# Patient Record
Sex: MALE | Race: WHITE | NOT HISPANIC OR LATINO | Employment: OTHER | URBAN - METROPOLITAN AREA
[De-identification: names, ages, dates, MRNs, and addresses within clinical notes are randomized per-mention and may not be internally consistent; named-entity substitution may affect disease eponyms.]

---

## 2017-02-28 ENCOUNTER — ANESTHESIA (OUTPATIENT)
Dept: GASTROENTEROLOGY | Facility: HOSPITAL | Age: 77
End: 2017-02-28

## 2017-02-28 ENCOUNTER — ANESTHESIA EVENT (OUTPATIENT)
Dept: GASTROENTEROLOGY | Facility: HOSPITAL | Age: 77
End: 2017-02-28

## 2017-02-28 PROCEDURE — 25010000002 PROPOFOL 10 MG/ML EMULSION: Performed by: ANESTHESIOLOGY

## 2017-02-28 RX ORDER — PROPOFOL 10 MG/ML
VIAL (ML) INTRAVENOUS AS NEEDED
Status: DISCONTINUED | OUTPATIENT
Start: 2017-02-28 | End: 2017-02-28 | Stop reason: SURG

## 2017-02-28 RX ORDER — LIDOCAINE HYDROCHLORIDE 20 MG/ML
INJECTION, SOLUTION INFILTRATION; PERINEURAL AS NEEDED
Status: DISCONTINUED | OUTPATIENT
Start: 2017-02-28 | End: 2017-02-28 | Stop reason: SURG

## 2017-02-28 RX ORDER — PROPOFOL 10 MG/ML
VIAL (ML) INTRAVENOUS CONTINUOUS PRN
Status: DISCONTINUED | OUTPATIENT
Start: 2017-02-28 | End: 2017-02-28 | Stop reason: SURG

## 2017-02-28 RX ADMIN — PROPOFOL 100 MG: 10 INJECTION, EMULSION INTRAVENOUS at 08:09

## 2017-02-28 RX ADMIN — LIDOCAINE HYDROCHLORIDE 60 MG: 20 INJECTION, SOLUTION INFILTRATION; PERINEURAL at 08:09

## 2017-02-28 RX ADMIN — PROPOFOL 100 MCG/KG/MIN: 10 INJECTION, EMULSION INTRAVENOUS at 08:09

## 2017-02-28 NOTE — ANESTHESIA POSTPROCEDURE EVALUATION
Patient: Keith Hankins Jr.    Procedure Summary     Date Anesthesia Start Anesthesia Stop Room / Location    02/28/17 0805 0828  [facility]       Procedure Diagnosis Surgeon Provider    COLONOSCOPY (N/A ) No diagnosis on file. MD Christie Gomez MD          Anesthesia Type: MAC  Last vitals  BP      Temp      Pulse     Resp      SpO2        Post Anesthesia Care and Evaluation    Patient location during evaluation: bedside  Patient participation: complete - patient participated  Level of consciousness: awake and alert  Pain management: adequate  Airway patency: patent  Anesthetic complications: No anesthetic complications    Cardiovascular status: acceptable  Respiratory status: acceptable  Hydration status: acceptable

## 2017-02-28 NOTE — ANESTHESIA PREPROCEDURE EVALUATION
Anesthesia Evaluation     Patient summary reviewed      Airway   Mallampati: II  no difficulty expected  Dental - normal exam     Pulmonary - negative pulmonary ROS and normal exam   Cardiovascular - normal exam    (+) hypertension, past MI ,       Neuro/Psych- negative ROS  GI/Hepatic/Renal/Endo - negative ROS     Musculoskeletal (-) negative ROS    Abdominal    Substance History - negative use     OB/GYN          Other - negative ROS                                   Anesthesia Plan    ASA 3     MAC     intravenous induction   Anesthetic plan and risks discussed with patient.

## 2017-07-18 ENCOUNTER — TELEPHONE (OUTPATIENT)
Dept: CARDIOLOGY | Facility: CLINIC | Age: 77
End: 2017-07-18

## 2017-07-18 NOTE — TELEPHONE ENCOUNTER
07/18/17  4:22 PM  Keith Hankins Jr.  1940    Home Phone [phone]     Mr. Hankins calls to report SOA with walking that has been occurring for the past 3 months. He works out regularly and states he is able to complete his work-out, but he does get more SOA than usual during the work-out. He also reports feeling more fatigued during the day than usual.     He denies chest pain. He cannot tell me the numbers, but states he BP and HR have been normal. He feels that he may need a stress test.     I don't see any open appts on your schedule until August 22nd. There is a hold spot that is open at the [facility] office next week, 7/27. When should he be seen?    Pati GOMEZ RN

## 2017-07-19 NOTE — TELEPHONE ENCOUNTER
Pt left msg returning your call & said he can come next Thursday to [facility] @ 3:40 pm.  I tried to put him in but it will not let me add someone in my own hold spots!  Can you please see if you can add him?    Thanks,  Loren

## 2017-07-27 ENCOUNTER — OFFICE VISIT (OUTPATIENT)
Dept: CARDIOLOGY | Facility: CLINIC | Age: 77
End: 2017-07-27

## 2017-07-27 VITALS
WEIGHT: 178 LBS | HEIGHT: 70 IN | BODY MASS INDEX: 25.48 KG/M2 | SYSTOLIC BLOOD PRESSURE: 120 MMHG | DIASTOLIC BLOOD PRESSURE: 58 MMHG | HEART RATE: 61 BPM

## 2017-07-27 DIAGNOSIS — I25.10 CORONARY ARTERY DISEASE INVOLVING NATIVE CORONARY ARTERY OF NATIVE HEART WITHOUT ANGINA PECTORIS: Primary | ICD-10-CM

## 2017-07-27 PROCEDURE — 99213 OFFICE O/P EST LOW 20 MIN: CPT | Performed by: INTERNAL MEDICINE

## 2017-07-27 PROCEDURE — 93000 ELECTROCARDIOGRAM COMPLETE: CPT | Performed by: INTERNAL MEDICINE

## 2017-07-30 NOTE — PROGRESS NOTES
Subjective:     Encounter Date:07/27/2017      Patient ID: Keith Hankins Jr. is a 77 y.o. male.    Chief Complaint:  Hypertension   This is a chronic problem. The problem is controlled. Associated symptoms include malaise/fatigue and shortness of breath. Pertinent negatives include no anxiety, chest pain, palpitations or peripheral edema.   Coronary Artery Disease   Presents for follow-up visit. Symptoms include shortness of breath. Pertinent negatives include no chest pain, chest pressure, chest tightness, dizziness or palpitations. His past medical history is significant for past myocardial infarction. The symptoms have been stable. Compliance with diet is good. Compliance with exercise is good. Compliance with medications is good.       77-year-old gentleman who I have not seen for a long time.  Patient presents today for reevaluation.  Patient had an episode where he felt a little short of breath.  He said he probably prematurely called to resolve the symptoms have subsequently resolved.  Clinically he is doing well no complaints    Review of Systems   Constitution: Positive for malaise/fatigue.   Cardiovascular: Negative for chest pain and palpitations.   Respiratory: Positive for shortness of breath. Negative for chest tightness.    Neurological: Negative for dizziness.   Psychiatric/Behavioral: Positive for depression.   All other systems reviewed and are negative.        ECG 12 Lead  Date/Time: 7/27/2017 11:13 AM  Performed by: VICTOR HUGO MELTON  Authorized by: VICTOR HUGO MELTON   Comparison: compared with previous ECG from 10/23/2015  Similar to previous ECG  Rhythm: sinus rhythm  Clinical impression: normal ECG               Objective:     Physical Exam   Constitutional: He is oriented to person, place, and time. He appears well-developed.   HENT:   Head: Normocephalic.   Eyes: Conjunctivae are normal.   Neck: Normal range of motion.   Cardiovascular: Normal rate, regular rhythm and normal heart  sounds.    Pulmonary/Chest: Breath sounds normal.   Abdominal: Soft. Bowel sounds are normal.   Musculoskeletal: Normal range of motion. He exhibits no edema.   Neurological: He is alert and oriented to person, place, and time.   Skin: Skin is warm and dry.   Psychiatric: He has a normal mood and affect. His behavior is normal.   Vitals reviewed.      Lab Review:       Assessment:         No diagnosis found.       Plan:       1.  History of old myocardial infarction.  Currently stable no issues.  2.  Hypertension and pressures great  3.  History of chronic kidney disease.  4.  At this point I recommend no further changes follow him clinically have him follow-up in one year sooner if issues    Coronary Artery Disease  Assessment  • The patient has no angina    Plan  • Lifestyle modifications discussed include adhering to a heart healthy diet, avoidance of tobacco products, maintenance of a healthy weight, medication compliance, regular exercise and regular monitoring of cholesterol and blood pressure    Subjective - Objective  • There is a history of past MI  • Current antiplatelet therapy includes aspirin 325 mg

## 2017-09-12 ENCOUNTER — OFFICE VISIT (OUTPATIENT)
Dept: CARDIOLOGY | Facility: CLINIC | Age: 77
End: 2017-09-12

## 2017-09-12 VITALS
OXYGEN SATURATION: 98 % | HEIGHT: 70 IN | HEART RATE: 57 BPM | SYSTOLIC BLOOD PRESSURE: 122 MMHG | DIASTOLIC BLOOD PRESSURE: 70 MMHG | BODY MASS INDEX: 26.2 KG/M2 | WEIGHT: 183 LBS

## 2017-09-12 DIAGNOSIS — I25.2 OLD MI (MYOCARDIAL INFARCTION): Primary | ICD-10-CM

## 2017-09-12 PROCEDURE — 99213 OFFICE O/P EST LOW 20 MIN: CPT | Performed by: INTERNAL MEDICINE

## 2017-09-12 NOTE — PROGRESS NOTES
Subjective:     Encounter Date:09/12/2017      Patient ID: Keith Hankins Jr. is a 77 y.o. male.    Chief Complaint:  Coronary Artery Disease   Presents for follow-up visit. Pertinent negatives include no chest pressure, chest tightness or dizziness. The symptoms have been stable. Compliance with diet is good. Compliance with exercise is good. Compliance with medications is good.   Hypertension   This is a chronic problem. The problem is controlled. Associated symptoms include malaise/fatigue. Pertinent negatives include no anxiety or peripheral edema.       77 old gentleman who presents today for reevaluation.  Since I had seen him last he was diagnosed with a deviated septum as well as sleep apnea.  He's been unable to tolerate his mask and therefore he is trying.  He is to undergo a nasal surgery in about a week.  Myocardial vascular standpoint he remained stable with no complaints no chest pain lightheadedness shortness of breath or palpitations.  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  He unfortunately is facing some life issues that has been very hard for him to address mentally.  With that he realizes he is depressed    Review of Systems   Constitution: Positive for malaise/fatigue.   Respiratory: Positive for sleep disturbances due to breathing and snoring. Negative for chest tightness.    Neurological: Negative for dizziness.   Psychiatric/Behavioral: Positive for depression. The patient is nervous/anxious.    All other systems reviewed and are negative.      Procedures       Objective:     Physical Exam   Constitutional: He is oriented to person, place, and time. He appears well-developed.   HENT:   Head: Normocephalic.   Eyes: Conjunctivae are normal.   Neck: Normal range of motion.   Cardiovascular: Normal rate, regular rhythm and normal heart sounds.   "  Pulmonary/Chest: Breath sounds normal.   Abdominal: Soft. Bowel sounds are normal.   Musculoskeletal: Normal range of motion. He exhibits no edema.   Neurological: He is alert and oriented to person, place, and time.   Skin: Skin is warm and dry.   Psychiatric: He has a normal mood and affect. His behavior is normal.   Vitals reviewed.      Lab Review:       Assessment:          Diagnosis Plan   1. Old MI (myocardial infarction)            Plan:       1.  History of prior marker infarction clinically stable  2.  Hypertension blood pressures great  3.  We had an extensive discussion about treatment with sleep apnea would help him significantly if we can get him to tolerate a mask.  I encouraged him to get his sinus surgery.  I also talked to them for a long time about life changes.  He is depressed and he is trying to work on it.  Unfortunately his career as winding down and as he says \"just not tolerating this well mentally\" he was not suicidal.  Patient was encouraged to best I could patient told to follow-up in one year sooner         "

## 2017-10-01 ENCOUNTER — HOSPITAL ENCOUNTER (EMERGENCY)
Facility: HOSPITAL | Age: 77
Discharge: HOME | End: 2017-10-02
Attending: EMERGENCY MEDICINE | Admitting: EMERGENCY MEDICINE

## 2017-10-01 ENCOUNTER — APPOINTMENT (OUTPATIENT)
Dept: GENERAL RADIOLOGY | Facility: HOSPITAL | Age: 77
End: 2017-10-01

## 2017-10-01 DIAGNOSIS — M25.572 ACUTE LEFT ANKLE PAIN: Primary | ICD-10-CM

## 2017-10-01 DIAGNOSIS — S82.52XA CLOSED AVULSION FRACTURE OF MEDIAL MALLEOLUS OF LEFT TIBIA, INITIAL ENCOUNTER: ICD-10-CM

## 2017-10-01 LAB
BASOPHILS # BLD AUTO: 0.02 10*3/MM3 (ref 0–0.2)
BASOPHILS NFR BLD AUTO: 0.2 % (ref 0–1.5)
DEPRECATED RDW RBC AUTO: 46.1 FL (ref 37–54)
EOSINOPHIL # BLD AUTO: 0.21 10*3/MM3 (ref 0–0.7)
EOSINOPHIL NFR BLD AUTO: 2.4 % (ref 0.3–6.2)
ERYTHROCYTE [DISTWIDTH] IN BLOOD BY AUTOMATED COUNT: 12.6 % (ref 11.5–14.5)
HCT VFR BLD AUTO: 45.6 % (ref 40.4–52.2)
HGB BLD-MCNC: 14.8 G/DL (ref 13.7–17.6)
IMM GRANULOCYTES # BLD: 0.02 10*3/MM3 (ref 0–0.03)
IMM GRANULOCYTES NFR BLD: 0.2 % (ref 0–0.5)
LYMPHOCYTES # BLD AUTO: 1.18 10*3/MM3 (ref 0.9–4.8)
LYMPHOCYTES NFR BLD AUTO: 13.3 % (ref 19.6–45.3)
MCH RBC QN AUTO: 32.5 PG (ref 27–32.7)
MCHC RBC AUTO-ENTMCNC: 32.5 G/DL (ref 32.6–36.4)
MCV RBC AUTO: 100.2 FL (ref 79.8–96.2)
MONOCYTES # BLD AUTO: 0.76 10*3/MM3 (ref 0.2–1.2)
MONOCYTES NFR BLD AUTO: 8.6 % (ref 5–12)
NEUTROPHILS # BLD AUTO: 6.68 10*3/MM3 (ref 1.9–8.1)
NEUTROPHILS NFR BLD AUTO: 75.3 % (ref 42.7–76)
PLATELET # BLD AUTO: 185 10*3/MM3 (ref 140–500)
PMV BLD AUTO: 10.7 FL (ref 6–12)
RBC # BLD AUTO: 4.55 10*6/MM3 (ref 4.6–6)
WBC NRBC COR # BLD: 8.87 10*3/MM3 (ref 4.5–10.7)

## 2017-10-01 PROCEDURE — 99283 EMERGENCY DEPT VISIT LOW MDM: CPT

## 2017-10-01 PROCEDURE — 73610 X-RAY EXAM OF ANKLE: CPT

## 2017-10-01 PROCEDURE — 80053 COMPREHEN METABOLIC PANEL: CPT | Performed by: PHYSICIAN ASSISTANT

## 2017-10-01 PROCEDURE — 84550 ASSAY OF BLOOD/URIC ACID: CPT | Performed by: PHYSICIAN ASSISTANT

## 2017-10-01 PROCEDURE — 85025 COMPLETE CBC W/AUTO DIFF WBC: CPT | Performed by: PHYSICIAN ASSISTANT

## 2017-10-02 ENCOUNTER — TELEPHONE (OUTPATIENT)
Dept: ORTHOPEDIC SURGERY | Facility: CLINIC | Age: 77
End: 2017-10-02

## 2017-10-02 VITALS
SYSTOLIC BLOOD PRESSURE: 160 MMHG | RESPIRATION RATE: 18 BRPM | BODY MASS INDEX: 26.66 KG/M2 | OXYGEN SATURATION: 100 % | HEART RATE: 67 BPM | TEMPERATURE: 97.8 F | DIASTOLIC BLOOD PRESSURE: 83 MMHG | WEIGHT: 180 LBS | HEIGHT: 69 IN

## 2017-10-02 LAB
ALBUMIN SERPL-MCNC: 4.2 G/DL (ref 3.5–5.2)
ALBUMIN/GLOB SERPL: 1.8 G/DL
ALP SERPL-CCNC: 67 U/L (ref 39–117)
ALT SERPL W P-5'-P-CCNC: 25 U/L (ref 1–41)
ANION GAP SERPL CALCULATED.3IONS-SCNC: 11.7 MMOL/L
AST SERPL-CCNC: 21 U/L (ref 1–40)
BILIRUB SERPL-MCNC: 0.4 MG/DL (ref 0.1–1.2)
BUN BLD-MCNC: 25 MG/DL (ref 8–23)
BUN/CREAT SERPL: 19.2 (ref 7–25)
CALCIUM SPEC-SCNC: 9.3 MG/DL (ref 8.6–10.5)
CHLORIDE SERPL-SCNC: 106 MMOL/L (ref 98–107)
CO2 SERPL-SCNC: 23.3 MMOL/L (ref 22–29)
CREAT BLD-MCNC: 1.3 MG/DL (ref 0.76–1.27)
GFR SERPL CREATININE-BSD FRML MDRD: 54 ML/MIN/1.73
GLOBULIN UR ELPH-MCNC: 2.3 GM/DL
GLUCOSE BLD-MCNC: 93 MG/DL (ref 65–99)
POTASSIUM BLD-SCNC: 4.8 MMOL/L (ref 3.5–5.2)
PROT SERPL-MCNC: 6.5 G/DL (ref 6–8.5)
SODIUM BLD-SCNC: 141 MMOL/L (ref 136–145)
URATE SERPL-MCNC: 7.7 MG/DL (ref 3.4–7)

## 2017-10-02 RX ORDER — ONDANSETRON 4 MG/1
4 TABLET, ORALLY DISINTEGRATING ORAL ONCE
Status: COMPLETED | OUTPATIENT
Start: 2017-10-02 | End: 2017-10-02

## 2017-10-02 RX ORDER — HYDROCODONE BITARTRATE AND ACETAMINOPHEN 5; 325 MG/1; MG/1
1 TABLET ORAL EVERY 6 HOURS PRN
Qty: 15 TABLET | Refills: 0 | Status: SHIPPED | OUTPATIENT
Start: 2017-10-02 | End: 2020-10-15

## 2017-10-02 RX ORDER — HYDROCODONE BITARTRATE AND ACETAMINOPHEN 7.5; 325 MG/1; MG/1
1 TABLET ORAL ONCE
Status: COMPLETED | OUTPATIENT
Start: 2017-10-02 | End: 2017-10-02

## 2017-10-02 RX ORDER — ONDANSETRON 4 MG/1
4 TABLET, ORALLY DISINTEGRATING ORAL EVERY 8 HOURS PRN
Qty: 15 TABLET | Refills: 0 | Status: SHIPPED | OUTPATIENT
Start: 2017-10-02 | End: 2020-06-05 | Stop reason: ALTCHOICE

## 2017-10-02 RX ADMIN — HYDROCODONE BITARTRATE AND ACETAMINOPHEN 1 TABLET: 7.5; 325 TABLET ORAL at 01:41

## 2017-10-02 RX ADMIN — ONDANSETRON 4 MG: 4 TABLET, ORALLY DISINTEGRATING ORAL at 01:41

## 2017-10-02 NOTE — ED PROVIDER NOTES
Pt presents to ED complaining of left lower leg/ankle swelling and pain onset last night. Pt experiences pain when ambulating. He cannot recall a specific injury, but he has been very active lately as he has been working on his boat. Upon exam, pt is alert and in NAD. There is tenderness just below medial left malleolus. There is moderate swelling. Dorsalis pedis pulse is intact. No erythema nor warmth.       XR left ankle: small avulsion fracture along the undersurface of the medial malleolus which may be chronic given lack of trauma history.     It is unclear if avulsion fracture is acute, but I suspect either that or an overuse tendonitis is responsible for pt's pain. We will review labs to rule out metabolic source.     I supervised care provided by the midlevel provider.    We have discussed this patient's history, physical exam, and treatment plan.   I have reviewed the note and personally saw and examined the patient and agree with the plan of care.    Documentation assistance provided by mary anne Braun for Dallin Leyva.  Information recorded by the scribe was done at my direction and has been verified and validated by me.       Jacinta Braun  10/01/17 1496       Dallin Leyva MD  10/02/17 8723

## 2017-10-02 NOTE — ED NOTES
"Pt to Room 6 with c/o left ankle pain that started around 0600 this morning.  Pt reports that he went to the gym on Friday and it was a \"little uncomfortable,\" Saturday he was working on his sailboat and it was \"a little better than Friday,\" and on Sunday he was awoken from his slumber in pain and unable to ambulate on it.  Pt reports that he is has recently stopped his blood thinner d/t having nose surgery next week.  PT does have 2+ pedal pulses, swelling noted to the medial aspect of the left ankle.  Pt is able to move his toes without discomfort.  No bruising noted.  Pt denies fever, chills, n/v/d, blurred vision, chest pain, abdominal pain, loss in control of bowel/bladder.  Pt is alert and oriented X4, PERRLA, respirations are even and unlabored, chest rise and fall is equal in expansion.  Pt does not appear to be in distress at this time.  Bed in low position, call light within reach, side rails up X2.      Daphney Jackman RN  10/01/17 2243    "

## 2017-10-04 ENCOUNTER — TELEPHONE (OUTPATIENT)
Dept: ORTHOPEDIC SURGERY | Facility: CLINIC | Age: 77
End: 2017-10-04

## 2017-10-09 ENCOUNTER — TELEPHONE (OUTPATIENT)
Dept: ORTHOPEDIC SURGERY | Facility: CLINIC | Age: 77
End: 2017-10-09

## 2017-10-09 NOTE — TELEPHONE ENCOUNTER
Has IOV with [facility] on 10/10 for ankle fracture from [facility] ER. Patient says he is doing so much better, does not feel he needs to come. The boot was rubbing, so he d/c'd and got a scooter. Has been totally NWB x 5 days. Also, on fixed income and does not want to come unless necessary. Please advise.

## 2017-10-10 ENCOUNTER — OFFICE VISIT (OUTPATIENT)
Dept: ORTHOPEDIC SURGERY | Facility: CLINIC | Age: 77
End: 2017-10-10

## 2017-10-10 VITALS — TEMPERATURE: 98.2 F | HEIGHT: 69 IN | WEIGHT: 180 LBS | BODY MASS INDEX: 26.66 KG/M2

## 2017-10-10 DIAGNOSIS — M25.572 LEFT ANKLE PAIN, UNSPECIFIED CHRONICITY: Primary | ICD-10-CM

## 2017-10-10 PROCEDURE — 99203 OFFICE O/P NEW LOW 30 MIN: CPT | Performed by: NURSE PRACTITIONER

## 2017-10-10 PROCEDURE — 73610 X-RAY EXAM OF ANKLE: CPT | Performed by: NURSE PRACTITIONER

## 2017-10-10 NOTE — PROGRESS NOTES
Patient: Keith Hankins Jr.  YOB: 1940 77 y.o. male  Medical Record Number: [id number]    Chief Complaints:   Chief Complaint   Patient presents with   • Left Ankle - Pain       History of Present Illness:Keith Hankins Jr. is a 77 y.o. male who presents For follow-up of questionable left ankle fracture.  Apparently the patient was doing a lot of work on his boat about 10 days ago and neck morning he woke up and had significant left ankle pain both lateral and medially.  He did not remember a specific injury.  He had something like this many years ago after wearing a pair of worn out tennis shoes.  Patient was placed in a boot in the emergency room and was referred here.  He came out of the boot last week has been walking around with no pain or problems.    Allergies: No Known Allergies    Medications:   Current Outpatient Prescriptions   Medication Sig Dispense Refill   • acetaminophen (TYLENOL) 650 MG 8 hr tablet Take 650 mg by mouth 2 (Two) Times a Day.     • amLODIPine (NORVASC) 10 MG tablet Take 10 mg by mouth Every Night.     • aspirin (ECOTRIN) 325 MG EC tablet Take 325 mg by mouth Daily.     • atorvastatin (LIPITOR) 40 MG tablet Take 40 mg by mouth Daily.     • buPROPion SR (WELLBUTRIN SR) 150 MG 12 hr tablet Take 1 tablet by mouth 2 (Two) Times a Day.     • furosemide (LASIX) 20 MG tablet Take 1 tablet by mouth Daily. As directed     • HYDROcodone-acetaminophen (NORCO) 5-325 MG per tablet Take 1 tablet by mouth Every 6 (Six) Hours As Needed for Severe Pain . 15 tablet 0   • irbesartan (AVAPRO) 150 MG tablet Take 300 mg by mouth Daily.     • metoprolol succinate XL (TOPROL-XL) 50 MG 24 hr tablet Take 1 tablet by mouth Daily.     • Multiple Vitamins-Minerals (CENTRUM SILVER ULTRA MENS PO) Take 1 tablet by mouth Daily.     • Nutritional Supplements (ANTI-INFLAMMATORY ENZYME PO) Take 4 tablets by mouth 2 (Two) Times a Day.     • OMEGA-3 KRILL OIL PO Take 1 capsule by mouth Daily.     • ondansetron  "ODT (ZOFRAN ODT) 4 MG disintegrating tablet Take 1 tablet by mouth Every 8 (Eight) Hours As Needed for Nausea (Take with pain medication if it makes you nauseated). 15 tablet 0   • tamsulosin (FLOMAX) 0.4 MG capsule 24 hr capsule Take 1 capsule by mouth Daily.     • Testosterone Cypionate (DEPO-TESTOSTERONE) 200 MG/ML injection Inject 200 mg into the shoulder, thigh, or buttocks 1 (One) Time Per Week.     • valsartan (DIOVAN) 320 MG tablet Take  by mouth.       No current facility-administered medications for this visit.          The following portions of the patient's history were reviewed and updated as appropriate: allergies, current medications, past family history, past medical history, past social history, past surgical history and problem list.    Review of Systems:   A 14 point review of systems was performed. All systems negative except pertinent positives/negative listed in HPI above    Physical Exam:   Vitals:    10/10/17 1202   Temp: 98.2 °F (36.8 °C)   Weight: 180 lb (81.6 kg)   Height: 69\" (175.3 cm)       General: A and O x 3, ASA, NAD    SCLERA:    Normal    DENTITION:   Normal  Skin clear no unusual lesions noted  Left ankle patient is nontender palpation he has full range of motion but does have a tight Achilles.  Negative drawer pedal pulses normal bilaterally    Radiology:  Xrays 2 views of the left ankle were ordered and reviewed today secondary to pain and show an old avulsion fracture of the lateral malleolus but otherwise no acute changes seen.  Dr. Morelos also reviewed x-rays and agrees.      Assessment/Plan:  Left ankle pain resolved    Patient was referred to outpatient physical therapy just for some generalized stretching and strengthening.  He will take Tylenol as needed since he is not able to take anti-inflammatories because of his kidneys and will let us know if the pain returns and we can always proceed with an MRI  "

## 2017-10-24 ENCOUNTER — HOSPITAL ENCOUNTER (OUTPATIENT)
Dept: PHYSICAL THERAPY | Facility: HOSPITAL | Age: 77
Setting detail: THERAPIES SERIES
Discharge: HOME | End: 2017-10-24

## 2017-10-24 DIAGNOSIS — M25.572 ACUTE LEFT ANKLE PAIN: Primary | ICD-10-CM

## 2017-10-24 PROCEDURE — G8978 MOBILITY CURRENT STATUS: HCPCS | Performed by: PHYSICAL THERAPIST

## 2017-10-24 PROCEDURE — 97161 PT EVAL LOW COMPLEX 20 MIN: CPT | Performed by: PHYSICAL THERAPIST

## 2017-10-24 PROCEDURE — G8979 MOBILITY GOAL STATUS: HCPCS | Performed by: PHYSICAL THERAPIST

## 2017-10-24 NOTE — THERAPY EVALUATION
Outpatient Physical Therapy Ortho Initial Evaluation  [facility]     Patient Name: Keith Hankins Jr.  : 1940  MRN: [id number]  Today's Date: 10/24/2017      Visit Date: 10/24/2017    There is no problem list on file for this patient.       Past Medical History:   Diagnosis Date   • Atherosclerotic heart disease of native coronary artery without angina pectoris    • Benign essential hypertension    • CKD (chronic kidney disease), stage III    • Degeneration of cervical intervertebral disc    • Depression with anxiety    • ED (erectile dysfunction)    • High cholesterol    • Hypertension    • MI (myocardial infarction)    • Spinal stenosis    • Syncope         Past Surgical History:   Procedure Laterality Date   • BACK SURGERY     • CATARACT EXTRACTION Bilateral    • COLONOSCOPY N/A 2017    Procedure: COLONOSCOPY TO CECUM;  Surgeon: Emmanuel Peterson MD;  Location: [facility] ENDOSCOPY;  Service:    • NECK SURGERY     • TONSILLECTOMY         Visit Dx:     ICD-10-CM ICD-9-CM   1. Acute left ankle pain M25.572 719.47             Patient History       10/24/17 1100          History    Chief Complaint Pain  -      Type of Pain Ankle pain  -KH      Brief Description of Current Complaint Keith is a 78 y/o male with history of L ankle pain after working on his boat. Patient woke up with significant ankle pain and swelling and went to the ER where they took X-Rays and put him in a boot. Patient's X-Rays showed an old avulsion fracture of the lateral malleolus but no acute changes. Now that he is a few weeks out, he no longer has any pain or swelling. He presents with full left ankle ROM and strength and no gait deficits. Patient likely pulled a muscle or irritated a tendon in his ankle which has now had time to heal. No need for skilled PT at this time but will leave POC open for 30 days in case patient has any exaccerbation of pain.   -      Onset Date- PT 10/24/17  -      Occupation/sports/leisure  activities walking dogs  -KH      Patient seeing anyone else for problem(s)? Yes  -KH      How has patient tried to help current problem? placed in boot temporarily  -KH      What clinical tests have you had for this problem? X-ray  -KH      Results of Clinical Tests old avulsion fracture of lateral malleolus  -KH      Pain     Pain Location Ankle  -KH      Pain at Present 1  -KH      Pain at Best 1  -KH      Pain at Worst 1  -KH      Pain Frequency Intermittent  -KH      Pain Description Aching  -KH      What Performance Factors Make the Current Problem(s) WORSE? WB/walking  -KH      What Performance Factors Make the Current Problem(s) BETTER? rest  -KH      Difficulties at work? not applicable  -KH      Difficulties with ADL's? no concerns  -KH      Difficulties with recreational activities? no concerns  -KH      Fall Risk Assessment    Any falls in the past year: No  -KH      Daily Activities    Primary Language English  -KH      Pt Participated in POC and Goals Yes  -KH      Safety    Are you being hurt, hit, or frightened by anyone at home or in your life? No  -KH      Are you being neglected by a caregiver No  -KH        User Key  (r) = Recorded By, (t) = Taken By, (c) = Cosigned By    Initials Name Provider Type    JACI Gallegos PT Physical Therapist                PT Ortho       10/24/17 1100    Subjective Comments    Subjective Comments My pain has pretty much resolved but I didn't think it would hurt to have my ankle looked at one more time  -KH    Subjective Pain    Able to rate subjective pain? yes  -KH    Pre-Treatment Pain Level 1  -KH    Post-Treatment Pain Level 1  -KH    Posture/Observations    Posture- WNL Posture is WNL  -KH    ROM (Range of Motion)    General ROM Detail B Ankle ROM WNL with inversion, eversion, plantar flexion, and dorsiflexion bilaterally  -KH    MMT (Manual Muscle Testing)    General MMT Assessment Detail B ankle strength 5/5 with MMT  -KH    Gait Assessment/Treatment     Gait, Comment no gait deficits noted  -JACI      User Key  (r) = Recorded By, (t) = Taken By, (c) = Cosigned By    Initials Name Provider Type    JACI Gallegos PT Physical Therapist                Therapy Education       10/24/17 1144          Therapy Education    Given Symptoms/condition management;Pain management  -      Program New  -JACI      How Provided Verbal  -KH      Provided to Patient  -JACI      Level of Understanding Teach back education performed  -JACI        User Key  (r) = Recorded By, (t) = Taken By, (c) = Cosigned By    Initials Name Provider Type    JACI Gallegos, PT Physical Therapist                PT OP Goals       10/24/17 1100       PT Short Term Goals    STG 1 Patient will continue to report minimal to no pain in Left ankle  -KH     STG 1 Progress New  -JACI       User Key  (r) = Recorded By, (t) = Taken By, (c) = Cosigned By    Initials Name Provider Type    JACI Gallegos PT Physical Therapist                PT Assessment/Plan       10/24/17 1143       PT Assessment    Assessment Comments Keith is a 78 y/o male with history of L ankle pain after working on his boat. Patient woke up with significant ankle pain and swelling and went to the ER where they took X-Rays and put him in a boot. Patient's X-Rays showed an old avulsion fracture of the lateral malleolus but no acute changes. Now that he is a few weeks out, he no longer has any pain or swelling. He presents with full left ankle ROM and strength and no gait deficits. Patient likely pulled a muscle or irritated a tendon in his ankle which has now had time to heal. No need for skilled PT at this time but will leave POC open for 30 days in case patient has any exaccerbation of pain.   -JACI     Please refer to paper survey for additional self-reported information Yes  -JACI     Rehab Potential Good  -KH     Patient/caregiver participated in establishment of treatment plan and goals Yes  -KH     PT Plan    PT Frequency One time visit  -JACI      Planned CPT's? PT EVAL LOW COMPLEXITY: 37759;PT THER PROC EA 15 MIN: 37415;PT MANUAL THERAPY EA 15 MIN: 01352;PT ULTRASOUND EA 15 MIN: 00098;PT RE-EVAL: 39567;PT SELF CARE/HOME MGMT/TRAIN EA 15: 19079;PT THER ACT EA 15 MIN: 80748;PT GAIT TRAINING EA 15 MIN: 40169  -     Physical Therapy Interventions (Optional Details) joint mobilization;patient/family education;postural re-education;ROM (Range of Motion);stair training;stretching;gross motor skills;home exercise program;transfer training;gait training;modalities  -     PT Plan Comments potential treatments if needed: US for pain relief, gait training, ankle strengthening/stretching  -       User Key  (r) = Recorded By, (t) = Taken By, (c) = Cosigned By    Initials Name Provider Type    JACI Gallegos PT Physical Therapist        Time Calculation:   Start Time: 1115  Stop Time: 1130  Time Calculation (min): 15 min     Therapy Charges for Today     Code Description Service Date Service Provider Modifiers Qty    64605573500 HC PT EVAL LOW COMPLEXITY 1 10/24/2017 Damaris Gallegos, PT GP 1    43978994338 HC PT MOBILITY CURRENT 10/24/2017 Damaris Gallegos, PT GP, CH 1    42325243021 HC PT MOBILITY PROJECTED 10/24/2017 Damaris Gallegos, PT GP, CH 1          PT G-Codes  PT Professional Judgement Used?: Yes  Outcome Measure Options: Other Outcome Measure (Strength/MMT)  Score: no disability- full ROM/strength  Functional Limitation: Mobility: Walking and moving around  Mobility: Walking and Moving Around Current Status (): 0 percent impaired, limited or restricted  Mobility: Walking and Moving Around Goal Status (): 0 percent impaired, limited or restricted         Damaris Gallegos PT  10/24/2017

## 2017-11-15 ENCOUNTER — OFFICE VISIT (OUTPATIENT)
Dept: ORTHOPEDIC SURGERY | Facility: CLINIC | Age: 77
End: 2017-11-15

## 2017-11-15 DIAGNOSIS — M25.511 RIGHT SHOULDER PAIN, UNSPECIFIED CHRONICITY: Primary | ICD-10-CM

## 2017-11-15 PROCEDURE — 99213 OFFICE O/P EST LOW 20 MIN: CPT | Performed by: ORTHOPAEDIC SURGERY

## 2017-11-15 PROCEDURE — 73030 X-RAY EXAM OF SHOULDER: CPT | Performed by: ORTHOPAEDIC SURGERY

## 2017-11-15 RX ORDER — TRAMADOL HYDROCHLORIDE 50 MG/1
TABLET ORAL
Qty: 30 TABLET | Refills: 0 | Status: SHIPPED | OUTPATIENT
Start: 2017-11-15 | End: 2020-06-05 | Stop reason: ALTCHOICE

## 2017-11-15 NOTE — PROGRESS NOTES
Chief Complaint   Patient presents with   • Right Shoulder - Pain             HPI the patient is here today for right shoulder painAnd limited abduction.  Duration of complaints is about 7 months.  Patient was working out aggressively at the [facility].  He felt a pop in the lateral aspect of his right shoulder.  Since that time he's had weakness in abduction.  He finds it difficult to sleep with his right shoulder in 8 recumbent position.  Fortunately, he is left-hand dominant and therefore his activities are not significantly affected by the shoulder injury.  He finds it difficult to to perform any cross body activities.  He's had a cervical spine fusion performed by Dr. Hayward at the C3 4 and the C4 5 level.  There is some overlap of pain and symptoms from the cervical spine radiating into the bilateral scapulae.  The shoulder pain however, is different and is constantly present.  Both the patient and I feel that this could be most likely a rotator cuff tear and we are going to order an MRI to evaluate the condition of the cuff and to make further recommendations.  The patient would like to pursue a surgical option if there is a tear of the cuff and a surgical repair is possible.          No Known Allergies      Social History     Social History   • Marital status:      Spouse name: N/A   • Number of children: N/A   • Years of education: N/A     Occupational History   • Not on file.     Social History Main Topics   • Smoking status: Former Smoker     Types: Cigarettes     Quit date: 1971   • Smokeless tobacco: Not on file      Comment: daily caffeine use   • Alcohol use Yes      Comment: occasional use   • Drug use: No   • Sexual activity: Defer     Other Topics Concern   • Not on file     Social History Narrative       Family History   Problem Relation Age of Onset   • Cancer Mother      breast   • Stroke Mother    • Heart disease Father        Past Surgical History:   Procedure  Laterality Date   • BACK SURGERY     • CATARACT EXTRACTION Bilateral    • COLONOSCOPY N/A 2/28/2017    Procedure: COLONOSCOPY TO CECUM;  Surgeon: Emmanuel Peterson MD;  [facility]: [facility] ENDOSCOPY;  Service:    • NECK SURGERY     • TONSILLECTOMY         Past Medical History:   Diagnosis Date   • Atherosclerotic heart disease of native coronary artery without angina pectoris    • Benign essential hypertension    • CKD (chronic kidney disease), stage III    • Degeneration of cervical intervertebral disc    • Depression with anxiety    • ED (erectile dysfunction)    • High cholesterol    • Hypertension    • MI (myocardial infarction)    • Spinal stenosis    • Syncope            There were no vitals filed for this visit.          Review of Systems   Constitutional: Negative.    HENT: Negative.    Eyes: Negative.    Respiratory: Negative.    Cardiovascular: Negative.    Gastrointestinal: Negative.    Endocrine: Negative.    Genitourinary: Negative.    Musculoskeletal: Positive for joint swelling.   Skin: Negative.    Allergic/Immunologic: Negative.    Neurological: Negative.    Hematological: Negative.    Psychiatric/Behavioral: Negative.            Physical Exam   Constitutional: He is oriented to person, place, and time. He appears well-nourished.   HENT:   Head: Atraumatic.   Eyes: EOM are normal.   Neck: Neck supple.   Cardiovascular: Normal rate and intact distal pulses.    Pulmonary/Chest: Effort normal.   Abdominal: Bowel sounds are normal.   Musculoskeletal: He exhibits edema and tenderness.   Neurological: He is alert and oriented to person, place, and time. He has normal reflexes.   Skin: Skin is dry.   Psychiatric: He has a normal mood and affect. His behavior is normal. Judgment and thought content normal.   Nursing note and vitals reviewed.              Joint/Body Part Specific Exam:  right shoulder. The shoulder is extremely tender anteriorly. There is tenderness over the insertion of the rotator cuff over  the greater tuberosity of the humerus. Slight proximal migration of the humeral head is noted. AC joint is tender. Crossover adduction test is positive. Drop arm sign is positive. Forward flexion is 0-90° degrees, abduction is 0-110° degrees, external rotation is 0-30 degrees. Patient has mild atrophy both of the supra and infraspinatus fossa. Sulcus sign is negative. There is no evidence of multidirectional instability. Neer test is positive on compression. Skin and soft tissue tenderness is noted. Patient’s strength in abduction and external rotation are extremely lacking. The pain level is 5.    Cervical Spine: Skin and soft tissues are mildly swollen. Deep tendon reflexes bilateral, symmetric and equal on both upper and lower extremities. Cough impulse is positive and invokes pain for the patient. No objective motor or sensory function deficit is present. No long tract signs. No myelopathy. No bowel or bladder deficit. Mild spasm of erector spinae muscle is present.Lateral rotations of the spine are limited in range of motion and painful for the patient. No evidence of myelopathy is noted.  bilaterally Sided rotation associated with pain and discomfort.      X-RAY Report:  right Shoulder X-Ray  Indication: Pain with limited range of motion following an injury  AP and Lateral  Findings: Sclerosis of the greater tuberosity of the shoulder  no bony lesion  Soft tissues within normal limits  decreased joint spaces  Hardware appropriately positioned not applicable      no prior studies available for comparison.    X-RAY was ordered and reviewed by Chet Raymundo MD              Diagnostics:            Keith was seen today for pain.    Diagnoses and all orders for this visit:    Right shoulder pain, unspecified chronicity  -     XR Shoulder 2+ View Right  -     MRI Shoulder Right Without Contrast; Future    Other orders  -     traMADol (ULTRAM) 50 MG tablet; 1 Oral QHS PRN severe pain            Procedures          I  provided this patient with educational materials regarding exercise and bone health.        Plan:   Stretching and strengthening exercises of the right shoulder to prevent a frozen shoulder.    Continue with [employer] back school exercise program for the cervical spine.    Tablet ibuprofen 600 mg orally twice a day for pain and discomfort.    Tablet Ultram 50 mg tab 1 by mouth daily at bedtime for breakthrough and nighttime pain and discomfort.    Schedule an MRI of the right shoulder for evaluation of rotator cuff pathology.  If the MRI shows a full-thickness rotator cuff tear he might be a candidate for surgical intervention.  He is the rotator cuff tear is partial thickness or there is involvement of the glenoid labrum in a degenerative sense than we could treat him with conservative, nonoperative management.    Follow-up in my office in 4 weeks to discuss the reports of the MRI and proceed with further recommendations for management.    Controlled substance treatment options discussed in detail. Patient's signed consent to medical options on file. KISHOR form in chart. Risks of narcotic medication usage outlined.  Possibility of physical and psychological dependence and abuse, especially long term, emphasized to the patient.  I have explained to the patient that we will try to wean them off the narcotic medication as soon as possible and introduce non-narcotic modalities of pain control.  I have also discussed the possibility of random drug testing as well as pill counts to prevent misuse and misappropriation of the narcotic medications prescribed to the patient.    CC To Clara Ann Pallares, MD

## 2017-11-29 ENCOUNTER — HOSPITAL ENCOUNTER (OUTPATIENT)
Dept: MRI IMAGING | Facility: HOSPITAL | Age: 77
Discharge: HOME | End: 2017-11-29
Attending: ORTHOPAEDIC SURGERY | Admitting: ORTHOPAEDIC SURGERY

## 2017-11-29 DIAGNOSIS — M25.511 RIGHT SHOULDER PAIN, UNSPECIFIED CHRONICITY: ICD-10-CM

## 2017-11-29 PROCEDURE — 73221 MRI JOINT UPR EXTREM W/O DYE: CPT

## 2017-12-06 ENCOUNTER — OFFICE VISIT (OUTPATIENT)
Dept: ORTHOPEDIC SURGERY | Facility: CLINIC | Age: 77
End: 2017-12-06

## 2017-12-06 DIAGNOSIS — M75.101 ROTATOR CUFF TEAR, NON-TRAUMATIC, RIGHT: Primary | ICD-10-CM

## 2017-12-06 PROCEDURE — 99214 OFFICE O/P EST MOD 30 MIN: CPT | Performed by: ORTHOPAEDIC SURGERY

## 2017-12-06 RX ORDER — PYRAZINAMIDE 500 MG/1
1 TABLET ORAL EVERY 12 HOURS PRN
Qty: 40 TABLET | Refills: 0 | Status: SHIPPED | OUTPATIENT
Start: 2017-12-06 | End: 2018-01-12 | Stop reason: SDUPTHER

## 2017-12-06 NOTE — PROGRESS NOTES
Chief Complaint   Patient presents with   • Right Shoulder - Follow-up           HPI the patient is here today for MRI results of his right shoulder. Patient continues to have pain and discomfort in the right shoulder.  He has some weakness in abduction.  He has some nighttime pain as well.  He likes to stay quite active and when he is working out in the gym his pain is definitely recurrent.  His clinical findings match his MRI findings.  He has a full-thickness tear of the supraspinatus which is appropriately symptomatic for him.  The patient states that he does not want a steroid injection at this point because the effects of that are mostly temporary and he is more interested in a long-term durable fix for his problem.  Had an extensive discussion about scheduling him for a rotator cuff repair along with a shoulder arthroscopy and he understands and accepts that.  All risks and benefits have been discussed in great detail.  Nighttime pain and discomfort on not controlled by ibuprofen and over-the-counter medication and therefore he is requesting something a little stronger to help manage his pain so that he can get some rest at night.        There were no vitals filed for this visit.        Review of Systems   Constitutional: Negative.    HENT: Negative.    Eyes: Negative.    Respiratory: Negative.    Cardiovascular: Negative.    Gastrointestinal: Negative.    Endocrine: Negative.    Genitourinary: Negative.    Musculoskeletal: Positive for myalgias.   Skin: Negative.    Allergic/Immunologic: Negative.    Neurological: Negative.    Hematological: Negative.    Psychiatric/Behavioral: Negative.            Physical Exam   Constitutional: He is oriented to person, place, and time. He appears well-developed and well-nourished.   HENT:   Head: Atraumatic.   Eyes: EOM are normal.   Neck: Neck supple.   Cardiovascular: Normal rate, regular rhythm, normal heart sounds and intact distal pulses.    Pulmonary/Chest: Effort  normal and breath sounds normal.   Abdominal: Soft. Bowel sounds are normal.   Musculoskeletal: He exhibits edema and tenderness.   Neurological: He is alert and oriented to person, place, and time. He has normal reflexes.   Skin: Skin is dry.   Psychiatric: He has a normal mood and affect. His behavior is normal. Judgment and thought content normal.   Nursing note and vitals reviewed.          Joint/Body Part Specific Exam:  right shoulder. The shoulder is extremely tender anteriorly. There is tenderness over the insertion of the rotator cuff over the greater tuberosity of the humerus. Slight proximal migration of the humeral head is noted. AC joint is tender. Crossover adduction test is positive. Drop arm sign is positive. Forward flexion is 0-120 degrees, abduction is 0-130 degrees, external rotation is 0-30 degrees. Patient has mild atrophy both of the supra and infraspinatus fossa. Sulcus sign is negative. There is no evidence of multidirectional instability. Neer test is positive on compression. Skin and soft tissue tenderness is noted. Patient’s strength in abduction and external rotation are extremely lacking. The pain level is 5.      X-RAY Report:        Diagnostics:  MRI report from the [facility] showed an extensive right supraspinatus tendinitis with a 10 mm full-thickness tear.  There is no retraction of the muscle belly.  No atrophy of the muscles noted.  There is fluid in the subacromial bursa.  There is mild arthritic change in the acromioclavicular joint.  The glenohumeral articulation is fairly well preserved.      Keith was seen today for follow-up.    Diagnoses and all orders for this visit:    Rotator cuff tear, non-traumatic, right    Other orders  -     acetaminophen-codeine (TYLENOL/CODEINE #3) 300-30 MG per tablet; Take 1 tablet by mouth Every 12 (Twelve) Hours As Needed for Moderate Pain .          Procedures        Plan:  Extensive discussion the patient about the findings of his MRI  on the right shoulder.  He does have a rotator cuff tear and his clinical findings match his MRI findings.    Retching and strengthening exercises of the shoulder to prevent a frozen shoulder.    Tablet ibuprofen 600 mg orally twice a day for pain and discomfort.    Use an overhead pulley system to minimize the possibility of developing a frozen shoulder.    The patient really does not want a steroid injection because he thinks that it will be a temporary fix for his issues and I agree that he is quite active at this point and would benefit from a rotator cuff repair surgically performed.    Dissected into the office today with the patient discussing the MRI findings and making surgical decision is 30 minutes.  Greater than 50% of this time was spent face-to-face with discussing the risks and benefits and potential complications of surgical intervention.    Given the patient a prescription of Tylenol with Codeine No. 3 tab 1 by mouth daily at bedtime when necessary pain and discomfort for nighttime breakthrough pain.    Patient has been diagnosed with a rotator cuff tear.  These tears can range from partial tears to complete tears of the muscle and/or tendon. They can also be categorized in size by width as being small, medium or large.  Diagnosis is confirmed with MRI or CT/arthrogram.  Management of these tears can be conservative with injections, physical therapy, activity modification and use of NSAIDS as needed.  Surgery is the only way to actually fix these tears, as they will not heal or repair on their own.  These tears can usually be repaired with arthroscopic surgery, but occasionally open procedures are necessary.  Many factors can influence the outcomes. First, larger tears have a higher chance of failure from a healing perspective, although pain may improve nonetheless, potentially longer recovery and sometimes, depending on the quality of the tissue and the length of time the tear has been present, may  not be repairable at all.  If the tear has been going on for a long time, the muscle can actually change to fatty tissue, and no longer act as muscle.  This can have a direct effect on not only the ability to repair the tear but also the outcome from the surgery itself both from a healing and functional perspective.  Therefore, the decision to proceed with surgery does have a time factor which can affect the quality of the tissue and reparability but also the potential for the tear to increase in size.  The longer you wait to repair the torn tendon there can be decreased potential for a successful outcome.    Risks of surgery have been discussed with the patient in detail.  These risks include but are not limited to possibility of infection, DVT, continued pain, continued progression of arthritis, use of allograft tissue, limited range of motion and strength and further surgical intervention.  Patient understands that the surgery will benefit mechanical symptoms of pain and instability but may not help with symptoms of arthritis.      Controlled substance treatment options discussed in detail. Patient's signed consent to medical options on file. KISHOR form in chart. Risks of narcotic medication usage outlined.  Possibility of physical and psychological dependence and abuse, especially long term, emphasized to the patient.  I have explained to the patient that we will try to wean them off the narcotic medication as soon as possible and introduce non-narcotic modalities of pain control.  I have also discussed the possibility of random drug testing as well as pill counts to prevent misuse and misappropriation of the narcotic medications prescribed to the patient.

## 2017-12-07 ENCOUNTER — DOCUMENTATION (OUTPATIENT)
Dept: PHYSICAL THERAPY | Facility: HOSPITAL | Age: 77
End: 2017-12-07

## 2017-12-07 DIAGNOSIS — M25.572 ACUTE LEFT ANKLE PAIN: Primary | ICD-10-CM

## 2017-12-07 NOTE — THERAPY DISCHARGE NOTE
Outpatient Physical Therapy Discharge Summary         Patient Name: Keith Hankins Jr.  : 1940  MRN: [id number]    Today's Date: 2017    Visit Dx:    ICD-10-CM ICD-9-CM   1. Acute left ankle pain M25.572 719.47             PT OP Goals       17 1100       PT Short Term Goals    STG 1 Patient will continue to report minimal to no pain in Left ankle  -KH     STG 1 Progress Met  -       User Key  (r) = Recorded By, (t) = Taken By, (c) = Cosigned By    Initials Name Provider Type    JACI Gallegos, PT Physical Therapist          OP PT Discharge Summary  Date of Discharge: 17  Reason for Discharge: All goals achieved  Outcomes Achieved: Able to achieve all goals within established timeline  Discharge Destination: Home without follow-up      Time Calculation:                    Damaris Gallegos PT  2017

## 2017-12-13 PROBLEM — M75.101 ROTATOR CUFF TEAR, NON-TRAUMATIC, RIGHT: Status: ACTIVE | Noted: 2017-12-13

## 2017-12-13 RX ORDER — CEFAZOLIN SODIUM 2 G/100ML
2 INJECTION, SOLUTION INTRAVENOUS ONCE
Status: CANCELLED | OUTPATIENT
Start: 2018-02-02 | End: 2018-02-02

## 2017-12-14 ENCOUNTER — TELEPHONE (OUTPATIENT)
Dept: ORTHOPEDIC SURGERY | Facility: CLINIC | Age: 77
End: 2017-12-14

## 2017-12-14 ENCOUNTER — PREP FOR SURGERY (OUTPATIENT)
Dept: OTHER | Facility: HOSPITAL | Age: 77
End: 2017-12-14

## 2017-12-14 DIAGNOSIS — M75.101 TEAR OF RIGHT ROTATOR CUFF, UNSPECIFIED TEAR EXTENT: Primary | ICD-10-CM

## 2017-12-14 RX ORDER — CEFAZOLIN SODIUM 2 G/100ML
2 INJECTION, SOLUTION INTRAVENOUS ONCE
Status: CANCELLED | OUTPATIENT
Start: 2018-02-13 | End: 2018-02-13

## 2017-12-14 RX ORDER — SODIUM CHLORIDE, SODIUM LACTATE, POTASSIUM CHLORIDE, CALCIUM CHLORIDE 600; 310; 30; 20 MG/100ML; MG/100ML; MG/100ML; MG/100ML
100 INJECTION, SOLUTION INTRAVENOUS CONTINUOUS
Status: CANCELLED | OUTPATIENT
Start: 2018-02-13

## 2017-12-14 RX ORDER — SODIUM CHLORIDE 0.9 % (FLUSH) 0.9 %
1-10 SYRINGE (ML) INJECTION AS NEEDED
Status: CANCELLED | OUTPATIENT
Start: 2018-02-13

## 2017-12-14 NOTE — TELEPHONE ENCOUNTER
Dr. Raymundo entered this as a [facility] case. I called the patient to discuss scheduling his surgery and he stated that he would much rather have this surgery at [facility] sometime in January. Thanks

## 2018-01-12 PROBLEM — M75.101 TEAR OF RIGHT ROTATOR CUFF: Status: ACTIVE | Noted: 2018-01-12

## 2018-01-15 RX ORDER — ACETAMINOPHEN AND CODEINE PHOSPHATE 300; 30 MG/1; MG/1
TABLET ORAL
Qty: 40 TABLET | Refills: 0 | Status: SHIPPED | OUTPATIENT
Start: 2018-01-15 | End: 2020-06-05 | Stop reason: ALTCHOICE

## 2018-01-31 ENCOUNTER — OFFICE VISIT (OUTPATIENT)
Dept: ORTHOPEDIC SURGERY | Facility: CLINIC | Age: 78
End: 2018-01-31

## 2018-01-31 DIAGNOSIS — M25.511 CHRONIC RIGHT SHOULDER PAIN: Primary | ICD-10-CM

## 2018-01-31 DIAGNOSIS — M75.101 ROTATOR CUFF TEAR, NON-TRAUMATIC, RIGHT: ICD-10-CM

## 2018-01-31 DIAGNOSIS — G89.29 CHRONIC RIGHT SHOULDER PAIN: Primary | ICD-10-CM

## 2018-01-31 PROCEDURE — 20610 DRAIN/INJ JOINT/BURSA W/O US: CPT | Performed by: ORTHOPAEDIC SURGERY

## 2018-01-31 PROCEDURE — 99213 OFFICE O/P EST LOW 20 MIN: CPT | Performed by: ORTHOPAEDIC SURGERY

## 2018-01-31 RX ADMIN — METHYLPREDNISOLONE ACETATE 160 MG: 80 INJECTION, SUSPENSION INTRA-ARTICULAR; INTRALESIONAL; INTRAMUSCULAR; SOFT TISSUE at 09:51

## 2018-01-31 RX ADMIN — LIDOCAINE HYDROCHLORIDE 2 ML: 10 INJECTION, SOLUTION EPIDURAL; INFILTRATION; INTRACAUDAL; PERINEURAL at 09:51

## 2018-02-05 ENCOUNTER — APPOINTMENT (OUTPATIENT)
Dept: PREADMISSION TESTING | Facility: HOSPITAL | Age: 78
End: 2018-02-05

## 2018-02-08 RX ORDER — METHYLPREDNISOLONE ACETATE 80 MG/ML
160 INJECTION, SUSPENSION INTRA-ARTICULAR; INTRALESIONAL; INTRAMUSCULAR; SOFT TISSUE
Status: COMPLETED | OUTPATIENT
Start: 2018-01-31 | End: 2018-01-31

## 2018-02-08 RX ORDER — LIDOCAINE HYDROCHLORIDE 10 MG/ML
2 INJECTION, SOLUTION EPIDURAL; INFILTRATION; INTRACAUDAL; PERINEURAL
Status: COMPLETED | OUTPATIENT
Start: 2018-01-31 | End: 2018-01-31

## 2018-09-12 ENCOUNTER — OFFICE VISIT (OUTPATIENT)
Dept: ORTHOPEDIC SURGERY | Facility: CLINIC | Age: 78
End: 2018-09-12

## 2018-09-12 VITALS — TEMPERATURE: 99 F | HEIGHT: 70 IN | WEIGHT: 180 LBS | BODY MASS INDEX: 25.77 KG/M2

## 2018-09-12 DIAGNOSIS — M25.511 RIGHT SHOULDER PAIN, UNSPECIFIED CHRONICITY: Primary | ICD-10-CM

## 2018-09-12 DIAGNOSIS — M75.101 ROTATOR CUFF TEAR, NON-TRAUMATIC, RIGHT: ICD-10-CM

## 2018-09-12 PROCEDURE — 20610 DRAIN/INJ JOINT/BURSA W/O US: CPT | Performed by: ORTHOPAEDIC SURGERY

## 2018-09-12 PROCEDURE — 99213 OFFICE O/P EST LOW 20 MIN: CPT | Performed by: ORTHOPAEDIC SURGERY

## 2018-09-12 RX ORDER — METHYLPREDNISOLONE ACETATE 80 MG/ML
160 INJECTION, SUSPENSION INTRA-ARTICULAR; INTRALESIONAL; INTRAMUSCULAR; SOFT TISSUE
Status: COMPLETED | OUTPATIENT
Start: 2018-09-12 | End: 2018-09-12

## 2018-09-12 RX ORDER — LIDOCAINE HYDROCHLORIDE 10 MG/ML
2 INJECTION, SOLUTION EPIDURAL; INFILTRATION; INTRACAUDAL; PERINEURAL
Status: COMPLETED | OUTPATIENT
Start: 2018-09-12 | End: 2018-09-12

## 2018-09-12 RX ADMIN — METHYLPREDNISOLONE ACETATE 160 MG: 80 INJECTION, SUSPENSION INTRA-ARTICULAR; INTRALESIONAL; INTRAMUSCULAR; SOFT TISSUE at 11:07

## 2018-09-12 RX ADMIN — LIDOCAINE HYDROCHLORIDE 2 ML: 10 INJECTION, SOLUTION EPIDURAL; INFILTRATION; INTRACAUDAL; PERINEURAL at 11:07

## 2018-09-12 NOTE — PROGRESS NOTES
Keith Hankins Jr.  ?  1940  ?  Chief Complaint   Patient presents with   • Right Shoulder - Follow-up, Pain     ?  HPI  Patient is here today for a follow-up on pain and discomfort in his right shoulder.  The patient states that his range of motion has improved to some degree.  He has tried a home based physical therapy program and that seems to have helped him tremendously in terms of decreasing his pain and discomfort.  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  He does have some nighttime pain and discomfort.  He states that he has to put the pressure off his body weight on his right shoulder to get some relief of symptoms.  He does not have a history of dislocation of the shoulder.  He is somewhat apprehensive to use his upper extremity and we have discussed with him about precautions to prevent a reinjury.  The patient does use CBD oil topically to act as an analgesic agent.  It helps him to decrease his pain as well as his nausea.        Review of Systems   Constitutional: Negative.    HENT: Negative.    Eyes: Negative.    Respiratory: Negative.    Cardiovascular: Negative.    Gastrointestinal: Negative.    Endocrine: Negative.    Genitourinary: Negative.    Musculoskeletal: Positive for arthralgias and myalgias.   Allergic/Immunologic: Negative.    Neurological: Negative.    Hematological: Negative.    Psychiatric/Behavioral: Negative.    All other systems reviewed and are negative.      Joint/Body Part Specific Exam:  right shoulder. The shoulder is extremely tender anteriorly. There is tenderness over the insertion of the rotator cuff over the greater tuberosity of the humerus. Slight proximal migration of the humeral head is noted. AC joint is tender. Crossover adduction test is positive. Drop arm sign is positive. Forward flexion is 0-130° degrees, abduction is 0-140° degrees, external rotation is 0-30° degrees. Patient  has mild atrophy both of the supra and infraspinatus fossa. Sulcus sign is negative. There is no evidence of multidirectional instability. Neer test is positive on compression. Skin and soft tissue tenderness is noted. Patient’s strength in abduction and external rotation are extremely lacking. The pain level is 3.  X Ray Report:    Diagnostics:    Large Joint Arthrocentesis  Date/Time: 9/12/2018 11:07 AM  Consent given by: patient  Site marked: site marked  Timeout: Immediately prior to procedure a time out was called to verify the correct patient, procedure, equipment, support staff and site/side marked as required   Supporting Documentation  Indications: pain   Procedure Details  Location: shoulder - R subacromial bursa  Preparation: Patient was prepped and draped in the usual sterile fashion  Needle size: 25 G  Approach: posterior  Medications administered: 160 mg methylPREDNISolone acetate 80 MG/ML; 2 mL lidocaine PF 1% 1 %  Patient tolerance: patient tolerated the procedure well with no immediate complications        ?  ?  Plan    · Ice pack for 48 hrs    · Injected patients right shoulder joint with steroid    · ace wrap for swelling PRN    · Elevations for swelling PRN    · Tablet Ibuprofen 600 mg P.O. BID x 5 Days with meals    · Call office for any problems  With procedure    · Home Physical Therapy Program discussed with patient    · F/U in 6 months for Re-evaluation.  ·   · Home-based exercise program with stretching and strengthening exercises of the rotator cuff muscles to minimize the possibility of arthrofibrosis and a frozen shoulder.  ·   · Discussed with the patient about the risks and benefits about surgical intervention both in the form of a rotator cuff repair as well as eventual reverse total shoulder arthroplasty.  The patient understands and will let me know if he would like to proceed with surgical intervention based on advancement of his symptoms.

## 2019-02-05 ENCOUNTER — OFFICE VISIT (OUTPATIENT)
Dept: ORTHOPEDIC SURGERY | Facility: CLINIC | Age: 79
End: 2019-02-05

## 2019-02-05 VITALS — BODY MASS INDEX: 26.66 KG/M2 | WEIGHT: 180 LBS | HEIGHT: 69 IN

## 2019-02-05 DIAGNOSIS — G89.29 CHRONIC RIGHT SHOULDER PAIN: Primary | ICD-10-CM

## 2019-02-05 DIAGNOSIS — M25.511 CHRONIC RIGHT SHOULDER PAIN: Primary | ICD-10-CM

## 2019-02-05 PROCEDURE — 20610 DRAIN/INJ JOINT/BURSA W/O US: CPT | Performed by: PHYSICIAN ASSISTANT

## 2019-02-05 PROCEDURE — 99213 OFFICE O/P EST LOW 20 MIN: CPT | Performed by: ORTHOPAEDIC SURGERY

## 2019-02-05 RX ADMIN — LIDOCAINE HYDROCHLORIDE 2 ML: 10 INJECTION, SOLUTION INFILTRATION; PERINEURAL at 14:20

## 2019-02-05 RX ADMIN — METHYLPREDNISOLONE ACETATE 160 MG: 80 INJECTION, SUSPENSION INTRA-ARTICULAR; INTRALESIONAL; INTRAMUSCULAR; SOFT TISSUE at 14:20

## 2019-02-05 NOTE — PROGRESS NOTES
Chief Complaint   Patient presents with   • Right Shoulder - Pain           HPI the patient is here today for a follow up of his right shoulder.         There were no vitals filed for this visit.        Review of Systems        Physical Exam        Joint/Body Part Specific Exam:        X-RAY Report:        Diagnostics:        There are no diagnoses linked to this encounter.        Large Joint Arthrocentesis: R subacromial bursa  Date/Time: 2/5/2019 2:51 PM  Consent given by: patient  Site marked: site marked  Timeout: Immediately prior to procedure a time out was called to verify the correct patient, procedure, equipment, support staff and site/side marked as required   Supporting Documentation  Indications: pain   Procedure Details  Location: shoulder - R subacromial bursa  Preparation: Patient was prepped and draped in the usual sterile fashion  Needle size: 25 G  Approach: anteromedial  Medications administered: 160 mg methylPREDNISolone acetate 80 MG/ML; 2 mL lidocaine PF 1% 1 %  Patient tolerance: patient tolerated the procedure well with no immediate complications              Plan:

## 2019-02-06 RX ORDER — LIDOCAINE HYDROCHLORIDE 10 MG/ML
2 INJECTION, SOLUTION INFILTRATION; PERINEURAL
Status: COMPLETED | OUTPATIENT
Start: 2019-02-05 | End: 2019-02-05

## 2019-02-06 RX ORDER — METHYLPREDNISOLONE ACETATE 80 MG/ML
160 INJECTION, SUSPENSION INTRA-ARTICULAR; INTRALESIONAL; INTRAMUSCULAR; SOFT TISSUE
Status: COMPLETED | OUTPATIENT
Start: 2019-02-05 | End: 2019-02-05

## 2019-02-06 NOTE — PROGRESS NOTES
FOLLOW UP VISIT    Keith Hankins Jr.:  ?  1940:  ?  Chief Complaint   Patient presents with   • Right Shoulder - Pain     ?  HPI:  He returns today with reports of pain in his right shoulder. He last received an injection of steroid about 5 months ago and it worked well. He states he is having difficulty and pain with putting a coat on, overhead motion/reaching and sleeping on the affected arm. He is very physically active and states he likes to do push ups but it is getting difficult because of his shoulder. He would like to discuss possible surgery for the right shoulder.     This patient is an established patient with an established problem.  This problem is not new to this examiner.    Review of Systems   Constitutional: Negative.    HENT: Negative.    Eyes: Negative.    Respiratory: Negative.    Cardiovascular: Negative.    Musculoskeletal: Positive for arthralgias.   Skin: Negative.    Allergic/Immunologic: Negative.    Neurological: Negative.    Hematological: Negative.    Psychiatric/Behavioral: Negative.          Physical Exam   Constitutional: Patient is oriented to person, place, and time. Appears well-developed and well-nourished.   HENT:   Head: Normocephalic and atraumatic.   Eyes: Conjunctivae and EOM are normal. Pupils are equal, round, and reactive to light.   Cardiovascular: Normal rate, regular rhythm, normal heart sounds and intact distal pulses.   Pulmonary/Chest: Effort normal and breath sounds normal.   Musculoskeletal:   See detailed exam below   Neurological: Alert and oriented to person, place, and time. No sensory deficit. Coordination normal.   Skin: Skin is warm and dry. Capillary refill takes less than 2 seconds. No rash noted. No erythema.   Psychiatric: Patient has a normal mood and affect. Her behavior is normal. Judgment and thought content normal.   Nursing note and vitals reviewed.    Ortho Exam:   right shoulder. The shoulder is extremely tender anteriorly. There is  tenderness over the insertion of the rotator cuff over the greater tuberosity of the humerus. Slight proximal migration of the humeral head is noted. AC joint is tender. Crossover adduction test is positive. Drop arm sign is positive. Forward flexion is 0-130 degrees, abduction is 0-140 degrees, external rotation is 0-30 degrees. Patient has mild atrophy both of the supra and infraspinatus fossa. Sulcus sign is negative. There is no evidence of multidirectional instability. Neer test is positive on compression. Skin and soft tissue tenderness is noted. Patient’s strength in abduction and external rotation are extremely lacking. The pain level is 5..      Diagnostics:       Assessment:  Keith was seen today for pain.    Diagnoses and all orders for this visit:    Chronic right shoulder pain  -     Cancel: Large Joint Arthrocentesis: R subacromial bursa  -     Large Joint Arthrocentesis: R glenohumeral          Large Joint Arthrocentesis: R glenohumeral  Date/Time: 2/5/2019 2:20 PM  Consent given by: patient  Site marked: site marked  Timeout: Immediately prior to procedure a time out was called to verify the correct patient, procedure, equipment, support staff and site/side marked as required   Supporting Documentation  Indications: pain   Procedure Details  Location: shoulder - R glenohumeral  Preparation: Patient was prepped and draped in the usual sterile fashion  Needle size: 25 G  Approach: posterior  Medications administered: 2 mL lidocaine 1 %; 160 mg methylPREDNISolone acetate 80 MG/ML  Patient tolerance: patient tolerated the procedure well with no immediate complications      Injection(s) above administered by Aden Mccollum PA-C.    ?  ?  Plan    · Compression/elastic brace if applicable  · Rest, ice, compression, and elevation (RICE) therapy  · OTC Alternate Ibuprofen and Tylenol as needed  · Restrictions: Advised not to do push ups   · Follow up in 3 month(s)  · Discussed with the patient about  the risks and benefits about surgical intervention both in the form of a rotator cuff repair as well as eventual reverse total shoulder arthroplasty. The patient understands and will let me know if he would like to proceed with surgical intervention based on advancement of his symptoms.  · Time spent: 25 minutes face to face. Greater than 50% spent in counseling and coordination in care

## 2019-05-22 ENCOUNTER — OFFICE VISIT (OUTPATIENT)
Dept: ORTHOPEDIC SURGERY | Facility: CLINIC | Age: 79
End: 2019-05-22

## 2019-05-22 VITALS — WEIGHT: 178 LBS | HEIGHT: 69 IN | BODY MASS INDEX: 26.36 KG/M2 | TEMPERATURE: 97.8 F

## 2019-05-22 DIAGNOSIS — M75.101 ROTATOR CUFF TEAR, NON-TRAUMATIC, RIGHT: ICD-10-CM

## 2019-05-22 DIAGNOSIS — M25.511 RIGHT SHOULDER PAIN, UNSPECIFIED CHRONICITY: Primary | ICD-10-CM

## 2019-05-22 PROCEDURE — 99213 OFFICE O/P EST LOW 20 MIN: CPT | Performed by: ORTHOPAEDIC SURGERY

## 2019-05-22 PROCEDURE — 20610 DRAIN/INJ JOINT/BURSA W/O US: CPT | Performed by: ORTHOPAEDIC SURGERY

## 2019-05-22 RX ORDER — ALLOPURINOL 100 MG/1
100 TABLET ORAL DAILY
Refills: 0 | COMMUNITY
Start: 2019-04-30

## 2019-05-22 RX ORDER — METHYLPREDNISOLONE ACETATE 80 MG/ML
160 INJECTION, SUSPENSION INTRA-ARTICULAR; INTRALESIONAL; INTRAMUSCULAR; SOFT TISSUE
Status: COMPLETED | OUTPATIENT
Start: 2019-05-22 | End: 2019-05-22

## 2019-05-22 RX ADMIN — METHYLPREDNISOLONE ACETATE 160 MG: 80 INJECTION, SUSPENSION INTRA-ARTICULAR; INTRALESIONAL; INTRAMUSCULAR; SOFT TISSUE at 10:15

## 2019-05-22 NOTE — PROGRESS NOTES
Keith Hankins Jr.  ?  1940  ?  Chief Complaint   Patient presents with   • Right Shoulder - Follow-up, Pain     ?  HPI  Patient is here for evaluation of pain and discomfort in his right shoulder.  He does have some weakness in abduction.  The patient states that the previous injection of steroid helped him for about 3 months.  He is very careful about limiting his abduction.  He is also careful about not lifting any heavy weights.  He states that he does have some nighttime pain and discomfort.  Overall he is pleased with his nonoperative management.  He has thought about surgery but would like to defer surgical intervention for as long as possible.  We have discussed about progressive cuff tear arthropathy and the fact that he may end up needing a reverse total shoulder arthroplasty based on the progression of his symptoms.  The patient states that he will let me know when he is ready for that form of intervention.        Review of Systems   Constitutional: Negative.    HENT: Negative.    Eyes: Negative.    Respiratory: Negative.    Cardiovascular: Negative.    Gastrointestinal: Negative.    Endocrine: Negative.    Genitourinary: Negative.    Musculoskeletal: Positive for arthralgias and myalgias.   Skin: Negative.    Allergic/Immunologic: Negative.    Neurological: Negative.    Hematological: Negative.    Psychiatric/Behavioral: Negative.        Joint/Body Part Specific Exam:right shoulder. The shoulder is extremely tender anteriorly. There is tenderness over the insertion of the rotator cuff over the greater tuberosity of the humerus. Slight proximal migration of the humeral head is noted. AC joint is tender. Crossover adduction test is positive. Drop arm sign is positive. Forward flexion is 0-120 degrees, abduction is 0-130 degrees, external rotation is 0-40 degrees. Patient has mild atrophy both of the supra and infraspinatus fossa. Sulcus sign is negative. There is no evidence of multidirectional  instability. Neer test is positive on compression. Skin and soft tissue tenderness is noted. Patient’s strength in abduction and external rotation are extremely lacking. The pain level is 5.    X Ray Report:    Diagnostics:    Large Joint Arthrocentesis: R glenohumeral  Date/Time: 5/22/2019 10:15 AM  Consent given by: patient  Site marked: site marked  Timeout: Immediately prior to procedure a time out was called to verify the correct patient, procedure, equipment, support staff and site/side marked as required   Supporting Documentation  Indications: pain   Procedure Details  Location: shoulder - R glenohumeral  Preparation: Patient was prepped and draped in the usual sterile fashion  Needle size: 25 G  Approach: posterior  Medications administered: 160 mg methylPREDNISolone acetate 80 MG/ML; 2 mL lidocaine (cardiac)  Patient tolerance: patient tolerated the procedure well with no immediate complications        ?  ?  Plan    · Ice pack for 48 hrs    · Injected patients right shoulder joint with steroid    · ace wrap for swelling PRN    · Elevations for swelling PRN    · Tablet Ibuprofen 600 mg P.O. BID x 5 Days with meals    · Call office for any problems  With procedure.  ·   · Continue with a home-based exercise program to prevent arthrofibrosis.  ·   · Discussed with the patient about possible surgical intervention in the form of reverse total shoulder arthroplasty.  The patient will let me know when he is ready for that form of intervention.    · Home Physical Therapy Program discussed with patient    · F/U in 3 months for Re-evaluation

## 2019-08-28 ENCOUNTER — CLINICAL SUPPORT (OUTPATIENT)
Dept: ORTHOPEDIC SURGERY | Facility: CLINIC | Age: 79
End: 2019-08-28

## 2019-08-28 VITALS — TEMPERATURE: 98.5 F | BODY MASS INDEX: 26.48 KG/M2 | WEIGHT: 185 LBS | HEIGHT: 70 IN

## 2019-08-28 DIAGNOSIS — M75.101 ROTATOR CUFF TEAR, NON-TRAUMATIC, RIGHT: Primary | ICD-10-CM

## 2019-08-28 PROCEDURE — 20610 DRAIN/INJ JOINT/BURSA W/O US: CPT | Performed by: ORTHOPAEDIC SURGERY

## 2019-08-28 RX ORDER — METHYLPREDNISOLONE ACETATE 80 MG/ML
160 INJECTION, SUSPENSION INTRA-ARTICULAR; INTRALESIONAL; INTRAMUSCULAR; SOFT TISSUE
Status: COMPLETED | OUTPATIENT
Start: 2019-08-28 | End: 2019-08-28

## 2019-08-28 RX ADMIN — METHYLPREDNISOLONE ACETATE 160 MG: 80 INJECTION, SUSPENSION INTRA-ARTICULAR; INTRALESIONAL; INTRAMUSCULAR; SOFT TISSUE at 14:16

## 2019-08-28 NOTE — PROGRESS NOTES
INJECTION    Patient: Keith Hankins Jr.    YOB: 1940    MRN: [id number]    Chief Complaint   Patient presents with   • Right Shoulder - Follow-up       History of Present Illness: Patient is seen in the office today with complaint of right shoulder pain. The pain is daily, generalized in the shoulder(s), and is acute on chronic in nature, and is worsened by overhead motion and cross body activities. It has been progressive in nature but remains intermittent . Has had improvement in the past with heat, Tylenol and injections.    This problem is not new to this examiner.     Allergies: No Known Allergies    Medications:   Home Medications:  Current Outpatient Medications on File Prior to Visit   Medication Sig   • acetaminophen (TYLENOL) 650 MG 8 hr tablet Take 650 mg by mouth 2 (Two) Times a Day.   • acetaminophen-codeine (TYLENOL #3) 300-30 MG per tablet TAKE 1 TAB BY MOUTH EVERY 12 HOURS AS NEEDED FOR MODERATE PAIN   • acetaminophen-codeine (TYLENOL #3) 300-30 MG per tablet 1-2 pills PO Q6 hours as needed for pain   • allopurinol (ZYLOPRIM) 100 MG tablet Take 100 mg by mouth Daily.   • amLODIPine (NORVASC) 10 MG tablet Take 10 mg by mouth Every Night.   • aspirin (ECOTRIN) 325 MG EC tablet Take 325 mg by mouth Daily.   • atorvastatin (LIPITOR) 40 MG tablet Take 40 mg by mouth Daily.   • buPROPion SR (WELLBUTRIN SR) 150 MG 12 hr tablet Take 1 tablet by mouth 2 (Two) Times a Day.   • furosemide (LASIX) 20 MG tablet Take 1 tablet by mouth Daily. As directed   • HYDROcodone-acetaminophen (NORCO) 5-325 MG per tablet Take 1 tablet by mouth Every 6 (Six) Hours As Needed for Severe Pain .   • irbesartan (AVAPRO) 150 MG tablet Take 300 mg by mouth Daily.   • metoprolol succinate XL (TOPROL-XL) 50 MG 24 hr tablet Take 1 tablet by mouth Daily.   • Multiple Vitamins-Minerals (CENTRUM SILVER ULTRA MENS PO) Take 1 tablet by mouth Daily.   • Nutritional Supplements (ANTI-INFLAMMATORY ENZYME PO) Take 4 tablets by  mouth 2 (Two) Times a Day.   • OMEGA-3 KRILL OIL PO Take 1 capsule by mouth Daily.   • ondansetron ODT (ZOFRAN ODT) 4 MG disintegrating tablet Take 1 tablet by mouth Every 8 (Eight) Hours As Needed for Nausea (Take with pain medication if it makes you nauseated).   • predniSONE (DELTASONE) 20 MG tablet 3 PO qd x 3 days, then 2 PO qd x 3 days, then 1 PO qd x 3 days.   • tamsulosin (FLOMAX) 0.4 MG capsule 24 hr capsule Take 1 capsule by mouth Daily.   • Testosterone Cypionate (DEPO-TESTOSTERONE) 200 MG/ML injection Inject 200 mg into the shoulder, thigh, or buttocks 1 (One) Time Per Week.   • traMADol (ULTRAM) 50 MG tablet 1 Oral QHS PRN severe pain   • valsartan (DIOVAN) 320 MG tablet Take  by mouth.     No current facility-administered medications on file prior to visit.      Current Medications:  Scheduled Meds:  PRN Meds:.    I have reviewed the patient's medical history in detail and updated the computerized patient record.  Review and summarization of old records include:    Past Medical History:   Diagnosis Date   • Atherosclerotic heart disease of native coronary artery without angina pectoris    • Benign essential hypertension    • CKD (chronic kidney disease), stage III (CMS/HCC)    • Degeneration of cervical intervertebral disc    • Depression with anxiety    • ED (erectile dysfunction)    • High cholesterol    • Hypertension    • MI (myocardial infarction) (CMS/[facility])    • Spinal stenosis    • Syncope      Past Surgical History:   Procedure Laterality Date   • BACK SURGERY     • CATARACT EXTRACTION Bilateral    • COLONOSCOPY N/A 2017    Procedure: COLONOSCOPY TO CECUM;  Surgeon: Emmanuel Peterson MD;  Location: [facility] ENDOSCOPY;  Service:    • NECK SURGERY     • TONSILLECTOMY       Social History     Occupational History   • Not on file   Tobacco Use   • Smoking status: Former Smoker     Types: Cigarettes     Last attempt to quit: 1971     Years since quittin.6   • Smokeless tobacco: Never Used   •  "Tobacco comment: daily caffeine use   Substance and Sexual Activity   • Alcohol use: Yes     Comment: occasional use   • Drug use: No   • Sexual activity: Defer      Social History     Social History Narrative   • Not on file     Family History   Problem Relation Age of Onset   • Cancer Mother         breast   • Stroke Mother    • Heart disease Father        Review of Systems        Wt Readings from Last 3 Encounters:   08/28/19 83.9 kg (185 lb)   05/22/19 80.7 kg (178 lb)   04/16/19 81.6 kg (180 lb)     Ht Readings from Last 3 Encounters:   08/28/19 177.8 cm (70\")   05/22/19 175.3 cm (69\")   04/16/19 177.8 cm (70\")     Body mass index is 26.54 kg/m².  Facility age limit for growth percentiles is 20 years.  Vitals:    08/28/19 1414   Temp: 98.5 °F (36.9 °C)       Physical Exam    Musculoskeletal:    Right shoulder (rct). The shoulder is extremely tender anteriorly. There is tenderness over the insertion of the rotator cuff over the greater tuberosity of the humerus. Slight proximal migration of the humeral head is noted. AC joint is tender. Crossover adduction test is positive. Drop arm sign is positive. Forward flexion is 0 to 140 degrees, abduction is 0-140 degrees, external rotation is 0-30 degrees. Patient has mild atrophy both of the supra and infraspinatus fossa. Sulcus sign is negative. There is no evidence of multidirectional instability. Neer test is positive on compression. Skin and soft tissue tenderness is noted. Patient's strength in abduction and external rotation are extremely lacking. The pain level is 5.      Diagnostics:      Procedure:  Large Joint Arthrocentesis: R glenohumeral  Date/Time: 8/28/2019 2:16 PM  Consent given by: patient  Site marked: site marked  Timeout: Immediately prior to procedure a time out was called to verify the correct patient, procedure, equipment, support staff and site/side marked as required   Supporting Documentation  Indications: pain   Procedure Details  Location: " shoulder - R glenohumeral  Preparation: Patient was prepped and draped in the usual sterile fashion  Needle size: 25 G  Approach: anteromedial  Medications administered: 2 mL lidocaine (cardiac); 160 mg methylPREDNISolone acetate 80 MG/ML  Patient tolerance: patient tolerated the procedure well with no immediate complications          Assessment:   Keith was seen today for follow-up.    Diagnoses and all orders for this visit:    Rotator cuff tear, non-traumatic, right    Other orders  -     Large Joint Arthrocentesis: R glenohumeral          Plan:   · Injected patient's right shoulder joint(s)with Steroid from an anterolateral approach   · Stretching and strengthening exercises of the right shoulder to prevent arthrofibrosis and a frozen shoulder.  · As his cuff tear disease progresses, he might eventually need reverse total shoulder arthroplasty.  I have discussed that with the patient.  At this point however, he is doing well with nonoperative management and plans are to continue with current care of his shoulder joint nonoperatively.  · Rest, ice, compression, and elevation (RICE) therapy  · OTC Tylenol 500-1000mg by mouth every 6 hours as needed for pain   · Follow up in 3 month(s)    Date of encounter: 08/28/2019   Chet Raymundo MD

## 2019-11-27 ENCOUNTER — CLINICAL SUPPORT (OUTPATIENT)
Dept: ORTHOPEDIC SURGERY | Facility: CLINIC | Age: 79
End: 2019-11-27

## 2019-11-27 VITALS — BODY MASS INDEX: 25.77 KG/M2 | WEIGHT: 180 LBS | TEMPERATURE: 99.1 F | HEIGHT: 70 IN

## 2019-11-27 DIAGNOSIS — M25.511 RIGHT SHOULDER PAIN, UNSPECIFIED CHRONICITY: Primary | ICD-10-CM

## 2019-11-27 DIAGNOSIS — M75.121 NONTRAUMATIC COMPLETE TEAR OF RIGHT ROTATOR CUFF: ICD-10-CM

## 2019-11-27 PROCEDURE — 73030 X-RAY EXAM OF SHOULDER: CPT | Performed by: ORTHOPAEDIC SURGERY

## 2019-11-27 PROCEDURE — 20610 DRAIN/INJ JOINT/BURSA W/O US: CPT | Performed by: ORTHOPAEDIC SURGERY

## 2019-11-27 RX ORDER — ESCITALOPRAM OXALATE 10 MG/1
10 TABLET ORAL DAILY
Refills: 1 | COMMUNITY
Start: 2019-10-27

## 2019-11-27 RX ORDER — BUPROPION HYDROCHLORIDE 150 MG/1
150 TABLET ORAL EVERY MORNING
Refills: 3 | COMMUNITY
Start: 2019-10-15

## 2019-11-27 RX ADMIN — METHYLPREDNISOLONE ACETATE 80 MG: 80 INJECTION, SUSPENSION INTRA-ARTICULAR; INTRALESIONAL; INTRAMUSCULAR; SOFT TISSUE at 10:02

## 2019-11-27 NOTE — PROGRESS NOTES
INJECTION    Patient: Keith Hankins Jr.    YOB: 1940    MRN: [id number]    Chief Complaint   Patient presents with   • Right Shoulder - Injections, Pain       History of Present Illness: Patient is seen in the office today with complaint of right shoulder pain. The pain is daily, generalized in the shoulder(s), and is acute on chronic in nature, and is worsened by overhead motion and cross body activities. It has been progressive in nature but remains constant. Has had improvement in the past with heat, NSAIDS and injections.  He states that he has had a prior cervical spine fusion involving the C4 to the C6 vertebral bodies and he does get significant radiculopathy from that lesion in the cervical spine.  It is quite possible that he has extended the degenerative changes in the cervical spine to the next level above and below the fusion levels which needs to be looked at by his original neurosurgeon Dr. Hayward.  The patient states that he will make an appointment to see his neurosurgeon for evaluation of the cervical spine symptoms.    This problem is not new to this examiner.     Allergies: No Known Allergies    Medications:   Home Medications:  Current Outpatient Medications on File Prior to Visit   Medication Sig   • acetaminophen (TYLENOL) 650 MG 8 hr tablet Take 650 mg by mouth 2 (Two) Times a Day.   • acetaminophen-codeine (TYLENOL #3) 300-30 MG per tablet TAKE 1 TAB BY MOUTH EVERY 12 HOURS AS NEEDED FOR MODERATE PAIN   • acetaminophen-codeine (TYLENOL #3) 300-30 MG per tablet 1-2 pills PO Q6 hours as needed for pain   • allopurinol (ZYLOPRIM) 100 MG tablet Take 100 mg by mouth Daily.   • amLODIPine (NORVASC) 10 MG tablet Take 10 mg by mouth Every Night.   • aspirin (ECOTRIN) 325 MG EC tablet Take 325 mg by mouth Daily.   • atorvastatin (LIPITOR) 40 MG tablet Take 40 mg by mouth Daily.   • buPROPion SR (WELLBUTRIN SR) 150 MG 12 hr tablet Take 1 tablet by mouth 2 (Two) Times a Day.   • buPROPion  XL (WELLBUTRIN XL) 150 MG 24 hr tablet Take 150 mg by mouth Every Morning.   • escitalopram (LEXAPRO) 10 MG tablet Take 10 mg by mouth Daily.   • furosemide (LASIX) 20 MG tablet Take 1 tablet by mouth Daily. As directed   • HYDROcodone-acetaminophen (NORCO) 5-325 MG per tablet Take 1 tablet by mouth Every 6 (Six) Hours As Needed for Severe Pain .   • irbesartan (AVAPRO) 150 MG tablet Take 300 mg by mouth Daily.   • metoprolol succinate XL (TOPROL-XL) 50 MG 24 hr tablet Take 1 tablet by mouth Daily.   • Multiple Vitamins-Minerals (CENTRUM SILVER ULTRA MENS PO) Take 1 tablet by mouth Daily.   • Nutritional Supplements (ANTI-INFLAMMATORY ENZYME PO) Take 4 tablets by mouth 2 (Two) Times a Day.   • OMEGA-3 KRILL OIL PO Take 1 capsule by mouth Daily.   • ondansetron ODT (ZOFRAN ODT) 4 MG disintegrating tablet Take 1 tablet by mouth Every 8 (Eight) Hours As Needed for Nausea (Take with pain medication if it makes you nauseated).   • predniSONE (DELTASONE) 20 MG tablet 3 PO qd x 3 days, then 2 PO qd x 3 days, then 1 PO qd x 3 days.   • tamsulosin (FLOMAX) 0.4 MG capsule 24 hr capsule Take 1 capsule by mouth Daily.   • Testosterone Cypionate (DEPO-TESTOSTERONE) 200 MG/ML injection Inject 200 mg into the shoulder, thigh, or buttocks 1 (One) Time Per Week.   • traMADol (ULTRAM) 50 MG tablet 1 Oral QHS PRN severe pain   • valsartan (DIOVAN) 320 MG tablet Take  by mouth.     No current facility-administered medications on file prior to visit.      Current Medications:  Scheduled Meds:  PRN Meds:.    I have reviewed the patient's medical history in detail and updated the computerized patient record.  Review and summarization of old records include:    Past Medical History:   Diagnosis Date   • Atherosclerotic heart disease of native coronary artery without angina pectoris    • Benign essential hypertension    • CKD (chronic kidney disease), stage III (CMS/HCC)    • Degeneration of cervical intervertebral disc    • Depression with  "anxiety    • ED (erectile dysfunction)    • High cholesterol    • Hypertension    • MI (myocardial infarction) (CMS/HCC)    • Spinal stenosis    • Syncope      Past Surgical History:   Procedure Laterality Date   • BACK SURGERY     • CATARACT EXTRACTION Bilateral    • COLONOSCOPY N/A 2017    Procedure: COLONOSCOPY TO CECUM;  Surgeon: Emmanuel Peterson MD;  Location: [facility] ENDOSCOPY;  Service:    • NECK SURGERY     • TONSILLECTOMY       Social History     Occupational History   • Not on file   Tobacco Use   • Smoking status: Former Smoker     Types: Cigarettes     Last attempt to quit: 1971     Years since quittin.9   • Smokeless tobacco: Never Used   • Tobacco comment: daily caffeine use   Substance and Sexual Activity   • Alcohol use: Yes     Comment: occasional use   • Drug use: No   • Sexual activity: Defer      Social History     Social History Narrative   • Not on file     Family History   Problem Relation Age of Onset   • Cancer Mother         breast   • Stroke Mother    • Heart disease Father        Review of Systems        Wt Readings from Last 3 Encounters:   19 81.6 kg (180 lb)   19 83.9 kg (185 lb)   19 80.7 kg (178 lb)     Ht Readings from Last 3 Encounters:   19 177.8 cm (70\")   19 177.8 cm (70\")   19 175.3 cm (69\")     Body mass index is 25.83 kg/m².  Facility age limit for growth percentiles is 20 years.  Vitals:    19 1443   Temp: 99.1 °F (37.3 °C)       Physical Exam    Musculoskeletal:    Right shoulder (cta). Rotator cuff function is extremely impaired. There is a high riding humeral head with articulation of the humerus to the undersurface of the acromiohumeral articulation. AC joint is exquisitely tender and painful for patient. Axillary nerve function is well preserved. There is significant winging of the scapula with hollowing of the fossae over the proximal and distal aspects of the spine of the scapula. Forward flexion is 0-30 degrees, " active abduction is 0-60 degrees. Drop arm sign is positive. External rotation against resistance is extremely painful and limited for the patient. Skin and soft tissues are essentially normal other than some amounts of swelling. The pain level is 5      Diagnostics:right Shoulder X-Ray  Indication: Limited & range of motion with weakness in abduction of the shoulder.  AP and Lateral  Findings: High riding humeral head with sclerosis over the greater tuberosity of the humerus.  no bony lesion  Soft tissues within normal limits  decreased joint spaces  Hardware appropriately positioned not applicable      yes prior studies available for comparison.    X-RAY was ordered and reviewed by Chet Raymundo MD          Procedure:  Large Joint Arthrocentesis: R glenohumeral  Date/Time: 11/27/2019 10:02 AM  Consent given by: patient  Site marked: site marked  Timeout: Immediately prior to procedure a time out was called to verify the correct patient, procedure, equipment, support staff and site/side marked as required   Supporting Documentation  Indications: pain and joint swelling   Procedure Details  Location: shoulder - R glenohumeral  Preparation: Patient was prepped and draped in the usual sterile fashion  Needle size: 22 G  Approach: anterolateral  Medications administered: 2 mL lidocaine (cardiac); 80 mg methylPREDNISolone acetate 80 MG/ML  Patient tolerance: patient tolerated the procedure well with no immediate complications            Assessment:   Keith was seen today for injections and pain.    Diagnoses and all orders for this visit:    Right shoulder pain, unspecified chronicity  -     XR Shoulder 2+ View Right    Nontraumatic complete tear of right rotator cuff    Other orders  -     Large Joint Arthrocentesis: R glenohumeral          Plan:   · Injected patient's right shoulder joint(s)with steroid from an anterolateral approach   · Continue with an aggressive exercise program with stretching exercises to  minimize the possibility of arthrofibrosis following the cuff tear arthropathy.  · There is no doubt in my mind that he will eventually need a reverse total shoulder arthroplasty based on the progression of his symptoms.  He states that he will let me know when he is ready for that form of surgical intervention.  · Rest, ice, compression, and elevation (RICE) therapy  · OTC Tylenol 500-1000mg by mouth every 6 hours as needed for pain   · Follow up in 3 month(s)    Date of encounter: 11/27/2019   Chet Raymundo MD

## 2019-11-28 RX ORDER — METHYLPREDNISOLONE ACETATE 80 MG/ML
80 INJECTION, SUSPENSION INTRA-ARTICULAR; INTRALESIONAL; INTRAMUSCULAR; SOFT TISSUE
Status: COMPLETED | OUTPATIENT
Start: 2019-11-27 | End: 2019-11-27

## 2020-02-26 ENCOUNTER — CLINICAL SUPPORT (OUTPATIENT)
Dept: ORTHOPEDIC SURGERY | Facility: CLINIC | Age: 80
End: 2020-02-26

## 2020-02-26 VITALS — WEIGHT: 184.6 LBS | TEMPERATURE: 97.6 F | BODY MASS INDEX: 26.43 KG/M2 | HEIGHT: 70 IN

## 2020-02-26 DIAGNOSIS — M75.101 ROTATOR CUFF TEAR, NON-TRAUMATIC, RIGHT: Primary | ICD-10-CM

## 2020-02-26 PROCEDURE — 20610 DRAIN/INJ JOINT/BURSA W/O US: CPT | Performed by: ORTHOPAEDIC SURGERY

## 2020-02-26 RX ORDER — METHYLPREDNISOLONE ACETATE 80 MG/ML
160 INJECTION, SUSPENSION INTRA-ARTICULAR; INTRALESIONAL; INTRAMUSCULAR; SOFT TISSUE
Status: COMPLETED | OUTPATIENT
Start: 2020-02-26 | End: 2020-02-26

## 2020-02-26 RX ADMIN — METHYLPREDNISOLONE ACETATE 160 MG: 80 INJECTION, SUSPENSION INTRA-ARTICULAR; INTRALESIONAL; INTRAMUSCULAR; SOFT TISSUE at 10:44

## 2020-02-26 NOTE — PROGRESS NOTES
INJECTION    Patient: Keith Hankins Jr.    YOB: 1940    MRN: [id number]    CC: Right shoulder rotator cuff tear with chronic pain.    History of Present Illness:  Patient returns today for follow-up on right shoulder pain. His pain is generalized in the shoulder, has been progressive and remains intermittent . His pain is worsened by overhead reaching, reaching backward and improved with ice and injections.    This problem is not new to this examiner.     Allergies: No Known Allergies    Medications:   Home Medications:  Current Outpatient Medications on File Prior to Visit   Medication Sig   • acetaminophen (TYLENOL) 650 MG 8 hr tablet Take 650 mg by mouth 2 (Two) Times a Day.   • acetaminophen-codeine (TYLENOL #3) 300-30 MG per tablet TAKE 1 TAB BY MOUTH EVERY 12 HOURS AS NEEDED FOR MODERATE PAIN   • acetaminophen-codeine (TYLENOL #3) 300-30 MG per tablet 1-2 pills PO Q6 hours as needed for pain   • allopurinol (ZYLOPRIM) 100 MG tablet Take 100 mg by mouth Daily.   • amLODIPine (NORVASC) 10 MG tablet Take 10 mg by mouth Every Night.   • aspirin (ECOTRIN) 325 MG EC tablet Take 325 mg by mouth Daily.   • atorvastatin (LIPITOR) 40 MG tablet Take 40 mg by mouth Daily.   • buPROPion SR (WELLBUTRIN SR) 150 MG 12 hr tablet Take 1 tablet by mouth 2 (Two) Times a Day.   • buPROPion XL (WELLBUTRIN XL) 150 MG 24 hr tablet Take 150 mg by mouth Every Morning.   • escitalopram (LEXAPRO) 10 MG tablet Take 10 mg by mouth Daily.   • furosemide (LASIX) 20 MG tablet Take 1 tablet by mouth Daily. As directed   • HYDROcodone-acetaminophen (NORCO) 5-325 MG per tablet Take 1 tablet by mouth Every 6 (Six) Hours As Needed for Severe Pain .   • irbesartan (AVAPRO) 150 MG tablet Take 300 mg by mouth Daily.   • metoprolol succinate XL (TOPROL-XL) 50 MG 24 hr tablet Take 1 tablet by mouth Daily.   • Multiple Vitamins-Minerals (CENTRUM SILVER ULTRA MENS PO) Take 1 tablet by mouth Daily.   • Nutritional Supplements  (ANTI-INFLAMMATORY ENZYME PO) Take 4 tablets by mouth 2 (Two) Times a Day.   • OMEGA-3 KRILL OIL PO Take 1 capsule by mouth Daily.   • ondansetron ODT (ZOFRAN ODT) 4 MG disintegrating tablet Take 1 tablet by mouth Every 8 (Eight) Hours As Needed for Nausea (Take with pain medication if it makes you nauseated).   • predniSONE (DELTASONE) 20 MG tablet 3 PO qd x 3 days, then 2 PO qd x 3 days, then 1 PO qd x 3 days.   • tamsulosin (FLOMAX) 0.4 MG capsule 24 hr capsule Take 1 capsule by mouth Daily.   • Testosterone Cypionate (DEPO-TESTOSTERONE) 200 MG/ML injection Inject 200 mg into the shoulder, thigh, or buttocks 1 (One) Time Per Week.   • traMADol (ULTRAM) 50 MG tablet 1 Oral QHS PRN severe pain   • valsartan (DIOVAN) 320 MG tablet Take  by mouth.     No current facility-administered medications on file prior to visit.      Current Medications:  Scheduled Meds:  PRN Meds:.    I have reviewed the patient's medical history in detail and updated the computerized patient record.  Review and summarization of old records include:    Past Medical History:   Diagnosis Date   • Atherosclerotic heart disease of native coronary artery without angina pectoris    • Benign essential hypertension    • CKD (chronic kidney disease), stage III (CMS/HCC)    • Degeneration of cervical intervertebral disc    • Depression with anxiety    • ED (erectile dysfunction)    • High cholesterol    • Hypertension    • MI (myocardial infarction) (CMS/HCC)    • Spinal stenosis    • Syncope      Past Surgical History:   Procedure Laterality Date   • BACK SURGERY     • CATARACT EXTRACTION Bilateral    • COLONOSCOPY N/A 2/28/2017    Procedure: COLONOSCOPY TO CECUM;  Surgeon: Emmanuel Peterson MD;  Location: [facility] ENDOSCOPY;  Service:    • NECK SURGERY     • TONSILLECTOMY       Social History     Occupational History   • Not on file   Tobacco Use   • Smoking status: Former Smoker     Types: Cigarettes     Last attempt to quit: 1971     Years since  "quittin.1   • Smokeless tobacco: Never Used   • Tobacco comment: daily caffeine use   Substance and Sexual Activity   • Alcohol use: Yes     Comment: occasional use   • Drug use: No   • Sexual activity: Defer      Social History     Social History Narrative   • Not on file     Family History   Problem Relation Age of Onset   • Cancer Mother         breast   • Stroke Mother    • Heart disease Father        Review of Systems        Wt Readings from Last 3 Encounters:   20 83.7 kg (184 lb 9.6 oz)   19 81.6 kg (180 lb)   19 83.9 kg (185 lb)     Ht Readings from Last 3 Encounters:   20 177.8 cm (70\")   19 177.8 cm (70\")   19 177.8 cm (70\")     Body mass index is 26.49 kg/m².  Facility age limit for growth percentiles is 20 years.  Vitals:    20 1429   Temp: 97.6 °F (36.4 °C)       Physical Exam    Musculoskeletal:    Right shoulder (cta). Rotator cuff function is extremely impaired. There is a high riding humeral head with articulation of the humerus to the undersurface of the acromiohumeral articulation. AC joint is exquisitely tender and painful for patient. Axillary nerve function is well preserved. There is significant winging of the scapula with hollowing of the fossae over the proximal and distal aspects of the spine of the scapula. Forward flexion is 0-30 degrees, active abduction is 0-60 degrees. Drop arm sign is positive. External rotation against resistance is extremely painful and limited for the patient. Skin and soft tissues are essentially normal other than some amounts of swelling. The pain level is 6      Diagnostics:      Procedure:  Large Joint Arthrocentesis: R glenohumeral  Date/Time: 2020 10:44 AM  Consent given by: patient  Site marked: site marked  Timeout: Immediately prior to procedure a time out was called to verify the correct patient, procedure, equipment, support staff and site/side marked as required   Supporting Documentation  Indications: " pain   Procedure Details  Location: shoulder - R glenohumeral  Preparation: Patient was prepped and draped in the usual sterile fashion  Needle size: 25 G  Approach: posterior  Medications administered: 160 mg methylPREDNISolone acetate 80 MG/ML  Patient tolerance: patient tolerated the procedure well with no immediate complications            Assessment:   Keith was seen today for follow-up and pain.    Diagnoses and all orders for this visit:    Rotator cuff tear, non-traumatic, right    Other orders  -     Large Joint Arthrocentesis: R glenohumeral          Plan:   · Injected patient's right shoulder joint(s)with steroid from an anterolateral approach   · Compression/brace   · Rest, ice, compression, and elevation (RICE) therapy  · OTC Tylenol 500-1000mg by mouth every 6 hours as needed for pain   · Follow up in 3 month(s)   · based on the progression of his symptoms he will eventually need reverse total shoulder arthroplasty and we have discussed that with the patient at length.  He will let me know if his symptoms progress.    Date of encounter: 02/26/2020   Chet Raymundo MD

## 2020-05-04 ENCOUNTER — TELEPHONE (OUTPATIENT)
Dept: CARDIOLOGY | Facility: CLINIC | Age: 80
End: 2020-05-04

## 2020-05-04 NOTE — TELEPHONE ENCOUNTER
Pt left msg asking for a call back.  I called pt and left a msg asking him to leave a detailed msg if he gets my VM again./prt

## 2020-05-05 NOTE — TELEPHONE ENCOUNTER
Pt left msg saying he has been having some SOA and gets extremely lightheaded when bending over.  He said he feels like he should probably be seen since it's been a while.    I called pt radha and left msg telling him he definitely needs to be seen--> LOV was 09-12-17 and he will need an in-office visit.  I told him to call the office and ask for scheduling./prt

## 2020-05-27 ENCOUNTER — CLINICAL SUPPORT (OUTPATIENT)
Dept: ORTHOPEDIC SURGERY | Facility: CLINIC | Age: 80
End: 2020-05-27

## 2020-05-27 VITALS — HEIGHT: 70 IN | BODY MASS INDEX: 25.05 KG/M2 | TEMPERATURE: 98.1 F | WEIGHT: 175 LBS

## 2020-05-27 DIAGNOSIS — M25.511 RIGHT SHOULDER PAIN, UNSPECIFIED CHRONICITY: Primary | ICD-10-CM

## 2020-05-27 DIAGNOSIS — M75.101 ROTATOR CUFF TEAR, NON-TRAUMATIC, RIGHT: ICD-10-CM

## 2020-05-27 PROCEDURE — 99212 OFFICE O/P EST SF 10 MIN: CPT | Performed by: ORTHOPAEDIC SURGERY

## 2020-05-27 PROCEDURE — 73030 X-RAY EXAM OF SHOULDER: CPT | Performed by: ORTHOPAEDIC SURGERY

## 2020-05-27 PROCEDURE — 20610 DRAIN/INJ JOINT/BURSA W/O US: CPT | Performed by: ORTHOPAEDIC SURGERY

## 2020-05-27 RX ORDER — AMLODIPINE BESYLATE 5 MG/1
5 TABLET ORAL DAILY
COMMUNITY
Start: 2020-03-02 | End: 2020-06-05 | Stop reason: ALTCHOICE

## 2020-05-27 RX ORDER — METHYLPREDNISOLONE ACETATE 80 MG/ML
160 INJECTION, SUSPENSION INTRA-ARTICULAR; INTRALESIONAL; INTRAMUSCULAR; SOFT TISSUE
Status: COMPLETED | OUTPATIENT
Start: 2020-05-27 | End: 2020-05-27

## 2020-05-27 RX ADMIN — METHYLPREDNISOLONE ACETATE 160 MG: 80 INJECTION, SUSPENSION INTRA-ARTICULAR; INTRALESIONAL; INTRAMUSCULAR; SOFT TISSUE at 14:04

## 2020-05-27 NOTE — PROGRESS NOTES
Procedure   Large Joint Arthrocentesis: R glenohumeral  Date/Time: 5/27/2020 2:04 PM  Consent given by: patient  Site marked: site marked  Timeout: Immediately prior to procedure a time out was called to verify the correct patient, procedure, equipment, support staff and site/side marked as required   Supporting Documentation  Indications: pain and joint swelling   Procedure Details  Location: shoulder - R glenohumeral  Preparation: Patient was prepped and draped in the usual sterile fashion  Needle size: 25 G  Approach: anterolateral  Medications administered: 160 mg methylPREDNISolone acetate 80 MG/ML; 2 mL lidocaine (cardiac)  Patient tolerance: patient tolerated the procedure well with no immediate complications

## 2020-05-27 NOTE — PROGRESS NOTES
INJECTION    Patient: Keith Hankins Jr.    YOB: 1940    MRN: [id number]    Chief Complaints: Right shoulder pain with limited range of motion.    History of Present Illness:  Patient returns today for follow-up on right shoulder pain. His pain is generalized in the shoulder, has been progressive and remains intermittent . His pain is worsened by reaching backward, reaching forward, reaching across body and improved with ice, Tylenol and injections.    This problem is not new to this examiner.     Allergies: No Known Allergies    Medications:   Home Medications:  Current Outpatient Medications on File Prior to Visit   Medication Sig   • acetaminophen (TYLENOL) 650 MG 8 hr tablet Take 650 mg by mouth 2 (Two) Times a Day.   • acetaminophen-codeine (TYLENOL #3) 300-30 MG per tablet TAKE 1 TAB BY MOUTH EVERY 12 HOURS AS NEEDED FOR MODERATE PAIN   • acetaminophen-codeine (TYLENOL #3) 300-30 MG per tablet 1-2 pills PO Q6 hours as needed for pain   • allopurinol (ZYLOPRIM) 100 MG tablet Take 100 mg by mouth Daily.   • amLODIPine (NORVASC) 10 MG tablet Take 10 mg by mouth Every Night.   • amLODIPine (NORVASC) 5 MG tablet Take 5 mg by mouth Daily.   • aspirin (ECOTRIN) 325 MG EC tablet Take 325 mg by mouth Daily.   • atorvastatin (LIPITOR) 40 MG tablet Take 40 mg by mouth Daily.   • buPROPion SR (WELLBUTRIN SR) 150 MG 12 hr tablet Take 1 tablet by mouth 2 (Two) Times a Day.   • buPROPion XL (WELLBUTRIN XL) 150 MG 24 hr tablet Take 150 mg by mouth Every Morning.   • escitalopram (LEXAPRO) 10 MG tablet Take 10 mg by mouth Daily.   • furosemide (LASIX) 20 MG tablet Take 1 tablet by mouth Daily. As directed   • HYDROcodone-acetaminophen (NORCO) 5-325 MG per tablet Take 1 tablet by mouth Every 6 (Six) Hours As Needed for Severe Pain .   • irbesartan (AVAPRO) 150 MG tablet Take 300 mg by mouth Daily.   • metoprolol succinate XL (TOPROL-XL) 50 MG 24 hr tablet Take 1 tablet by mouth Daily.   • Multiple  Vitamins-Minerals (CENTRUM SILVER ULTRA MENS PO) Take 1 tablet by mouth Daily.   • Nutritional Supplements (ANTI-INFLAMMATORY ENZYME PO) Take 4 tablets by mouth 2 (Two) Times a Day.   • OMEGA-3 KRILL OIL PO Take 1 capsule by mouth Daily.   • ondansetron ODT (ZOFRAN ODT) 4 MG disintegrating tablet Take 1 tablet by mouth Every 8 (Eight) Hours As Needed for Nausea (Take with pain medication if it makes you nauseated).   • predniSONE (DELTASONE) 20 MG tablet 3 PO qd x 3 days, then 2 PO qd x 3 days, then 1 PO qd x 3 days.   • tamsulosin (FLOMAX) 0.4 MG capsule 24 hr capsule Take 1 capsule by mouth Daily.   • Testosterone Cypionate (DEPO-TESTOSTERONE) 200 MG/ML injection Inject 200 mg into the shoulder, thigh, or buttocks 1 (One) Time Per Week.   • traMADol (ULTRAM) 50 MG tablet 1 Oral QHS PRN severe pain   • valsartan (DIOVAN) 320 MG tablet Take  by mouth.     No current facility-administered medications on file prior to visit.      Current Medications:  Scheduled Meds:  PRN Meds:.    I have reviewed the patient's medical history in detail and updated the computerized patient record.  Review and summarization of old records include:    Past Medical History:   Diagnosis Date   • Atherosclerotic heart disease of native coronary artery without angina pectoris    • Benign essential hypertension    • CKD (chronic kidney disease), stage III (CMS/HCC)    • Degeneration of cervical intervertebral disc    • Depression with anxiety    • ED (erectile dysfunction)    • High cholesterol    • Hypertension    • MI (myocardial infarction) (CMS/[facility])    • Spinal stenosis    • Syncope      Past Surgical History:   Procedure Laterality Date   • BACK SURGERY     • CATARACT EXTRACTION Bilateral    • COLONOSCOPY N/A 2/28/2017    Procedure: COLONOSCOPY TO CECUM;  Surgeon: Emmanuel Peterson MD;  Location: [facility] ENDOSCOPY;  Service:    • NECK SURGERY     • TONSILLECTOMY       Social History     Occupational History   • Not on file   Tobacco Use  "  • Smoking status: Former Smoker     Types: Cigarettes     Last attempt to quit: 1971     Years since quittin.4   • Smokeless tobacco: Never Used   • Tobacco comment: daily caffeine use   Substance and Sexual Activity   • Alcohol use: Yes     Comment: occasional use   • Drug use: No   • Sexual activity: Defer      Social History     Social History Narrative   • Not on file     Family History   Problem Relation Age of Onset   • Cancer Mother         breast   • Stroke Mother    • Heart disease Father        Review of Systems  Constitutional: Negative for appetite change.   HENT: Negative.    Eyes: Negative.    Respiratory: Negative.    Cardiovascular: Negative.    Gastrointestinal: Negative.    Endocrine: Negative.    Genitourinary: Negative.    Musculoskeletal: See details of exam below.  Skin: Negative.    Allergic/Immunologic: Negative.    Hematological: Negative.    Psychiatric/Behavioral: Negative.          Wt Readings from Last 3 Encounters:   20 79.4 kg (175 lb)   20 83.7 kg (184 lb 9.6 oz)   19 81.6 kg (180 lb)     Ht Readings from Last 3 Encounters:   20 177.8 cm (70\")   20 177.8 cm (70\")   19 177.8 cm (70\")     Body mass index is 25.11 kg/m².  Facility age limit for growth percentiles is 20 years.  Vitals:    20 1405   Temp: 98.1 °F (36.7 °C)       Physical Exam  Constitutional: Patient is oriented to person, place, and time. Appears well-developed and well-nourished.   HENT:   Head: Normocephalic and atraumatic.   Eyes: Conjunctivae and EOM are normal. Pupils are equal, round, and reactive to light.   Cardiovascular: Normal rate, regular rhythm, normal heart sounds and intact distal pulses.   Pulmonary/Chest: Effort normal and breath sounds normal.   Musculoskeletal:   See detailed exam below   Neurological: Alert and oriented to person, place, and time. No sensory deficit. Coordination normal.   Skin: Skin is warm and dry. Capillary refill takes less than 2 " seconds. No rash noted. No erythema.   Psychiatric: Patient has a normal mood and affect. His behavior is normal. Judgment and thought content normal.   Nursing note and vitals reviewed.    Musculoskeletal:    Right shoulder (rct). The shoulder is extremely tender anteriorly. There is tenderness over the insertion of the rotator cuff over the greater tuberosity of the humerus. Slight proximal migration of the humeral head is noted. AC joint is tender. Crossover adduction test is positive. Drop arm sign is positive. Forward flexion is 0-120 degrees, abduction is 0-120 degrees, external rotation is 0-30 degrees. Patient has mild atrophy both of the supra and infraspinatus fossa. Sulcus sign is negative. There is no evidence of multidirectional instability. Neer test is positive on compression. Skin and soft tissue tenderness is noted. Patient's strength in abduction and external rotation are extremely lacking. The pain level is 4.      Diagnostics:  right Shoulder X-Ray  Indication: Pain and limited range of motion right shoulder.  AP and Lateral  Findings: Sclerosis over the greater tuberosity of the humerus with early narrowing of the glenohumeral joint space.  no bony lesion  Soft tissues within normal limits  decreased joint spaces  Hardware appropriately positioned not applicable      yes prior studies available for comparison.    X-RAY was ordered and reviewed by Chet Raymundo MD        Procedure:  Procedures    Assessment:   Keith was seen today for injections, follow-up and pain.    Diagnoses and all orders for this visit:    Right shoulder pain, unspecified chronicity  -     XR Shoulder 2+ View Right  -     Large Joint Arthrocentesis: R glenohumeral    Rotator cuff tear, non-traumatic, right          Plan:   · Injected patient's right shoulder joint(s)with steroid from an anterolateral approach   · Home-based exercise program to prevent arthrofibrosis such as a frozen shoulder.  · Rest, ice, compression, and  elevation (RICE) therapy  · OTC Tylenol 500-1000mg by mouth every 6 hours as needed for pain   · Follow up in 3-4 month(s)    Date of encounter: 05/27/2020   Chet Raymundo MD

## 2020-05-28 ENCOUNTER — TELEPHONE (OUTPATIENT)
Dept: CARDIOLOGY | Facility: CLINIC | Age: 80
End: 2020-05-28

## 2020-06-05 ENCOUNTER — OFFICE VISIT (OUTPATIENT)
Dept: CARDIOLOGY | Facility: CLINIC | Age: 80
End: 2020-06-05

## 2020-06-05 VITALS
BODY MASS INDEX: 25.91 KG/M2 | HEART RATE: 75 BPM | WEIGHT: 181 LBS | DIASTOLIC BLOOD PRESSURE: 64 MMHG | SYSTOLIC BLOOD PRESSURE: 126 MMHG | HEIGHT: 70 IN

## 2020-06-05 DIAGNOSIS — I25.10 CORONARY ARTERY DISEASE INVOLVING NATIVE CORONARY ARTERY OF NATIVE HEART WITHOUT ANGINA PECTORIS: Primary | ICD-10-CM

## 2020-06-05 PROCEDURE — 93000 ELECTROCARDIOGRAM COMPLETE: CPT | Performed by: INTERNAL MEDICINE

## 2020-06-05 PROCEDURE — 99214 OFFICE O/P EST MOD 30 MIN: CPT | Performed by: INTERNAL MEDICINE

## 2020-06-05 NOTE — PROGRESS NOTES
Subjective:     Encounter Date:06/05/2020      Patient ID: Keith Hankins Jr. is a 80 y.o. male.    Chief Complaint:  Coronary Artery Disease   Presents for follow-up visit. The symptoms have been stable. Compliance with diet is good. Compliance with exercise is good.   Hypertension   This is a chronic problem. The problem has been waxing and waning since onset.     81-year-old gentleman who presents today for reevaluation.  Did many many years since had seen him.  Has history of coronary artery disease as well as hypertension.  About a year ago he came off his testosterone and since that time really has not felt the same.  He is also noticed that his blood pressures been dropping.  Last time I saw him several years ago I cut his Toprol in half he decreased his valsartan on his own and with that has felt better.  1 day he felt really bad he took his blood pressure is running in the low 90s.  With his lack of testosterone he is lost a lot of dry for many things including working out which he also stopped about a year or so ago.      Review of Systems   All other systems reviewed and are negative.        ECG 12 Lead  Date/Time: 6/5/2020 3:38 PM  Performed by: Sean Thompson MD  Authorized by: Sean Thompson MD   Comparison: compared with previous ECG from 7/27/2017  Similar to previous ECG  Rhythm: sinus rhythm    Clinical impression: normal ECG               Objective:     Physical Exam   Constitutional: He is oriented to person, place, and time. He appears well-developed.   HENT:   Head: Normocephalic.   Eyes: Conjunctivae are normal.   Neck: Normal range of motion.   Cardiovascular: Normal rate, regular rhythm and normal heart sounds.   Pulmonary/Chest: Breath sounds normal.   Abdominal: Soft. Bowel sounds are normal.   Musculoskeletal: Normal range of motion. He exhibits no edema.   Neurological: He is alert and oriented to person, place, and time.   Skin: Skin is warm and dry.   Psychiatric: He has  a normal mood and affect. His behavior is normal.   Vitals reviewed.      Lab Review:       Assessment:          Diagnosis Plan   1. Coronary artery disease involving native coronary artery of native heart without angina pectoris  Adult Stress Echo W/ Cont or Stress Agent if Necessary Per Protocol          Plan:         1.  Coronary artery disease.  Clinically stable but has been over 5 years since we reassess his perfusion status.  I therefore set him up for stress echocardiogram.  2.  Hypertension I stopped his Lasix I did tell him to cut his valsartan in half follow his blood pressure at home see how it is doing and we will reassess him in about 6 weeks.  3.  Low testosterone his level was 55.  There is actually good data with very low testosterone levels and actually accelerates the incidence of coronary artery disease.  I therefore recommend he goes back on it.  I did tell him about new injectables may be a lot easier than how he is done it for the last 12 years has come a long way with some of these newer injectables that are self-contained.  He is good to go back and see his urologist.  4.  Follow-up in 6 weeks sooner if issues.

## 2020-06-11 ENCOUNTER — HOSPITAL ENCOUNTER (OUTPATIENT)
Dept: CARDIOLOGY | Facility: HOSPITAL | Age: 80
Discharge: HOME | End: 2020-06-11
Admitting: INTERNAL MEDICINE

## 2020-06-11 VITALS
SYSTOLIC BLOOD PRESSURE: 120 MMHG | HEART RATE: 79 BPM | DIASTOLIC BLOOD PRESSURE: 60 MMHG | WEIGHT: 181 LBS | OXYGEN SATURATION: 96 % | HEIGHT: 70 IN | BODY MASS INDEX: 25.91 KG/M2

## 2020-06-11 DIAGNOSIS — I25.10 CORONARY ARTERY DISEASE INVOLVING NATIVE CORONARY ARTERY OF NATIVE HEART WITHOUT ANGINA PECTORIS: ICD-10-CM

## 2020-06-11 LAB
ASCENDING AORTA: 3.1 CM
BH CV ECHO MEAS - ACS: 2.1 CM
BH CV ECHO MEAS - AO MAX PG: 6.7 MMHG
BH CV ECHO MEAS - AO ROOT AREA (BSA CORRECTED): 1.6
BH CV ECHO MEAS - AO ROOT AREA: 8.1 CM^2
BH CV ECHO MEAS - AO ROOT DIAM: 3.2 CM
BH CV ECHO MEAS - AO V2 MAX: 129.7 CM/SEC
BH CV ECHO MEAS - ASC AORTA: 3.1 CM
BH CV ECHO MEAS - BSA(HAYCOCK): 2 M^2
BH CV ECHO MEAS - BSA: 2 M^2
BH CV ECHO MEAS - BZI_BMI: 26 KILOGRAMS/M^2
BH CV ECHO MEAS - BZI_METRIC_HEIGHT: 177.8 CM
BH CV ECHO MEAS - BZI_METRIC_WEIGHT: 82.1 KG
BH CV ECHO MEAS - EDV(MOD-SP2): 80 ML
BH CV ECHO MEAS - EDV(MOD-SP4): 72 ML
BH CV ECHO MEAS - EDV(TEICH): 64 ML
BH CV ECHO MEAS - EF(CUBED): 59.9 %
BH CV ECHO MEAS - EF(MOD-BP): 71.8 %
BH CV ECHO MEAS - EF(MOD-SP2): 83.8 %
BH CV ECHO MEAS - EF(MOD-SP4): 77.8 %
BH CV ECHO MEAS - EF(TEICH): 52.2 %
BH CV ECHO MEAS - ESV(MOD-SP2): 13 ML
BH CV ECHO MEAS - ESV(MOD-SP4): 16 ML
BH CV ECHO MEAS - ESV(TEICH): 30.6 ML
BH CV ECHO MEAS - FS: 26.3 %
BH CV ECHO MEAS - IVS/LVPW: 1.1
BH CV ECHO MEAS - IVSD: 1.3 CM
BH CV ECHO MEAS - LAT PEAK E' VEL: 5.4 CM/SEC
BH CV ECHO MEAS - LV DIASTOLIC VOL/BSA (35-75): 36 ML/M^2
BH CV ECHO MEAS - LV MASS(C)D: 158.9 GRAMS
BH CV ECHO MEAS - LV MASS(C)DI: 79.4 GRAMS/M^2
BH CV ECHO MEAS - LV SYSTOLIC VOL/BSA (12-30): 8 ML/M^2
BH CV ECHO MEAS - LVIDD: 3.9 CM
BH CV ECHO MEAS - LVIDS: 2.8 CM
BH CV ECHO MEAS - LVLD AP2: 7.2 CM
BH CV ECHO MEAS - LVLD AP4: 7.1 CM
BH CV ECHO MEAS - LVLS AP2: 6.2 CM
BH CV ECHO MEAS - LVLS AP4: 5.1 CM
BH CV ECHO MEAS - LVPWD: 1.2 CM
BH CV ECHO MEAS - MED PEAK E' VEL: 4.4 CM/SEC
BH CV ECHO MEAS - MV A DUR: 0.09 SEC
BH CV ECHO MEAS - MV A MAX VEL: 88.6 CM/SEC
BH CV ECHO MEAS - MV DEC SLOPE: 300 CM/SEC^2
BH CV ECHO MEAS - MV DEC TIME: 0.22 SEC
BH CV ECHO MEAS - MV E MAX VEL: 67.3 CM/SEC
BH CV ECHO MEAS - MV E/A: 0.76
BH CV ECHO MEAS - MV P1/2T MAX VEL: 77.3 CM/SEC
BH CV ECHO MEAS - MV P1/2T: 75.5 MSEC
BH CV ECHO MEAS - MVA P1/2T LCG: 2.8 CM^2
BH CV ECHO MEAS - MVA(P1/2T): 2.9 CM^2
BH CV ECHO MEAS - PULM A REVS DUR: 0.09 SEC
BH CV ECHO MEAS - PULM A REVS VEL: 39.4 CM/SEC
BH CV ECHO MEAS - PULM DIAS VEL: 73.3 CM/SEC
BH CV ECHO MEAS - PULM S/D: 0.89
BH CV ECHO MEAS - PULM SYS VEL: 65.6 CM/SEC
BH CV ECHO MEAS - RV BASE (<4.1) - OBSOLETE: 2.3 CM
BH CV ECHO MEAS - RV LENGTH (<8.5) - OBSOLETE: 7.2 CM
BH CV ECHO MEAS - SI(CUBED): 17.1 ML/M^2
BH CV ECHO MEAS - SI(MOD-SP2): 33.5 ML/M^2
BH CV ECHO MEAS - SI(MOD-SP4): 28 ML/M^2
BH CV ECHO MEAS - SI(TEICH): 16.7 ML/M^2
BH CV ECHO MEAS - SV(CUBED): 34.3 ML
BH CV ECHO MEAS - SV(MOD-SP2): 67 ML
BH CV ECHO MEAS - SV(MOD-SP4): 56 ML
BH CV ECHO MEAS - SV(TEICH): 33.4 ML
BH CV ECHO MEAS - TAPSE (>1.6): 1.9 CM2
BH CV ECHO MEAS - TR MAX PG: 32 MMHG
BH CV ECHO MEAS - TR MAX VEL: 283.7 CM/SEC
BH CV ECHO MEASUREMENTS AVERAGE E/E' RATIO: 13.73
BH CV STRESS BP STAGE 1: NORMAL
BH CV STRESS BP STAGE 2: NORMAL
BH CV STRESS DURATION MIN STAGE 1: 3
BH CV STRESS DURATION MIN STAGE 2: 3
BH CV STRESS DURATION SEC STAGE 1: 0
BH CV STRESS DURATION SEC STAGE 2: 0
BH CV STRESS ECHO POST STRESS EJECTION FRACTION EF: 81 %
BH CV STRESS GRADE STAGE 1: 10
BH CV STRESS GRADE STAGE 2: 12
BH CV STRESS HR STAGE 1: 113
BH CV STRESS HR STAGE 2: 132
BH CV STRESS METS STAGE 1: 5
BH CV STRESS METS STAGE 2: 7.5
BH CV STRESS PROTOCOL 1: NORMAL
BH CV STRESS SPEED STAGE 1: 1.7
BH CV STRESS SPEED STAGE 2: 2.5
BH CV STRESS STAGE 1: 1
BH CV STRESS STAGE 2: 2
BH CV XLRA - RV BASE: 2.3 CM
BH CV XLRA - RV LENGTH: 7.2 CM
BH CV XLRA - RV MID: 2.2 CM
BH CV XLRA - TDI S': 14 CM/SEC
LEFT ATRIUM VOLUME INDEX: 13 ML/M2
LV EF 2D ECHO EST: 72 %
MAXIMAL PREDICTED HEART RATE: 141 BPM
PERCENT MAX PREDICTED HR: 93.62 %
SINUS: 3 CM
STJ: 3.1 CM
STRESS BASELINE BP: NORMAL MMHG
STRESS BASELINE HR: 79 BPM
STRESS PERCENT HR: 110 %
STRESS POST ESTIMATED WORKLOAD: 7.5 METS
STRESS POST EXERCISE DUR MIN: 6 MIN
STRESS POST EXERCISE DUR SEC: 0 SEC
STRESS POST PEAK BP: NORMAL MMHG
STRESS POST PEAK HR: 132 BPM
STRESS TARGET HR: 120 BPM

## 2020-06-11 PROCEDURE — 93018 CV STRESS TEST I&R ONLY: CPT | Performed by: INTERNAL MEDICINE

## 2020-06-11 PROCEDURE — 93320 DOPPLER ECHO COMPLETE: CPT

## 2020-06-11 PROCEDURE — 93352 ADMIN ECG CONTRAST AGENT: CPT | Performed by: INTERNAL MEDICINE

## 2020-06-11 PROCEDURE — 93320 DOPPLER ECHO COMPLETE: CPT | Performed by: INTERNAL MEDICINE

## 2020-06-11 PROCEDURE — 93325 DOPPLER ECHO COLOR FLOW MAPG: CPT

## 2020-06-11 PROCEDURE — 93325 DOPPLER ECHO COLOR FLOW MAPG: CPT | Performed by: INTERNAL MEDICINE

## 2020-06-11 PROCEDURE — 93017 CV STRESS TEST TRACING ONLY: CPT

## 2020-06-11 PROCEDURE — 25010000002 PERFLUTREN (DEFINITY) 8.476 MG IN SODIUM CHLORIDE 0.9 % 10 ML INJECTION: Performed by: INTERNAL MEDICINE

## 2020-06-11 PROCEDURE — 93350 STRESS TTE ONLY: CPT | Performed by: INTERNAL MEDICINE

## 2020-06-11 PROCEDURE — 93016 CV STRESS TEST SUPVJ ONLY: CPT | Performed by: INTERNAL MEDICINE

## 2020-06-11 PROCEDURE — 93350 STRESS TTE ONLY: CPT

## 2020-06-11 RX ADMIN — PERFLUTREN 3 ML: 6.52 INJECTION, SUSPENSION INTRAVENOUS at 14:24

## 2020-06-25 RX ORDER — VALSARTAN 320 MG/1
160 TABLET ORAL DAILY
Qty: 45 TABLET | Refills: 3 | Status: SHIPPED | OUTPATIENT
Start: 2020-06-25 | End: 2021-06-28

## 2020-10-05 NOTE — PROGRESS NOTES
Subjective   History of Present Illness: Keith Hankins Jr. is a 80 y.o. male is here today as a self referral for intermittent neck pain with tingling in his right arm that began 1 month ago. He reports neck stiffness. He denies any conservative treatment and denies any new event that led to the aspect of pain.  He states since his anterior cervical discectomy and fusion several years ago he is had no symptoms.  He is concerned that he may have developed adjacent level disease.    Mr. Hankins has history of a L3-L5 laminectomy on 08/07/15 and he had a C4-C6 ACDF several years ago by me.     Neck Pain   The problem occurs intermittently. The problem has been gradually worsening. The pain is present in the right side, midline and left side. The quality of the pain is described as aching. The pain is moderate. The symptoms are aggravated by twisting and position. Stiffness is present all day. Associated symptoms include tingling (right arm). Pertinent negatives include no chest pain, numbness, pain with swallowing, trouble swallowing or weakness.       The following portions of the patient's history were reviewed and updated as appropriate: allergies, current medications, past family history, past medical history, past social history, past surgical history and problem list.    Review of Systems   Constitutional: Negative for activity change.   HENT: Negative for trouble swallowing.    Respiratory: Negative for chest tightness and shortness of breath.    Cardiovascular: Negative for chest pain.   Gastrointestinal: Negative for nausea.   Endocrine: Negative for cold intolerance.   Genitourinary: Negative for difficulty urinating.   Musculoskeletal: Positive for neck pain.   Skin: Negative for rash.   Allergic/Immunologic: Negative for environmental allergies.   Neurological: Positive for tingling (right arm). Negative for weakness and numbness.   Hematological: Does not bruise/bleed easily.   Psychiatric/Behavioral:  "Negative for sleep disturbance.       Objective     Vitals:    10/15/20 1105   BP: 156/76   Pulse: 66   Temp: 97.3 °F (36.3 °C)   Weight: 82.1 kg (181 lb)   Height: 177.8 cm (70\")     Body mass index is 25.97 kg/m².      Physical Exam  Neurologic Exam    Physical Exam:    CONSTITUTIONAL: This 80 year old right handed  male appears well developed, well-nourished and in no acute distress.    HEAD & FACE: the head and face are symmetric, normocephalic and atraumatic.    EYES: Inspection of the conjunctivae and lids reveals no swelling, erythema or discharge.  Pupils are round, equal and reactive to light and there is no scleral icterus.    EARS, NOSE, MOUTH & THROAT: On inspection, the ears and nose are within normal limits.    NECK: the neck is supple and symmetric. The trachea is midline with no masses.    PULMONARY: Respiratory effort is normal with no increased work of breathing or signs of respiratory distress.    CARDIOVASCULAR: Pedal pulses are +2/4 bilaterally. Examination of the extremities shows no edema or varicosities.    LYMPHATIC: There is no palpable lymphadenopathy of the neck.    MUSCULOSKELETAL: Gait and station are within normal limits. The spine has normal alignment and range of motion.    SKIN: The skin is warm, dry and intact. anterior neck incision healed.    NEUROLOGIC:   Cranial Nerves 2-12 intact  Normal motor strength noted. Muscle bulk and tone are normal.  Sensory exam is normal to fine touch to confrontational testing bilaterally  Reflexes on the right side demonstrates 1/4 Triceps Reflex, 2/4 Biceps Reflex, 0/4 Brachioradialis Reflex and no ankle clonus on the right.   Reflexes on the left side demonstrates  1/4 Triceps Reflex, 2/4 Biceps Reflex, 0/4 Brachioradialis Reflex and no ankle clonus on the left.  Superficial/Primitive Reflexes: primitive reflexes were absent.  Menendez's, Babinski, and Clonus signs all negative.  No coordination deficit observed.  Radicular testing " showed a negative Dav (SUSI) test and negative straight leg raise.  Keyon maneuver is negative.  Cortical function is intact and without deficits. Speech is normal.    PSYCHIATRIC: oriented to person, place and time. Patient's mood and affect are normal.    Assessment/Plan   Independent Review of Radiographic Studies:      I personally reviewed the images from the following studies.    No recent imaging of his spine    Medical Decision Making:      Although my clinical exam reveals no red flags his symptoms years after anterior cervical surgery deserve evaluation to rule out adjacent level disease.  I have ordered cervical plain films of flexion-extension views and an MRI of the cervical spine to better delineate his anatomy.  Hopefully will build to find a conservative option to help his pattern of symptoms rather than another surgery.  He is reluctant to consider another surgery at his age.    Diagnoses and all orders for this visit:    1. Cervical spondylosis without myelopathy (Primary)  -     MRI Cervical Spine Without Contrast; Future  -     XR Spine Cervical Complete 4 or 5 View; Future    2. Cervical radiculitis  -     MRI Cervical Spine Without Contrast; Future  -     XR Spine Cervical Complete 4 or 5 View; Future      Return for review of completed images.

## 2020-10-15 ENCOUNTER — OFFICE VISIT (OUTPATIENT)
Dept: NEUROSURGERY | Facility: CLINIC | Age: 80
End: 2020-10-15

## 2020-10-15 VITALS
HEIGHT: 70 IN | BODY MASS INDEX: 25.91 KG/M2 | HEART RATE: 66 BPM | SYSTOLIC BLOOD PRESSURE: 156 MMHG | WEIGHT: 181 LBS | TEMPERATURE: 97.3 F | DIASTOLIC BLOOD PRESSURE: 76 MMHG

## 2020-10-15 DIAGNOSIS — M54.12 CERVICAL RADICULITIS: ICD-10-CM

## 2020-10-15 DIAGNOSIS — M47.812 CERVICAL SPONDYLOSIS WITHOUT MYELOPATHY: Primary | ICD-10-CM

## 2020-10-15 PROCEDURE — 99204 OFFICE O/P NEW MOD 45 MIN: CPT | Performed by: NEUROLOGICAL SURGERY

## 2020-10-15 RX ORDER — TESTOSTERONE CYPIONATE 200 MG/ML
INJECTION, SOLUTION INTRAMUSCULAR
COMMUNITY
Start: 2020-09-17 | End: 2022-08-26

## 2020-10-15 RX ORDER — AMLODIPINE BESYLATE 5 MG/1
5 TABLET ORAL DAILY
COMMUNITY
Start: 2020-09-15

## 2020-10-15 RX ORDER — FUROSEMIDE 20 MG/1
TABLET ORAL
COMMUNITY
Start: 2020-07-31 | End: 2021-01-21

## 2020-10-28 ENCOUNTER — CLINICAL SUPPORT (OUTPATIENT)
Dept: ORTHOPEDIC SURGERY | Facility: CLINIC | Age: 80
End: 2020-10-28

## 2020-10-28 VITALS — BODY MASS INDEX: 25.91 KG/M2 | WEIGHT: 181 LBS | TEMPERATURE: 98.2 F | HEIGHT: 70 IN

## 2020-10-28 DIAGNOSIS — M75.101 ROTATOR CUFF TEAR, NON-TRAUMATIC, RIGHT: Primary | ICD-10-CM

## 2020-10-28 DIAGNOSIS — M65.341 TRIGGER RING FINGER OF RIGHT HAND: ICD-10-CM

## 2020-10-28 PROCEDURE — 99214 OFFICE O/P EST MOD 30 MIN: CPT | Performed by: ORTHOPAEDIC SURGERY

## 2020-10-28 PROCEDURE — 20610 DRAIN/INJ JOINT/BURSA W/O US: CPT | Performed by: ORTHOPAEDIC SURGERY

## 2020-10-28 RX ORDER — METHYLPREDNISOLONE ACETATE 80 MG/ML
160 INJECTION, SUSPENSION INTRA-ARTICULAR; INTRALESIONAL; INTRAMUSCULAR; SOFT TISSUE
Status: COMPLETED | OUTPATIENT
Start: 2020-10-28 | End: 2020-10-28

## 2020-10-28 RX ADMIN — METHYLPREDNISOLONE ACETATE 160 MG: 80 INJECTION, SUSPENSION INTRA-ARTICULAR; INTRALESIONAL; INTRAMUSCULAR; SOFT TISSUE at 14:26

## 2020-10-28 NOTE — PROGRESS NOTES
FOLLOW UP VISIT    Patient: Keith Hankins Jr.  ?  YOB: 1940    MRN: [id number]  ?  Chief Complaint   Patient presents with   • Right Shoulder - Follow-up, Pain   CC: Right shoulder pain and right ring finger locking trigger digit.  ?  HPI: The patient presents back to the office with increasing right shoulder pain and discomfort.  Patient has weakness in abduction and difficulty with cross body activities.  He works in the kitchen remodeling business and finds it very difficult to reach overhead.  He has a lot of nighttime pain and discomfort as well.  He is also having a lot of issues with his right ring finger.  He has been diagnosed with a trigger digit of the right ring finger.  He denies any direct history of trauma to this location but has had trauma in the past because of his occupation.  He is also had a history of triggering of his left hand involving the thumb and the index fingers.  He does not recall but possibly has had a surgical release of an A1 pulley on his left hand several years ago.  He wants to transfer his care from a hand surgeon to my office and he has been receiving steroid injections for this trigger finger.  He is considering surgical release of the right ring finger at this point because of increasing symptoms and discomfort with locking.    Pain Location: right shoulder(s) and right wrist  Radiation: none  Quality: dull, aching  Intensity/Severity: moderate  Duration: Several months  Onset quality: gradual   Timing: intermittent  Aggravating Factors: reaching across the body and repetitive motion  Alleviating Factors: stretching  Previous Episodes: yes  Associated Symptoms: pain, swelling, clicking/popping  ADL Affected: personal cares, grooming and dressing upper body  Previous Treatment: Intra-articular injections to the right shoulder with steroid.    This patient is an established patient.  This problem is not new to this examiner.      Allergies: No Known  Allergies    Medications:   Home Medications:  Current Outpatient Medications on File Prior to Visit   Medication Sig   • acetaminophen (TYLENOL) 650 MG 8 hr tablet Take 650 mg by mouth 2 (Two) Times a Day.   • allopurinol (ZYLOPRIM) 100 MG tablet Take 100 mg by mouth Daily.   • amLODIPine (NORVASC) 10 MG tablet Take 10 mg by mouth Every Night.   • amLODIPine (NORVASC) 5 MG tablet Take 5 mg by mouth Daily.   • aspirin (ECOTRIN) 325 MG EC tablet Take 325 mg by mouth Daily.   • atorvastatin (LIPITOR) 40 MG tablet Take 40 mg by mouth Daily.   • buPROPion XL (WELLBUTRIN XL) 150 MG 24 hr tablet Take 150 mg by mouth Every Morning.   • escitalopram (LEXAPRO) 10 MG tablet Take 10 mg by mouth Daily.   • furosemide (LASIX) 20 MG tablet TAKE 1 TABLET BY MOTUH ONCE DAILY   • metoprolol succinate XL (TOPROL-XL) 50 MG 24 hr tablet Take 25 mg by mouth Daily.   • Multiple Vitamins-Minerals (CENTRUM SILVER ULTRA MENS PO) Take 1 tablet by mouth Daily.   • OMEGA-3 KRILL OIL PO Take 1 capsule by mouth Daily.   • tamsulosin (FLOMAX) 0.4 MG capsule 24 hr capsule Take 1 capsule by mouth Daily.   • Testosterone Cypionate (DEPOTESTOTERONE CYPIONATE) 200 MG/ML injection TAKE 1 ML EVERY 2 WEEKS   • valsartan (Diovan) 320 MG tablet Take 0.5 tablets by mouth Daily.     No current facility-administered medications on file prior to visit.      Current Medications:  Scheduled Meds:  PRN Meds:.    I have reviewed the patient's medical history in detail and updated the computerized patient record.  Review and summarization of old records include:    Past Medical History:   Diagnosis Date   • Atherosclerotic heart disease of native coronary artery without angina pectoris    • Benign essential hypertension    • CKD (chronic kidney disease), stage III    • Degeneration of cervical intervertebral disc    • Depression with anxiety    • ED (erectile dysfunction)    • High cholesterol    • Hypertension    • MI (myocardial infarction) (CMS/HCC)    • Spinal  "stenosis    • Syncope      Past Surgical History:   Procedure Laterality Date   • BACK SURGERY     • CATARACT EXTRACTION Bilateral    • COLONOSCOPY N/A 2017    Procedure: COLONOSCOPY TO CECUM;  Surgeon: Emmanuel Peterson MD;  Location: [facility] ENDOSCOPY;  Service:    • NECK SURGERY     • TONSILLECTOMY       Social History     Occupational History   • Not on file   Tobacco Use   • Smoking status: Former Smoker     Types: Cigarettes     Quit date:      Years since quittin.8   • Smokeless tobacco: Never Used   • Tobacco comment: daily caffeine use   Substance and Sexual Activity   • Alcohol use: Yes     Comment: occasional use   • Drug use: No   • Sexual activity: Defer      Social History     Social History Narrative   • Not on file     Family History   Problem Relation Age of Onset   • Cancer Mother         breast   • Stroke Mother    • Heart disease Father          Review of Systems  Constitutional: Negative for appetite change.   HENT: Negative.    Eyes: Negative.    Respiratory: Negative.    Cardiovascular: Negative.    Gastrointestinal: Negative.    Endocrine: Negative.    Genitourinary: Negative.    Musculoskeletal: See details of exam below.  Skin: Negative.    Allergic/Immunologic: Negative.    Hematological: Negative.    Psychiatric/Behavioral: Negative.         Wt Readings from Last 3 Encounters:   10/28/20 82.1 kg (181 lb)   10/15/20 82.1 kg (181 lb)   20 82.1 kg (181 lb)     Ht Readings from Last 3 Encounters:   10/28/20 177.8 cm (70\")   10/15/20 177.8 cm (70\")   20 177.8 cm (70\")     Body mass index is 25.97 kg/m².  Facility age limit for growth percentiles is 20 years.  Vitals:    10/28/20 1424   Temp: 98.2 °F (36.8 °C)         Physical Exam  Constitutional: Patient is oriented to person, place, and time. Appears well-developed and well-nourished.   HENT:   Head: Normocephalic and atraumatic.   Eyes: Conjunctivae and EOM are normal. Pupils are equal, round, and reactive to light. "   Cardiovascular: Normal rate, regular rhythm, normal heart sounds and intact distal pulses.   Pulmonary/Chest: Effort normal and breath sounds normal.   Musculoskeletal:   See detailed exam below   Neurological: Alert and oriented to person, place, and time. No sensory deficit. Coordination normal.   Skin: Skin is warm and dry. Capillary refill takes less than 2 seconds. No rash noted. No erythema.   Psychiatric: Patient has a normal mood and affect. His behavior is normal. Judgment and thought content normal.   Nursing note and vitals reviewed.      Ortho Exam:   Right shoulder (cta). Rotator cuff function is extremely impaired. There is a high riding humeral head with articulation of the humerus to the undersurface of the acromiohumeral articulation. AC joint is exquisitely tender and painful for patient. Axillary nerve function is well preserved. There is significant winging of the scapula with hollowing of the fossae over the proximal and distal aspects of the spine of the scapula. Forward flexion is 0-30 degrees, active abduction is 0-60 degrees. Drop arm sign is positive. External rotation against resistance is extremely painful and limited for the patient. Skin and soft tissues are essentially normal other than some amounts of swelling. The pain level is 5  Right hand-trigger. The patient is right hand dominant. Trigger Ring finger. Skin is swollen over volar aspect of MCP joint. Flexor and extensor tendon functions are both well maintained. Tender nodule over the volar aspect of MCP joint. Capillary refill is two seconds with a brisk return. Triggering is passively correctable. Median nerve function is normal. Radial artery pulse is palpable.        Diagnostics:  no diagnostic testing performed this visit       Assessment:  Diagnoses and all orders for this visit:    1. Rotator cuff tear, non-traumatic, right (Primary)  -     Large Joint Arthrocentesis: R glenohumeral    2. Trigger ring finger of right  hand    Other orders  -     Cancel: Large Joint Arthrocentesis          Procedures  ?    Plan    · Compression/brace to the hand to prevent locking and catching of the ring finger triggering.  · Discussed with the patient about the pathology of the trigger finger.  · Injected patient's right shoulder with a steroid from an anterolateral approach.  · Recent benefits of surgical intervention of the trigger finger release discussed with the patient at length.  He will let me know when he is ready for that form of surgical intervention.  · Calcium and vitamin D for bone health.  · Glucosamine chondroitin and turmeric for cartilage health.  · Rest, ice, compression, and elevation (RICE) therapy  · Stretching and strengthening exercises of the flexors and abductors of the shoulder.  · OTC Tylenol 500-1000mg by mouth every 6 hours as needed for pain   · Follow up in 3 month(s).  · Risks of surgical procedure, possibility of infection, DVT, continued pain, limited strength and range of motion, neurological deficit, scar tissue formation, recurrence of triggering and further surgical intervention discussed with the patient. Patient understands that surgery will benefit triggering.  · Time spent in the office today with the patient discussing surgical options is 30 minutes.  Greater than 50% of my time was spent face-to-face going over treatment options for the right ring finger release as well as staging the procedures for the left side.    Date of encounter: 10/28/2020   Chet Raymundo MD

## 2020-10-28 NOTE — PROGRESS NOTES
Procedure   Large Joint Arthrocentesis: R glenohumeral  Date/Time: 10/28/2020 2:26 PM  Consent given by: patient  Site marked: site marked  Timeout: Immediately prior to procedure a time out was called to verify the correct patient, procedure, equipment, support staff and site/side marked as required   Supporting Documentation  Indications: pain and joint swelling   Procedure Details  Location: shoulder - R glenohumeral  Preparation: Patient was prepped and draped in the usual sterile fashion  Needle size: 25 G  Approach: anterolateral  Medications administered: 2 mL lidocaine (cardiac); 160 mg methylPREDNISolone acetate 80 MG/ML  Patient tolerance: patient tolerated the procedure well with no immediate complications

## 2020-11-04 ENCOUNTER — TELEPHONE (OUTPATIENT)
Dept: NEUROSURGERY | Facility: CLINIC | Age: 80
End: 2020-11-04

## 2020-11-04 ENCOUNTER — APPOINTMENT (OUTPATIENT)
Dept: MRI IMAGING | Facility: HOSPITAL | Age: 80
End: 2020-11-04

## 2020-11-04 NOTE — TELEPHONE ENCOUNTER
I called and LVM for patient to call the office. Patient appointment needs to be moved after his MRI. There needs to be at least 2 business day in between MRI appt and appt with Dr. Mackay.

## 2020-11-11 NOTE — PROGRESS NOTES
Subjective   History of Present Illness: Keith Hankins Jr. is a 80 y.o. male is here today for follow-up with a new cervical MRI and x-ray's that were ordered for intermittent neck pain with tingling in his right arm.  Says things are not as bad as they were when I saw him a few weeks ago.  He completed the MRI and the plain films for today's visit.    Today, Mr. Hankins reports there have been no changes in symptoms since last being in the office. He also reports an itching sensation on the right side of his face and neck.  He can exacerbate the symptoms when he moves his neck a certain way such as extension and right bending but he can also exacerbate some of the arm symptoms by elevating his right shoulder and he does have a known right shoulder issue as well.    While in the room and during my examination of the patient I wore a mask and eye protection.  I washed my hands before and after this patient encounter.  The patient was also wearing a mask.    Neck Pain   The problem occurs intermittently. The problem has been gradually worsening. The pain is present in the left side, midline and right side. The quality of the pain is described as aching. The pain is moderate. The symptoms are aggravated by twisting and position. Stiffness is present all day. Associated symptoms include tingling (right arm). Pertinent negatives include no chest pain, numbness, pain with swallowing, trouble swallowing, visual change or weakness.       The following portions of the patient's history were reviewed and updated as appropriate: allergies, current medications, past family history, past medical history, past social history, past surgical history and problem list.    Review of Systems   HENT: Negative for trouble swallowing.    Respiratory: Negative for chest tightness and shortness of breath.    Cardiovascular: Negative for chest pain.   Musculoskeletal: Positive for neck pain.   Neurological: Positive for tingling (right arm).  "Negative for weakness and numbness.       Objective     Vitals:    11/24/20 1240   BP: 143/76   Pulse: 96   Temp: 97.5 °F (36.4 °C)   Weight: 82.1 kg (181 lb)   Height: 177.8 cm (70\")     Body mass index is 25.97 kg/m².      Physical Exam  Neurologic Exam    Physical Exam:    CONSTITUTIONAL:  appears well developed, well-nourished and in no acute distress.    NECK: the neck is supple and symmetric. The trachea is midline with no masses.  Some crepitus with rotation of the neck but good range of motion in flexion-extension    PULMONARY: Respiratory effort is normal with no increased work of breathing or signs of respiratory distress.    CARDIOVASCULAR: Pedal pulses are +2/4 bilaterally. Examination of the extremities shows no edema or varicosities.    MUSCULOSKELETAL: Gait normal    SKIN: The skin is warm, dry and intact.  Anterior neck incision healed    NEUROLOGIC:   Normal motor strength noted. Muscle bulk and tone are normal.  Sensory exam is normal to all modalities.  Reflexes 1/4 and symmetrical in the upper and lower extremities  With certain neck or shoulder positions he will get some tingling in the right hand in a nondermatomal fashion  Cortical function is intact and without deficits. Speech is normal.    PSYCHIATRIC: oriented to person, place and time. Patient's mood and affect are normal.      Assessment/Plan   Independent Review of Radiographic Studies:      I personally reviewed the images from the following studies.    MRI of the cervical spine done at [facility] on 11/19/2020 reveals postoperative change from C4-C6 with no recurrent stenosis.  There is disc protrusion at C3-C4 which is very similar to the prior examination dated February 2015.    Plain films of the cervical spine done on 11/19/2020 reveal C4-C6 anterior fusion intact both bony and instrumented fusion satisfactory.  Adequate alignment.    Medical Decision Making:      MRI and plain films are reassuring that he has an adequate bony " fusion without recurrent stenosis.  He does have cervical degenerative change at the level above C3-C4 but appears to be relatively similar to what it looks like in February 2015.  Therefore I do not detect a specific radiculopathy or myelopathy in correlation with the symptoms that would justify another surgery on his neck.  He may respond very well to interventional pain management for the neck pain and some of the radiculitis symptoms he is having.    We will see him back as needed in the future    Return if symptoms worsen or fail to improve.    Diagnoses and all orders for this visit:    1. Neck pain (Primary)  -     Ambulatory Referral to Pain Management    2. Cervical spondylosis without myelopathy  -     Ambulatory Referral to Pain Management    3. Cervical radiculitis  -     Ambulatory Referral to Pain Management             Jadiel Mackay MD FACS [facility]  Neurological Surgery

## 2020-11-19 ENCOUNTER — HOSPITAL ENCOUNTER (OUTPATIENT)
Dept: MRI IMAGING | Facility: HOSPITAL | Age: 80
Discharge: HOME | End: 2020-11-19

## 2020-11-19 ENCOUNTER — HOSPITAL ENCOUNTER (OUTPATIENT)
Dept: GENERAL RADIOLOGY | Facility: HOSPITAL | Age: 80
Discharge: HOME | End: 2020-11-19

## 2020-11-19 ENCOUNTER — APPOINTMENT (OUTPATIENT)
Dept: OTHER | Facility: HOSPITAL | Age: 80
End: 2020-11-19

## 2020-11-19 DIAGNOSIS — Z09 FOLLOW UP: ICD-10-CM

## 2020-11-19 DIAGNOSIS — M54.12 CERVICAL RADICULITIS: ICD-10-CM

## 2020-11-19 DIAGNOSIS — M47.812 CERVICAL SPONDYLOSIS WITHOUT MYELOPATHY: ICD-10-CM

## 2020-11-19 PROCEDURE — 72050 X-RAY EXAM NECK SPINE 4/5VWS: CPT

## 2020-11-19 PROCEDURE — 72141 MRI NECK SPINE W/O DYE: CPT

## 2020-11-24 ENCOUNTER — OFFICE VISIT (OUTPATIENT)
Dept: NEUROSURGERY | Facility: CLINIC | Age: 80
End: 2020-11-24

## 2020-11-24 VITALS
TEMPERATURE: 97.5 F | HEART RATE: 96 BPM | BODY MASS INDEX: 25.91 KG/M2 | DIASTOLIC BLOOD PRESSURE: 76 MMHG | SYSTOLIC BLOOD PRESSURE: 143 MMHG | HEIGHT: 70 IN | WEIGHT: 181 LBS

## 2020-11-24 DIAGNOSIS — M47.812 CERVICAL SPONDYLOSIS WITHOUT MYELOPATHY: ICD-10-CM

## 2020-11-24 DIAGNOSIS — M54.12 CERVICAL RADICULITIS: ICD-10-CM

## 2020-11-24 DIAGNOSIS — M54.2 NECK PAIN: Primary | ICD-10-CM

## 2020-11-24 PROCEDURE — 99213 OFFICE O/P EST LOW 20 MIN: CPT | Performed by: NEUROLOGICAL SURGERY

## 2021-01-14 ENCOUNTER — APPOINTMENT (OUTPATIENT)
Dept: SLEEP MEDICINE | Facility: HOSPITAL | Age: 81
End: 2021-01-14

## 2021-01-21 ENCOUNTER — OFFICE VISIT (OUTPATIENT)
Dept: SLEEP MEDICINE | Facility: HOSPITAL | Age: 81
End: 2021-01-21

## 2021-01-21 VITALS
HEIGHT: 70 IN | BODY MASS INDEX: 26.34 KG/M2 | OXYGEN SATURATION: 95 % | DIASTOLIC BLOOD PRESSURE: 69 MMHG | WEIGHT: 184 LBS | SYSTOLIC BLOOD PRESSURE: 130 MMHG | HEART RATE: 92 BPM

## 2021-01-21 DIAGNOSIS — Z78.9 INTOLERANCE OF CONTINUOUS POSITIVE AIRWAY PRESSURE (CPAP) VENTILATION: ICD-10-CM

## 2021-01-21 DIAGNOSIS — G47.33 OSA (OBSTRUCTIVE SLEEP APNEA): Primary | ICD-10-CM

## 2021-01-21 PROCEDURE — G0463 HOSPITAL OUTPT CLINIC VISIT: HCPCS

## 2021-01-21 NOTE — PROGRESS NOTES
"[facility]  Telephone: [phone] / Fax: [phone] [place]  Telephone: [phone] / Fax: [phone] [place]    Referring Physician: Pallares, Clara Ann, MD  PCP: Pallares, Clara Ann, MD    Reason for consult:  sleep apnea    Keith Hankins Jr. is a 80 y.o.male  was seen in the [facility] Center today for evaluation of sleep apnea. He reports hx of long standing apneas/gasping for about 25 years. 5 years ago he had a home sleep study. It showed severe MARVA with reported AHI of 55, per patient. He got CPAP from [facility]. He started using it. He struggled for awhile to try to get it to work. However, his AHI remained high despite pressure adjusted. He stopped using the machine few months ago as he became \"unhappy with the machine\".   His machine at home is 3-4 years old.  Dry mouth was the major reason for discontinuation. He may fall asleep in the parking lot and has low energy levels. He goes to bed at MN and is up at 8m but has to snooze the alarm multiple times. He c/o loud snoring and has had witnessed apneas. He does not have a set sleep schedule. He goes to bed late and may take 3 hour nap in the afternoon.      SH- former smoker age [age], 1 coffee per day.    ROS-+nasal congestion, +post nasal drip, +cough, +depression, rest is negative.    Keith Hankins Jr.  has a past medical history of Atherosclerotic heart disease of native coronary artery without angina pectoris, Benign essential hypertension, CKD (chronic kidney disease), stage III, Degeneration of cervical intervertebral disc, Depression with anxiety, ED (erectile dysfunction), High cholesterol, Hypertension, MI (myocardial infarction) (CMS/[facility]), Spinal stenosis, and Syncope. MI in 2003.    Current Medications:    Current Outpatient Medications:   •  acetaminophen (TYLENOL) 650 MG 8 hr tablet, Take 650 mg by mouth 2 (Two) Times a Day., Disp: , Rfl:   •  acetaminophen-codeine (TYLENOL #3) 300-30 MG per tablet, " "1-2 pills PO Q6 hours as needed for pain, Disp: 24 tablet, Rfl: 0  •  allopurinol (ZYLOPRIM) 100 MG tablet, Take 100 mg by mouth Daily., Disp: , Rfl: 0  •  amLODIPine (NORVASC) 10 MG tablet, Take 10 mg by mouth Every Night., Disp: , Rfl:   •  amLODIPine (NORVASC) 5 MG tablet, Take 5 mg by mouth Daily., Disp: , Rfl:   •  aspirin (ECOTRIN) 325 MG EC tablet, Take 325 mg by mouth Daily., Disp: , Rfl:   •  atorvastatin (LIPITOR) 40 MG tablet, Take 40 mg by mouth Daily., Disp: , Rfl:   •  buPROPion XL (WELLBUTRIN XL) 150 MG 24 hr tablet, Take 150 mg by mouth Every Morning., Disp: , Rfl: 3  •  escitalopram (LEXAPRO) 10 MG tablet, Take 10 mg by mouth Daily., Disp: , Rfl: 1  •  metoprolol succinate XL (TOPROL-XL) 50 MG 24 hr tablet, Take 25 mg by mouth Daily., Disp: , Rfl:   •  Multiple Vitamins-Minerals (CENTRUM SILVER ULTRA MENS PO), Take 1 tablet by mouth Daily., Disp: , Rfl:   •  OMEGA-3 KRILL OIL PO, Take 1 capsule by mouth Daily., Disp: , Rfl:   •  tamsulosin (FLOMAX) 0.4 MG capsule 24 hr capsule, Take 1 capsule by mouth Daily., Disp: , Rfl:   •  Testosterone Cypionate (DEPOTESTOTERONE CYPIONATE) 200 MG/ML injection, TAKE 1 ML EVERY 2 WEEKS, Disp: , Rfl:   •  valsartan (Diovan) 320 MG tablet, Take 0.5 tablets by mouth Daily., Disp: 45 tablet, Rfl: 3    I have reviewed Past Medical History, Past Surgical History, Medication List, Social History and Family History as entered in Sleep Questionnaire and EPIC.    ESS  17   Vital Signs /69   Pulse 92   Ht 177.8 cm (70\")   Wt 83.5 kg (184 lb)   SpO2 95%   BMI 26.40 kg/m²  Body mass index is 26.4 kg/m².    General Alert and oriented. No acute distress noted   Pharynx/Throat Class IV Mallampati airway, large tongue, no evidence of redundant lateral pharyngeal tissue. No oral lesions. No thrush. Moist mucous membranes.   Head Normocephalic. Symmetrical. Atraumatic.    Nose No septal deviation. No drainage   Chest Wall Normal shape. Symmetric expansion with " respiration. No tenderness.   Neck Trachea midline, no thyromegaly or adenopathy    Lungs Clear to auscultation bilaterally. No wheezes. No rhonchi. No rales. Respirations regular, even and unlabored.   Heart Regular rhythm and normal rate. Normal S1 and S2. No murmur   Abdomen Soft, non-tender and non-distended. Normal bowel sounds. No masses.   Extremities Moves all extremities well. No edema   Psychiatric Normal mood and affect.       Diagnostic study- [facility]- AHI 53, RDI AHI 60- appropriately treated with CPAP 7cm.       Impression:  1. MARVA (obstructive sleep apnea)    2. Intolerance of continuous positive airway pressure (CPAP) ventilation          Plan:  Pt was asked to bring chip from CPAP and have our night sleep tech download it so that we see his latest CPAP use and residual AHIs. He was also asked to bring the mask he had a hard time tolerating so that we fit him with a different style prior to sleep study. Good sleep hygiene was strongly advised. He has been unable to tolerate higher pressures and reportedly had persistent events despite adjustments. He may need a BIPAP machine.    I discussed the pathophysiology of obstructive sleep apnea with the patient.  We discussed the adverse outcomes associated with untreated sleep-disordered breathing.  We discussed treatment modalities of obstructive sleep apnea including CPAP device. Sleep study will be scheduled to establish a definitive diagnosis of sleep disorder breathing.  Weight loss will be strongly beneficial in order to reduce the severity of sleep-disordered breathing.  Patient has narrow oropharyngeal structure.  Caution during activities that require prolonged concentration is strongly advised.  After sleep study results are available, patient will be notified, and appointment will be scheduled to discuss sleep study results and treatment recommendations.    Rx for Ambien 5mg x 1 was provided for in lab polysomnogram. Patient was  instructed to bring the Ambien tablet to the sleep lab and take at lights out . Strict instructions were given to NOT take the Ambien at home.    Instructions for the night sleep tech  It is permitted to use zolpidem 5 mg prior to 1 AM.  If patient has Obstructive Sleep Apnea on diagnostic portion with AHI > 5 please do titration with CPAP and/or BIPAP per sleep disorder center policy. If patient has Central Sleep Apnea on diagnostic portion with index > 5, do titration with Adaptive Servo Titration device. If patient requires addition of supplemental oxygen therapy during PAP titration study, inform MD in AM.     I appreciate the opportunity to participate in this patient's care.          CODEY Hallman  [facility]  Phone: [phone]      Part of this note may be an electronic transcription/translation of spoken language to printed text using the Dragon Dictation System. Some errors may exist even though the document was edited.

## 2021-01-22 RX ORDER — ZOLPIDEM TARTRATE 5 MG/1
5 TABLET ORAL NIGHTLY PRN
Qty: 1 TABLET | Refills: 0 | Status: SHIPPED | OUTPATIENT
Start: 2021-01-22 | End: 2021-09-10

## 2021-02-02 ENCOUNTER — TRANSCRIBE ORDERS (OUTPATIENT)
Dept: SLEEP MEDICINE | Facility: HOSPITAL | Age: 81
End: 2021-02-02

## 2021-02-02 ENCOUNTER — OFFICE VISIT (OUTPATIENT)
Dept: ORTHOPEDIC SURGERY | Facility: CLINIC | Age: 81
End: 2021-02-02

## 2021-02-02 VITALS — DIASTOLIC BLOOD PRESSURE: 82 MMHG | HEART RATE: 85 BPM | SYSTOLIC BLOOD PRESSURE: 150 MMHG

## 2021-02-02 DIAGNOSIS — Z01.818 OTHER SPECIFIED PRE-OPERATIVE EXAMINATION: Primary | ICD-10-CM

## 2021-02-02 DIAGNOSIS — M75.101 ROTATOR CUFF TEAR, NON-TRAUMATIC, RIGHT: Primary | ICD-10-CM

## 2021-02-02 PROCEDURE — 20610 DRAIN/INJ JOINT/BURSA W/O US: CPT | Performed by: ORTHOPAEDIC SURGERY

## 2021-02-02 RX ORDER — LIDOCAINE HYDROCHLORIDE 10 MG/ML
2 INJECTION, SOLUTION INFILTRATION; PERINEURAL
Status: COMPLETED | OUTPATIENT
Start: 2021-02-02 | End: 2021-02-02

## 2021-02-02 RX ORDER — METHYLPREDNISOLONE ACETATE 80 MG/ML
160 INJECTION, SUSPENSION INTRA-ARTICULAR; INTRALESIONAL; INTRAMUSCULAR; SOFT TISSUE
Status: COMPLETED | OUTPATIENT
Start: 2021-02-02 | End: 2021-02-02

## 2021-02-02 RX ADMIN — LIDOCAINE HYDROCHLORIDE 2 ML: 10 INJECTION, SOLUTION INFILTRATION; PERINEURAL at 12:29

## 2021-02-02 RX ADMIN — METHYLPREDNISOLONE ACETATE 160 MG: 80 INJECTION, SUSPENSION INTRA-ARTICULAR; INTRALESIONAL; INTRAMUSCULAR; SOFT TISSUE at 12:29

## 2021-02-02 NOTE — PROGRESS NOTES
Large Joint Arthrocentesis: R subacromial bursa  Date/Time: 2/2/2021 12:57 PM  Consent given by: patient  Supporting Documentation  Indications: pain   Procedure Details  Location: shoulder - R subacromial bursa  Needle size: 25 G  Medications administered: 2 mL lidocaine 1 %; 160 mg methylPREDNISolone acetate 80 MG/ML

## 2021-02-02 NOTE — PROGRESS NOTES
Chief Complaint  Follow-up of the Right Shoulder    Subjective    History of Present Illness      Keith Hankins Jr. is a 80 y.o. male who presents to [facility] for follow-up on his right shoulder.  He has had a longstanding history of rotator cuff tear.  The patient is postponing surgery as long as possible.  He was recently in a fall situation at [facility].  The patient fell awkwardly and braced his fall with his right upper extremity.  Since that time his right shoulder has bothered him consistently.  He has weakness in abduction and difficulty in sleeping in bed at night.  He also had a jerking sensation in his cervical spine which has led to evaluation and treatment for MICHAEL at the pain clinic.  The patient states that he would like to get x-rays at his next visit to his shoulder to make sure that he does not require any form of surgical intervention.  We have discussed the pathology of cuff tear arthropathy with the patient.  History of Present Illness   Pain Location: RIGHT shoulder  Radiation: none  Quality: dull, aching  Intensity/Severity: moderate  Duration: 2-3 years  Progression of symptoms: yes, progressive worsening  Onset quality: gradual   Timing: intermittent  Aggravating Factors: reaching backward, reaching forward, reaching across body  Alleviating Factors: NSAIDs  Previous Episodes: yes  Associated Symptoms: decreased ROM, decreased strength  ADLs Affected: dressing  Previous Treatment: NSAIDs and intra-articular injection       Objective   Vital Signs:   /82   Pulse 85     Physical Exam  Physical Exam  Vitals signs and nursing note reviewed.   Constitutional:       Appearance: Normal appearance.   Pulmonary:      Effort: Pulmonary effort is normal.   Skin:     General: Skin is warm and dry.      Capillary Refill: Capillary refill takes less than 2 seconds.   Neurological:      General: No focal deficit present.      Mental Status: He  is alert and oriented to person, place, and time. Mental status is at baseline.   Psychiatric:         Mood and Affect: Mood normal.         Behavior: Behavior normal.         Thought Content: Thought content normal.         Judgment: Judgment normal.     Ortho Exam   Right shoulder (rct). The shoulder is extremely tender anteriorly. There is tenderness over the insertion of the rotator cuff over the greater tuberosity of the humerus. Slight proximal migration of the humeral head is noted. AC joint is tender. Crossover adduction test is positive. Drop arm sign is positive. Forward flexion is 0-100 degrees, abduction is 0-100 degrees, external rotation is 0-30 degrees. Patient has mild atrophy both of the supra and infraspinatus fossa. Sulcus sign is negative. There is no evidence of multidirectional instability. Neer test is positive on compression. Skin and soft tissue tenderness is noted. Patient's strength in abduction and external rotation are extremely lacking. The pain level is 5.          Result Review :   The following data was reviewed by: Chet Raymundo MD on 02/02/2021:       no diagnostic testing performed this visit            Large Joint Arthrocentesis: R glenohumeral  Date/Time: 2/2/2021 12:29 PM  Consent given by: patient  Site marked: site marked  Timeout: Immediately prior to procedure a time out was called to verify the correct patient, procedure, equipment, support staff and site/side marked as required   Supporting Documentation  Indications: pain   Procedure Details  Location: shoulder - R glenohumeral  Preparation: Patient was prepped and draped in the usual sterile fashion  Needle size: 25 G  Approach: anteromedial  Medications administered: 2 mL lidocaine 1 %; 160 mg methylPREDNISolone acetate 80 MG/ML  Patient tolerance: patient tolerated the procedure well with no immediate complications                 Assessment   Assessment and Plan    Problem List Items Addressed This Visit        Other     Rotator cuff tear, non-traumatic, right - Primary          Follow Up   · Injected patient's right shoulder with a steroid from an anterolateral approach.  · Calcium and vitamin D for bone health.  · Discussed obtaining an x-ray of the shoulder at the patient's next visit with me in the office.  We have also discussed about following that up with an MRI if needed.  · Rest, ice, compression, and elevation (RICE) therapy  · Stretching and strengthening exercises of the flexors and abductors of the shoulder.  · OTC Tylenol 500-1000mg by mouth every 6 hours as needed for pain   · Follow up in 3 month(s)  • Patient was given instructions and counseling regarding his condition or for health maintenance advice. Please see specific information pulled into the AVS if appropriate.     Chet Raymundo MD   Date of Encounter: 2/2/2021   Electronically signed by Chet Raymundo MD, 02/02/21, 12:21 PM EST.     EMR Dragon/Transcription disclaimer:  Much of this encounter note is an electronic transcription/translation of spoken language to printed text. The electronic translation of spoken language may permit erroneous, or at times, nonsensical words or phrases to be inadvertently transcribed; Although I have reviewed the note for such errors, some may still exist.

## 2021-02-08 ENCOUNTER — LAB (OUTPATIENT)
Dept: LAB | Facility: HOSPITAL | Age: 81
End: 2021-02-08

## 2021-02-08 DIAGNOSIS — Z01.818 OTHER SPECIFIED PRE-OPERATIVE EXAMINATION: ICD-10-CM

## 2021-02-08 PROCEDURE — C9803 HOPD COVID-19 SPEC COLLECT: HCPCS

## 2021-02-08 PROCEDURE — U0004 COV-19 TEST NON-CDC HGH THRU: HCPCS

## 2021-02-09 LAB — SARS-COV-2 RNA RESP QL NAA+PROBE: NOT DETECTED

## 2021-02-10 ENCOUNTER — APPOINTMENT (OUTPATIENT)
Dept: SLEEP MEDICINE | Facility: HOSPITAL | Age: 81
End: 2021-02-10

## 2021-02-24 ENCOUNTER — IMMUNIZATION (OUTPATIENT)
Dept: VACCINE CLINIC | Facility: HOSPITAL | Age: 81
End: 2021-02-24

## 2021-02-24 PROCEDURE — 0001A: CPT | Performed by: INTERNAL MEDICINE

## 2021-02-24 PROCEDURE — 91300 HC SARSCOV02 VAC 30MCG/0.3ML IM: CPT | Performed by: INTERNAL MEDICINE

## 2021-03-03 ENCOUNTER — TELEPHONE (OUTPATIENT)
Dept: CARDIOLOGY | Facility: CLINIC | Age: 81
End: 2021-03-03

## 2021-03-03 NOTE — TELEPHONE ENCOUNTER
Pt needs clearance to hold ASA  325 MG for 6 days prior to an epidural injection @ [facility].    Thanks,  Loren TOVAR  06/05/20 was to f/u in 6 wks and cx next 2 appts.

## 2021-03-04 NOTE — TELEPHONE ENCOUNTER
Spoke to pt and called [facility] to let them know.  They would like a fax for their records.      Fax to:  Dr. Lieberman [phone]

## 2021-03-17 ENCOUNTER — IMMUNIZATION (OUTPATIENT)
Dept: VACCINE CLINIC | Facility: HOSPITAL | Age: 81
End: 2021-03-17

## 2021-03-17 PROCEDURE — 91300 HC SARSCOV02 VAC 30MCG/0.3ML IM: CPT | Performed by: INTERNAL MEDICINE

## 2021-03-17 PROCEDURE — 0002A: CPT | Performed by: INTERNAL MEDICINE

## 2021-05-04 ENCOUNTER — OFFICE VISIT (OUTPATIENT)
Dept: ORTHOPEDIC SURGERY | Facility: CLINIC | Age: 81
End: 2021-05-04

## 2021-05-04 VITALS — BODY MASS INDEX: 26.34 KG/M2 | WEIGHT: 184 LBS | HEIGHT: 70 IN

## 2021-05-04 DIAGNOSIS — M75.121 NONTRAUMATIC COMPLETE TEAR OF RIGHT ROTATOR CUFF: Primary | ICD-10-CM

## 2021-05-04 PROCEDURE — 20610 DRAIN/INJ JOINT/BURSA W/O US: CPT | Performed by: ORTHOPAEDIC SURGERY

## 2021-05-04 RX ORDER — LIDOCAINE HYDROCHLORIDE 10 MG/ML
2 INJECTION, SOLUTION INFILTRATION; PERINEURAL
Status: COMPLETED | OUTPATIENT
Start: 2021-05-04 | End: 2021-05-04

## 2021-05-04 RX ORDER — CHLORAL HYDRATE 500 MG
2 CAPSULE ORAL DAILY
COMMUNITY
End: 2021-11-01 | Stop reason: ALTCHOICE

## 2021-05-04 RX ORDER — METHYLPREDNISOLONE ACETATE 80 MG/ML
160 INJECTION, SUSPENSION INTRA-ARTICULAR; INTRALESIONAL; INTRAMUSCULAR; SOFT TISSUE
Status: COMPLETED | OUTPATIENT
Start: 2021-05-04 | End: 2021-05-04

## 2021-05-04 RX ADMIN — LIDOCAINE HYDROCHLORIDE 2 ML: 10 INJECTION, SOLUTION INFILTRATION; PERINEURAL at 11:43

## 2021-05-04 RX ADMIN — METHYLPREDNISOLONE ACETATE 160 MG: 80 INJECTION, SUSPENSION INTRA-ARTICULAR; INTRALESIONAL; INTRAMUSCULAR; SOFT TISSUE at 11:43

## 2021-05-04 NOTE — PROGRESS NOTES
INJECTION    Patient: Keith Hankins Jr.    YOB: 1940    MRN: [id number]    Chief Complaint   Patient presents with   • Right Shoulder - Follow-up       History of Present Illness:  Patient returns today for follow-up on right shoulder pain. His pain is generalized in the shoulder, has been progressive and remains intermittent . His pain is worsened by overhead reaching, reaching backward, reaching forward and improved with heat, NSAIDS and injections.    This problem is not new to this examiner.     Allergies: No Known Allergies    Medications:   Home Medications:  Current Outpatient Medications on File Prior to Visit   Medication Sig   • acetaminophen (TYLENOL) 650 MG 8 hr tablet Take 650 mg by mouth 2 (Two) Times a Day.   • acetaminophen-codeine (TYLENOL #3) 300-30 MG per tablet 1-2 pills PO Q6 hours as needed for pain   • allopurinol (ZYLOPRIM) 100 MG tablet Take 100 mg by mouth Daily.   • amLODIPine (NORVASC) 10 MG tablet Take 10 mg by mouth Every Night.   • amLODIPine (NORVASC) 5 MG tablet Take 5 mg by mouth Daily.   • aspirin (ECOTRIN) 325 MG EC tablet Take 325 mg by mouth Daily.   • atorvastatin (LIPITOR) 40 MG tablet Take 40 mg by mouth Daily.   • buPROPion XL (WELLBUTRIN XL) 150 MG 24 hr tablet Take 150 mg by mouth Every Morning.   • escitalopram (LEXAPRO) 10 MG tablet Take 10 mg by mouth Daily.   • metoprolol succinate XL (TOPROL-XL) 50 MG 24 hr tablet Take 25 mg by mouth Daily.   • Multiple Vitamins-Minerals (CENTRUM SILVER ULTRA MENS PO) Take 1 tablet by mouth Daily.   • OMEGA-3 KRILL OIL PO Take 1 capsule by mouth Daily.   • tamsulosin (FLOMAX) 0.4 MG capsule 24 hr capsule Take 1 capsule by mouth Daily.   • Testosterone Cypionate (DEPOTESTOTERONE CYPIONATE) 200 MG/ML injection TAKE 1 ML EVERY 2 WEEKS   • valsartan (Diovan) 320 MG tablet Take 0.5 tablets by mouth Daily.   • zolpidem (Ambien) 5 MG tablet Take 1 tablet by mouth At Night As Needed (Bring to the sleep lab. DO NOT Take at  "home).   • Omega-3 Fatty Acids (fish oil) 1000 MG capsule capsule Take 2 g by mouth Daily.     No current facility-administered medications on file prior to visit.     Current Medications:  Scheduled Meds:  PRN Meds:.    I have reviewed the patient's medical history in detail and updated the computerized patient record.  Review and summarization of old records include:    Past Medical History:   Diagnosis Date   • Atherosclerotic heart disease of native coronary artery without angina pectoris    • Benign essential hypertension    • CKD (chronic kidney disease), stage III (CMS/[facility])    • Degeneration of cervical intervertebral disc    • Depression with anxiety    • ED (erectile dysfunction)    • High cholesterol    • Hypertension    • MI (myocardial infarction) (CMS/[facility])    • Spinal stenosis    • Syncope      Past Surgical History:   Procedure Laterality Date   • BACK SURGERY     • CATARACT EXTRACTION Bilateral    • COLONOSCOPY N/A 2017    Procedure: COLONOSCOPY TO CECUM;  Surgeon: Emmanuel Peterson MD;  Location: [facility] END[facility]COPY;  Service:    • NECK SURGERY     • TONSILLECTOMY       Social History     Occupational History   • Not on file   Tobacco Use   • Smoking status: Former Smoker     Types: Cigarettes     Quit date:      Years since quittin.3   • Smokeless tobacco: Never Used   • Tobacco comment: daily caffeine use   Substance and Sexual Activity   • Alcohol use: Yes     Comment: occasional use   • Drug use: No   • Sexual activity: Defer      Social History     Social History Narrative   • Not on file     Family History   Problem Relation Age of Onset   • Cancer Mother         breast   • Stroke Mother    • Heart disease Father        Review of Systems        Wt Readings from Last 3 Encounters:   21 83.5 kg (184 lb)   21 83.5 kg (184 lb)   21 81.6 kg (180 lb)     Ht Readings from Last 3 Encounters:   21 177.8 cm (70\")   21 177.8 cm (70\")   21 177.8 cm (70\") "     Body mass index is 26.4 kg/m².  Facility age limit for growth percentiles is 20 years.  There were no vitals filed for this visit.    Physical Exam    Musculoskeletal:    Right shoulder (cta). Rotator cuff function is extremely impaired. There is a high riding humeral head with articulation of the humerus to the undersurface of the acromiohumeral articulation. AC joint is exquisitely tender and painful for patient. Axillary nerve function is well preserved. There is significant winging of the scapula with hollowing of the fossae over the proximal and distal aspects of the spine of the scapula. Forward flexion is 0-30 degrees, active abduction is 0-60 degrees. Drop arm sign is positive. External rotation against resistance is extremely painful and limited for the patient. Skin and soft tissues are essentially normal other than some amounts of swelling. The pain level is 5      Diagnostics:      Procedure:  Large Joint Arthrocentesis: R subacromial bursa  Date/Time: 5/4/2021 11:43 AM  Consent given by: patient  Site marked: site marked  Timeout: Immediately prior to procedure a time out was called to verify the correct patient, procedure, equipment, support staff and site/side marked as required   Supporting Documentation  Indications: pain   Procedure Details  Location: shoulder - R subacromial bursa  Preparation: Patient was prepped and draped in the usual sterile fashion  Needle size: 25 G  Approach: anteromedial  Medications administered: 2 mL lidocaine 1 %; 160 mg methylPREDNISolone acetate 80 MG/ML  Patient tolerance: patient tolerated the procedure well with no immediate complications            Assessment:   Diagnoses and all orders for this visit:    1. Nontraumatic complete tear of right rotator cuff (Primary)    Other orders  -     Large Joint Arthrocentesis: R subacromial bursa          Plan:   · Injected patient's right shoulder joint(s)with Depo-Medrol from an anteromedial approach.   · Rest, ice,  compression, and elevation (RICE) therapy  · OTC Tylenol 500-1000mg by mouth every 6 hours as needed for pain   · Follow up in 3 month(s).  · We will get some x-rays of the shoulder upon return so that we can give this patient a better idea about the condition of his shoulder and if he is going to need a reverse total shoulder arthroplasty anytime soon.    Date of encounter: 05/04/2021   Chet Raymundo MD

## 2021-06-28 RX ORDER — VALSARTAN 320 MG/1
TABLET ORAL
Qty: 45 TABLET | Refills: 3 | Status: SHIPPED | OUTPATIENT
Start: 2021-06-28 | End: 2021-11-10

## 2021-08-03 ENCOUNTER — OFFICE VISIT (OUTPATIENT)
Dept: ORTHOPEDIC SURGERY | Facility: CLINIC | Age: 81
End: 2021-08-03

## 2021-08-03 DIAGNOSIS — M54.12 CERVICAL RADICULITIS: ICD-10-CM

## 2021-08-03 DIAGNOSIS — M25.511 RIGHT SHOULDER PAIN, UNSPECIFIED CHRONICITY: Primary | ICD-10-CM

## 2021-08-03 PROCEDURE — 20610 DRAIN/INJ JOINT/BURSA W/O US: CPT | Performed by: ORTHOPAEDIC SURGERY

## 2021-08-03 PROCEDURE — 99213 OFFICE O/P EST LOW 20 MIN: CPT | Performed by: ORTHOPAEDIC SURGERY

## 2021-08-03 RX ORDER — LIDOCAINE HYDROCHLORIDE 10 MG/ML
2 INJECTION, SOLUTION INFILTRATION; PERINEURAL
Status: COMPLETED | OUTPATIENT
Start: 2021-08-03 | End: 2021-08-03

## 2021-08-03 RX ORDER — METHYLPREDNISOLONE ACETATE 80 MG/ML
160 INJECTION, SUSPENSION INTRA-ARTICULAR; INTRALESIONAL; INTRAMUSCULAR; SOFT TISSUE
Status: COMPLETED | OUTPATIENT
Start: 2021-08-03 | End: 2021-08-03

## 2021-08-03 RX ADMIN — LIDOCAINE HYDROCHLORIDE 2 ML: 10 INJECTION, SOLUTION INFILTRATION; PERINEURAL at 10:34

## 2021-08-03 RX ADMIN — METHYLPREDNISOLONE ACETATE 160 MG: 80 INJECTION, SUSPENSION INTRA-ARTICULAR; INTRALESIONAL; INTRAMUSCULAR; SOFT TISSUE at 10:34

## 2021-08-03 NOTE — PROGRESS NOTES
Chief Complaint  Follow-up of the Right Shoulder    Subjective    History of Present Illness      Keith Hankins Jr. is a 81 y.o. male who presents to [facility] for pain and discomfort in the cervical spine and for right shoulder pain.  History of Present Illness     The patient presents to the office with increasing pain and discomfort of the right shoulder. He has some weakness in abduction. There are certain activities that he cannot perform without pain or discomfort. He denies any recent history of trauma. He states that he has had a previous MRI which has been consistent with a rotator cuff tear. The patient has also complained of cervical spine pain and discomfort. This pain starts at the base of the cervical spine and radiates to the right shoulder blade. There have been some episodes of pain and discomfort, which have definitely improved and gotten better. He has his historically had a cervical spine fusion performed by Dr. Jadiel Mackay, neurosurgeon. He was recently sent for MICHAEL at the pain clinic and seemed to benefit from those interventions as well. At this point, he is asking me if there is another spinal specialist that he could visit and I have recommended Dr. Adelfo Carroll.     Pain Location: Cervical spine with radiation to the right shoulder   Radiation: yes  Quality: sharp, stabbing  Intensity/Severity: moderate to severe  Duration: several months. Cervical spine pain has been worse over the past few weeks.  Progression of symptoms: yes, progressive worsening  Onset quality: gradual   Timing: intermittent  Aggravating Factors:  going into the overhead abducted position with repetitive motion.   Alleviating Factors: NSAIDs, ice  Previous Episodes: yes  Associated Symptoms: swelling, clicking/popping  ADLs Affected: no  Previous Treatment: intra-articular injection of steroid      Objective   Vital Signs:   There were no vitals taken for this visit.    Physical  Exam  Physical Exam  Vitals signs and nursing note reviewed.   Constitutional:       Appearance: Normal appearance.   Pulmonary:      Effort: Pulmonary effort is normal.   Skin:     General: Skin is warm and dry.      Capillary Refill: Capillary refill takes less than 2 seconds.   Neurological:      General: No focal deficit present.      Mental Status: He is alert and oriented to person, place, and time. Mental status is at baseline.   Psychiatric:         Mood and Affect: Mood normal.         Behavior: Behavior normal.         Thought Content: Thought content normal.         Judgment: Judgment normal.     Ortho Exam   Right shoulder (cta). Rotator cuff function is extremely impaired. There is a high riding humeral head with articulation of the humerus to the undersurface of the acromiohumeral articulation. AC joint is exquisitely tender and painful for patient. Axillary nerve function is well preserved. There is significant winging of the scapula with hollowing of the fossae over the proximal and distal aspects of the spine of the scapula. Forward flexion is 0-30 degrees, active abduction is 0-60 degrees. Drop arm sign is positive. External rotation against resistance is extremely painful and limited for the patient. Skin and soft tissues are essentially normal other than some amounts of swelling. The pain level is 5    Cervical Spine: Skin and soft tissues are mildly swollen. Deep tendon reflexes bilateral, symmetric and equal on both upper and lower extremities. Cough impulse is positive and invokes pain for the patient. No objective motor or sensory function deficit is present. No long tract signs re evident clinically.There is no evidence of myelopathy. No bowel or bladder deficit. Mild spasm of erector spinae muscle is present.Lateral rotations of the spine are limited in range of motion and painful for the patient. No evidence of long tract signs is noted.  bilaterally Sided lateral rotation is associated  with pain and discomfort.    Result Review :   The following data was reviewed by: Chet Raymundo MD on 08/03/2021:  Radiologic studies - see below for interpretation  right shoulder xrays ap/lateralviews were performed at [facility] on 08/03/2021. These images were independently viewed and interpreted by myself, my impression as follows:    right Shoulder X-Ray  Indication:Pain and discomfort with limited range of motion of the shoulder.  AP and Lateral  Findings: Sclerosis over the greater tuberosity of the humerus, but otherwise a fairly well preserved contour of the shoulder and glenohumeral joint.   no bony lesion  Soft tissues within normal limits  within normal limits joint spaces  Hardware appropriately positioned not applicable      yes prior studies available for comparison.    X-RAY was ordered and reviewed by Chet Raymundo MD            Large Joint Arthrocentesis: R subacromial bursa  Date/Time: 8/3/2021 10:34 AM  Consent given by: patient  Site marked: site marked  Timeout: Immediately prior to procedure a time out was called to verify the correct patient, procedure, equipment, support staff and site/side marked as required   Supporting Documentation  Indications: pain   Procedure Details  Location: shoulder - R subacromial bursa  Preparation: Patient was prepped and draped in the usual sterile fashion  Needle size: 25 G  Approach: anteromedial  Medications administered: 2 mL lidocaine 1 %; 160 mg methylPREDNISolone acetate 80 MG/ML  Patient tolerance: patient tolerated the procedure well with no immediate complications                 Assessment   Assessment and Plan    Diagnoses and all orders for this visit:    1. Right shoulder pain, unspecified chronicity (Primary)  -     Large Joint Arthrocentesis: R subacromial bursa  -     XR Shoulder 2+ View Right    2. Cervical radiculitis          Follow Up   ·   · Rest, ice, compression, and elevation (RICE) therapy  · Stretching and strengthening  exercises  · OTC Tylenol 500-1000mg by mouth every 6 hours as needed for pain   · Follow up in 3 month(s)  • Patient was given instructions and counseling regarding his condition or for health maintenance advice. Please see specific information pulled into the AVS if appropriate.   • Injected patient's right shoulder with steroid from an anterolateral approach.   • Calcium and vitamin D for bone health.    • Ice to the shoulder.   • I would recommend an appointment with a neurosurgical colleague to make sure that the cervical spine is not unduly symptomatic and he does not require an MRI or consideration for surgical decompression and possible fusion at a different level than the C3-4, C4-5, and the C5-6 levels that have been previously fused.   • Nonoperative management of the shoulder pathology at this point. He may eventually need reverse total shoulder arthroplasty, but we are going to continue to manage him with conservative nonoperative management including episodic and periodic injections of steroid to the shoulder.    Chet Raymundo MD   Date of Encounter: 8/3/2021       EMR Dragon/Transcription disclaimer:  Much of this encounter note is an electronic transcription/translation of spoken language to printed text. The electronic translation of spoken language may permit erroneous, or at times, nonsensical words or phrases to be inadvertently transcribed; Although I have reviewed the note for such errors, some may still exist.     Transcribed from ambient dictation for Chet Raymundo MD by Sarah Ruano.  08/03/21   13:31 EDT    I have personally performed the services described in this document as transcribed by the above individual, and it is both accurate and complete.  Chet Raymundo MD  8/3/2021  15:09 EDT

## 2021-11-01 ENCOUNTER — OFFICE VISIT (OUTPATIENT)
Dept: CARDIOLOGY | Facility: CLINIC | Age: 81
End: 2021-11-01

## 2021-11-01 VITALS
HEIGHT: 69 IN | BODY MASS INDEX: 27.99 KG/M2 | SYSTOLIC BLOOD PRESSURE: 160 MMHG | DIASTOLIC BLOOD PRESSURE: 80 MMHG | HEART RATE: 74 BPM | WEIGHT: 189 LBS

## 2021-11-01 DIAGNOSIS — I25.10 CORONARY ARTERY DISEASE INVOLVING NATIVE CORONARY ARTERY OF NATIVE HEART WITHOUT ANGINA PECTORIS: Primary | ICD-10-CM

## 2021-11-01 DIAGNOSIS — I10 ESSENTIAL HYPERTENSION: ICD-10-CM

## 2021-11-01 PROCEDURE — 93000 ELECTROCARDIOGRAM COMPLETE: CPT | Performed by: INTERNAL MEDICINE

## 2021-11-01 PROCEDURE — 99214 OFFICE O/P EST MOD 30 MIN: CPT | Performed by: INTERNAL MEDICINE

## 2021-11-01 RX ORDER — FUROSEMIDE 20 MG/1
20 TABLET ORAL DAILY
COMMUNITY

## 2021-11-01 NOTE — PROGRESS NOTES
"      CARDIOLOGY    Sean Thompson MD    ENCOUNTER DATE:  11/01/2021    Keith Hankins Jr. / 81 y.o. / male        CHIEF COMPLAINT / REASON FOR OFFICE VISIT     Coronary Artery Disease (06/05/2020 Follow up)  Hypertension    HISTORY OF PRESENT ILLNESS       HPI  Keith Hankins Jr. is a 81 y.o. male who presents today for reevaluation.  Currently he says he is doing very very good.  He is not having any types of symptoms no chest pain shortness of breath palpitations lightheadedness or fatigue.  He did say about 2 weeks ago his ankles started swelling terribly.  His primary care provider placed him on Lasix and with that pretty much resolved.  He denies any types of chest discomfort and otherwise has no complaints.      The following portions of the patient's history were reviewed and updated as appropriate: allergies, current medications, past family history, past medical history, past social history, past surgical history and problem list.      VITAL SIGNS     Visit Vitals  /80 (BP Location: Left arm)   Pulse 74   Ht 175.3 cm (69\")   Wt 85.7 kg (189 lb)   BMI 27.91 kg/m²         Wt Readings from Last 3 Encounters:   11/01/21 85.7 kg (189 lb)   09/10/21 81.6 kg (180 lb)   05/04/21 83.5 kg (184 lb)     Body mass index is 27.91 kg/m².      REVIEW OF SYSTEMS   Review of Systems   All other systems reviewed and are negative.          PHYSICAL EXAMINATION     Vitals reviewed.   Constitutional:       Appearance: Healthy appearance.   Pulmonary:      Effort: Pulmonary effort is normal.   Cardiovascular:      Normal rate. Regular rhythm. Normal S1. Normal S2.      Murmurs: There is no murmur.      No gallop. No click. No rub.   Pulses:     Intact distal pulses.   Edema:     Peripheral edema absent.   Neurological:      Mental Status: Alert and oriented to person, place and time.           REVIEWED DATA       ECG 12 Lead    Date/Time: 11/1/2021 10:28 AM  Performed by: Sean Thompson MD  Authorized by: " Sean Thompson MD   Comparison: compared with previous ECG from 6/5/2020  Comparison to previous ECG: Appears his LAFB is now complete which is new for him.  Rhythm: sinus rhythm  Conduction: left anterior fascicular block    Clinical impression: abnormal EKG            Cardiac Procedures:  Interpretation Summary  6/11/2020    · Left ventricular systolic function is hyperdynamic (EF > 70%). Calculated EF = 71.8%. Estimated EF was in agreement with the calculated EF. Estimated EF = 72%. Normal left ventricular cavity size noted. All left ventricular wall segments contract normally. Left ventricular wall thickness is consistent with mild concentric hypertrophy. Left ventricular diastolic dysfunction is noted (grade I a w/high LAP) consistent with impaired relaxation.  · Trace tricuspid valve regurgitation is present. Insufficient TR velocity profile to estimate the right ventricular systolic pressure. RV systolic pressure is at least 35 mmHg assuming an RA pressure of 3 mmHg. .  · Normal stress echo with no significant echocardiographic evidence for myocardial ischemia.             ASSESSMENT & PLAN      Diagnosis Plan   1. Coronary artery disease involving native coronary artery of native heart without angina pectoris  ECG 12 Lead   2. Essential hypertension  ECG 12 Lead         SUMMARY/DISCUSSION  1. Coronary artery disease.  Patient remains stable from a cardiovascular standpoint.  His ECG he has developed a left anterior fascicular block.  His previous ECG was hinting that way but he never completed it which appears he did now.  He remains asymptomatic.  I am concerned about the swelling that he had several weeks ago however they resolved currently his ECG does not show ischemia clinically he is doing good.  I think I would just follow for now he does admit that salt is a huge issue is gone to start watching that.  2. Hypertension blood pressure is high today again the salt intake is a huge concern.  I did  tell him to go on his valsartan at 320 mW take a whole pill he was taking half a pill.  He is to follow his blood pressure follow-up in several months and go from there.        MEDICATIONS         Discharge Medications          Accurate as of November 1, 2021 10:31 AM. If you have any questions, ask your nurse or doctor.            Continue These Medications      Instructions Start Date   acetaminophen 650 MG 8 hr tablet  Commonly known as: TYLENOL   650 mg, Oral, 2 Times Daily      allopurinol 100 MG tablet  Commonly known as: ZYLOPRIM   100 mg, Oral, Daily      amLODIPine 5 MG tablet  Commonly known as: NORVASC   5 mg, Oral, Daily      atorvastatin 40 MG tablet  Commonly known as: LIPITOR   40 mg, Oral, Daily      buPROPion  MG 24 hr tablet  Commonly known as: WELLBUTRIN XL   150 mg, Oral, Every Morning      Ecotrin 325 MG tablet  Generic drug: aspirin EC   325 mg, Oral, Daily      escitalopram 10 MG tablet  Commonly known as: LEXAPRO   10 mg, Oral, Daily      furosemide 20 MG tablet  Commonly known as: LASIX   20 mg, Oral, Daily      multivitamin with minerals tablet tablet   1 tablet, Oral, Daily      tamsulosin 0.4 MG capsule 24 hr capsule  Commonly known as: FLOMAX   1 capsule, Oral, Daily      Testosterone Cypionate 200 MG/ML injection  Commonly known as: DEPOTESTOTERONE CYPIONATE   TAKE 1 ML EVERY 2 WEEKS      valsartan 320 MG tablet  Commonly known as: DIOVAN   TAKE 1/2 TABLET BY MOUTH EVERY DAY                 **Dragon Disclaimer:   Much of this encounter note is an electronic transcription/translation of spoken language to printed text. The electronic translation of spoken language may permit erroneous, or at times, nonsensical words or phrases to be inadvertently transcribed. Although I have reviewed the note for such errors, some may still exist.

## 2021-11-03 ENCOUNTER — IMMUNIZATION (OUTPATIENT)
Dept: VACCINE CLINIC | Facility: HOSPITAL | Age: 81
End: 2021-11-03

## 2021-11-03 PROCEDURE — 0004A ADM SARSCOV2 30MCG/0.3ML BOOSTER: CPT | Performed by: INTERNAL MEDICINE

## 2021-11-03 PROCEDURE — 91300 HC SARSCOV02 VAC 30MCG/0.3ML IM: CPT | Performed by: INTERNAL MEDICINE

## 2021-11-10 ENCOUNTER — OFFICE VISIT (OUTPATIENT)
Dept: ORTHOPEDIC SURGERY | Facility: CLINIC | Age: 81
End: 2021-11-10

## 2021-11-10 DIAGNOSIS — M75.121 NONTRAUMATIC COMPLETE TEAR OF RIGHT ROTATOR CUFF: Primary | ICD-10-CM

## 2021-11-10 DIAGNOSIS — M75.101 ROTATOR CUFF TEAR, NON-TRAUMATIC, RIGHT: ICD-10-CM

## 2021-11-10 PROCEDURE — 20610 DRAIN/INJ JOINT/BURSA W/O US: CPT | Performed by: ORTHOPAEDIC SURGERY

## 2021-11-10 RX ORDER — VALSARTAN 320 MG/1
320 TABLET ORAL DAILY
COMMUNITY
End: 2022-01-03 | Stop reason: SDUPTHER

## 2021-11-10 RX ORDER — METHYLPREDNISOLONE ACETATE 80 MG/ML
160 INJECTION, SUSPENSION INTRA-ARTICULAR; INTRALESIONAL; INTRAMUSCULAR; SOFT TISSUE
Status: COMPLETED | OUTPATIENT
Start: 2021-11-10 | End: 2021-11-10

## 2021-11-10 RX ADMIN — METHYLPREDNISOLONE ACETATE 160 MG: 80 INJECTION, SUSPENSION INTRA-ARTICULAR; INTRALESIONAL; INTRAMUSCULAR; SOFT TISSUE at 08:19

## 2021-11-10 NOTE — PROGRESS NOTES
INJECTION    Patient: Keith Hankins Jr.    YOB: 1940    MRN: [id number]    Chief Complaint   Patient presents with   • Right Shoulder - Follow-up       History of Present Illness:  Patient returns today for follow-up on right shoulder pain. His pain is generalized in the shoulder, has been progressive and remains intermittent . His pain is worsened by overhead reaching and improved with injections.    This problem is not new to this examiner.     Allergies: No Known Allergies    Medications:   Home Medications:  Current Outpatient Medications on File Prior to Visit   Medication Sig   • acetaminophen (TYLENOL) 650 MG 8 hr tablet Take 650 mg by mouth 2 (Two) Times a Day.   • allopurinol (ZYLOPRIM) 100 MG tablet Take 100 mg by mouth Daily.   • amLODIPine (NORVASC) 5 MG tablet Take 5 mg by mouth Daily.   • aspirin (ECOTRIN) 325 MG EC tablet Take 325 mg by mouth Daily.   • atorvastatin (LIPITOR) 40 MG tablet Take 40 mg by mouth Daily.   • buPROPion XL (WELLBUTRIN XL) 150 MG 24 hr tablet Take 150 mg by mouth Every Morning.   • escitalopram (LEXAPRO) 10 MG tablet Take 10 mg by mouth Daily.   • furosemide (LASIX) 20 MG tablet Take 20 mg by mouth Daily.   • Multiple Vitamins-Minerals (CENTRUM SILVER ULTRA MENS PO) Take 1 tablet by mouth Daily.   • tamsulosin (FLOMAX) 0.4 MG capsule 24 hr capsule Take 1 capsule by mouth Daily.   • Testosterone Cypionate (DEPOTESTOTERONE CYPIONATE) 200 MG/ML injection TAKE 1 ML EVERY 2 WEEKS   • valsartan (DIOVAN) 320 MG tablet Take 320 mg by mouth Daily.     No current facility-administered medications on file prior to visit.     Current Medications:  Scheduled Meds:  PRN Meds:.    I have reviewed the patient's medical history in detail and updated the computerized patient record.  Review and summarization of old records include:    Past Medical History:   Diagnosis Date   • Atherosclerotic heart disease of native coronary artery without angina pectoris    • Benign essential  "hypertension    • CKD (chronic kidney disease), stage III ([facility])    • Degeneration of cervical intervertebral disc    • Depression with anxiety    • ED (erectile dysfunction)    • High cholesterol    • Hypertension    • MI (myocardial infarction) ([facility])    • Spinal stenosis    • Syncope      Past Surgical History:   Procedure Laterality Date   • BACK SURGERY     • CATARACT EXTRACTION Bilateral    • COLONOSCOPY N/A 2017    Procedure: COLONOSCOPY TO CECUM;  Surgeon: Emmanuel Peterson MD;  Location: [facility] ENDOSCOPY;  Service:    • NECK SURGERY     • TONSILLECTOMY       Social History     Occupational History   • Not on file   Tobacco Use   • Smoking status: Former Smoker     Types: Cigarettes     Quit date:      Years since quittin.9   • Smokeless tobacco: Never Used   • Tobacco comment: daily caffeine use   Substance and Sexual Activity   • Alcohol use: Yes     Comment: occasional use   • Drug use: No   • Sexual activity: Defer      Social History     Social History Narrative   • Not on file     Family History   Problem Relation Age of Onset   • Cancer Mother         breast   • Stroke Mother    • Heart disease Father        Review of Systems        Wt Readings from Last 3 Encounters:   21 85.7 kg (189 lb)   09/10/21 81.6 kg (180 lb)   21 83.5 kg (184 lb)     Ht Readings from Last 3 Encounters:   21 175.3 cm (69\")   09/10/21 175.3 cm (69\")   21 177.8 cm (70\")     There is no height or weight on file to calculate BMI.  No height and weight on file for this encounter.  There were no vitals filed for this visit.    Physical Exam    Musculoskeletal:    Right shoulder (cta). Rotator cuff function is extremely impaired. There is a high riding humeral head with articulation of the humerus to the undersurface of the acromiohumeral articulation. AC joint is exquisitely tender and painful for patient. Axillary nerve function is well preserved. There is significant winging of the scapula with " hollowing of the fossae over the proximal and distal aspects of the spine of the scapula. Forward flexion is 0-30 degrees, active abduction is 0-60 degrees. Drop arm sign is positive. External rotation against resistance is extremely painful and limited for the patient. Skin and soft tissues are essentially normal other than some amounts of swelling. The pain level is 4      Diagnostics:      Procedure:  Large Joint Arthrocentesis: R subacromial bursa  Date/Time: 11/10/2021 8:19 AM  Consent given by: patient  Site marked: site marked  Timeout: Immediately prior to procedure a time out was called to verify the correct patient, procedure, equipment, support staff and site/side marked as required   Supporting Documentation  Indications: pain   Procedure Details  Location: shoulder - R subacromial bursa  Preparation: Patient was prepped and draped in the usual sterile fashion  Needle size: 25 G  Approach: anteromedial  Medications administered: 160 mg methylPREDNISolone acetate 80 MG/ML; 2 mL lidocaine (cardiac)  Patient tolerance: patient tolerated the procedure well with no immediate complications            Assessment:   Diagnoses and all orders for this visit:    1. Nontraumatic complete tear of right rotator cuff (Primary)  -     Large Joint Arthrocentesis: R subacromial bursa  -     methylPREDNISolone acetate (DEPO-medrol) injection 160 mg  -     lidocaine (cardiac) (XYLOCAINE) injection 2 mL    2. Rotator cuff tear, non-traumatic, right          Plan:   · Injected patient's right shoulder joint(s)with Depo-Medrol from an anterolateral approach   · Rest, ice, compression, and elevation (RICE) therapy  · OTC Alternate Ibuprofen and Tylenol as needed  · Follow up in 3 month(s)    Date of encounter: 11/10/2021   Chet Raymundo MD

## 2022-01-03 ENCOUNTER — OFFICE VISIT (OUTPATIENT)
Dept: CARDIOLOGY | Facility: CLINIC | Age: 82
End: 2022-01-03

## 2022-01-03 VITALS
DIASTOLIC BLOOD PRESSURE: 78 MMHG | HEIGHT: 69 IN | BODY MASS INDEX: 27.85 KG/M2 | HEART RATE: 78 BPM | WEIGHT: 188 LBS | SYSTOLIC BLOOD PRESSURE: 148 MMHG | OXYGEN SATURATION: 98 % | RESPIRATION RATE: 16 BRPM

## 2022-01-03 DIAGNOSIS — I25.10 CORONARY ARTERY DISEASE INVOLVING NATIVE CORONARY ARTERY OF NATIVE HEART WITHOUT ANGINA PECTORIS: Primary | ICD-10-CM

## 2022-01-03 DIAGNOSIS — I10 ESSENTIAL HYPERTENSION: ICD-10-CM

## 2022-01-03 PROCEDURE — 99213 OFFICE O/P EST LOW 20 MIN: CPT | Performed by: NURSE PRACTITIONER

## 2022-01-03 RX ORDER — ERYTHROMYCIN 5 MG/G
OINTMENT OPHTHALMIC
COMMUNITY
End: 2022-07-12

## 2022-01-03 RX ORDER — KETOCONAZOLE 20 MG/G
CREAM TOPICAL
COMMUNITY

## 2022-01-03 RX ORDER — VALSARTAN 320 MG/1
320 TABLET ORAL DAILY
Qty: 90 TABLET | Refills: 3 | Status: SHIPPED | OUTPATIENT
Start: 2022-01-03 | End: 2022-12-15

## 2022-01-03 NOTE — PROGRESS NOTES
"    CARDIOLOGY        Patient Name: Keith Hankins Jr.  :1940  Age: 81 y.o.  Primary Cardiologist: Sean Thompson MD  Encounter Provider:  CODEY Fields    Date of Service: 22          CHIEF COMPLAINT / REASON FOR OFFICE VISIT     Coronary Artery Disease and Med Dose Change (VALSARTAN. WANTS TO KNOW IF HE IS TO TAKE 1/2 OR WHOLE TAB)      HISTORY OF PRESENT ILLNESS       HPI  Keith Hankins Jr. is a 81 y.o. male who presents today for 2-month reevaluation.     Pt has a  history significant for CAD, LAFB, hypertension.    Patient was evaluated by Dr. Thompson in 2021 where his blood pressure was elevated.  At that time he was advised to increase his losartan to 320 mg/day.    Patient reports that he has increased valsartan but has not monitored his blood pressure at home.  He does notes that when he last saw Dr. Thompson he had been experiencing some lower extremity edema he determined that he had stopped his furosemide at the recommendation of his PCP, he is now back on furosemide and lower extremity edema has completely resolved.  Patient states that he is feeling well and he denies chest pain, dyspnea, palpitations, lightheadedness, fatigue.      The following portions of the patient's history were reviewed and updated as appropriate: allergies, current medications, past family history, past medical history, past social history, past surgical history and problem list.      VITAL SIGNS     Visit Vitals  /78 (BP Location: Left arm, Patient Position: Sitting, Cuff Size: Adult)   Pulse 78   Resp 16   Ht 175.3 cm (69.02\")   Wt 85.3 kg (188 lb)   SpO2 98%   BMI 27.75 kg/m²         Wt Readings from Last 3 Encounters:   22 85.3 kg (188 lb)   21 85.7 kg (189 lb)   09/10/21 81.6 kg (180 lb)     Body mass index is 27.75 kg/m².      REVIEW OF SYSTEMS   Review of Systems   Constitutional: Negative for malaise/fatigue.   Cardiovascular: Negative for chest pain and leg swelling. "   Respiratory: Negative for shortness of breath.    Neurological: Negative for light-headedness.   All other systems reviewed and are negative.          PHYSICAL EXAMINATION     Constitutional:       Appearance: Normal appearance. Well-developed.   Eyes:      Conjunctiva/sclera: Conjunctivae normal.   Neck:      Vascular: No carotid bruit.   Pulmonary:      Effort: Pulmonary effort is normal.      Breath sounds: Normal breath sounds.   Cardiovascular:      Normal rate. Regular rhythm. Normal S1. Normal S2.      Murmurs: There is no murmur.      No gallop. No click. No rub.   Edema:     Peripheral edema absent.   Musculoskeletal: Normal range of motion. Skin:     General: Skin is warm and dry.   Neurological:      Mental Status: Alert and oriented to person, place, and time.      GCS: GCS eye subscore is 4. GCS verbal subscore is 5. GCS motor subscore is 6.   Psychiatric:         Speech: Speech normal.         Behavior: Behavior normal.         Thought Content: Thought content normal.         Judgment: Judgment normal.           REVIEWED DATA     Procedures    Cardiac Procedures:  1. Stress echocardiogram 6/11/2020.  LVEF 71%.  All LV wall segments contract normally.  Grade 1 diastolic dysfunction.  Trace tricuspid valve regurgitation.  Normal stress echocardiogram without significant evidence for myocardial ischemia.      BUN   Date Value Ref Range Status   10/01/2017 25 (H) 8 - 23 mg/dL Final     Creatinine   Date Value Ref Range Status   10/01/2017 1.30 (H) 0.76 - 1.27 mg/dL Final     Potassium   Date Value Ref Range Status   10/01/2017 4.8 3.5 - 5.2 mmol/L Final     ALT (SGPT)   Date Value Ref Range Status   10/01/2017 25 1 - 41 U/L Final     AST (SGOT)   Date Value Ref Range Status   10/01/2017 21 1 - 40 U/L Final           ASSESSMENT & PLAN      Diagnosis Plan   1. Coronary artery disease involving native coronary artery of native heart without angina pectoris     2. Essential hypertension            SUMMARY/DISCUSSION  1. CAD.  Currently denies angina or dyspnea.  Continue GDMT with amlodipine, aspirin, atorvastatin, valsartan.  2. Hypertension.  Blood pressure more controlled at 148/78.  Continue amlodipine 5 mg/day, furosemide 20 mg/day, valsartan 320 mg/day.  Refills provided.  3. Follow-up with Dr. Thompson in 6 months.  Sooner for problems or complications.        MEDICATIONS         Discharge Medications          Accurate as of January 3, 2022  1:07 PM. If you have any questions, ask your nurse or doctor.            Continue These Medications      Instructions Start Date   acetaminophen 650 MG 8 hr tablet  Commonly known as: TYLENOL   650 mg, Oral, 2 Times Daily      allopurinol 100 MG tablet  Commonly known as: ZYLOPRIM   100 mg, Oral, Daily      amLODIPine 5 MG tablet  Commonly known as: NORVASC   5 mg, Oral, Daily      atorvastatin 40 MG tablet  Commonly known as: LIPITOR   40 mg, Oral, Daily      buPROPion  MG 24 hr tablet  Commonly known as: WELLBUTRIN XL   150 mg, Oral, Every Morning      Ecotrin 325 MG tablet  Generic drug: aspirin EC   325 mg, Oral, Daily      erythromycin 5 MG/GM ophthalmic ointment  Commonly known as: ROMYCIN   erythromycin 5 mg/gram (0.5 %) eye ointment      escitalopram 10 MG tablet  Commonly known as: LEXAPRO   10 mg, Oral, Daily      furosemide 20 MG tablet  Commonly known as: LASIX   20 mg, Oral, Daily      hydrocortisone 2.5 % cream   hydrocortisone 2.5 % topical cream      ketoconazole 2 % cream  Commonly known as: NIZORAL   ketoconazole 2 % topical cream      multivitamin with minerals tablet tablet   1 tablet, Oral, Daily      tamsulosin 0.4 MG capsule 24 hr capsule  Commonly known as: FLOMAX   1 capsule, Oral, Daily      Testosterone Cypionate 200 MG/ML injection  Commonly known as: DEPOTESTOTERONE CYPIONATE   TAKE 1 ML EVERY 2 WEEKS      valsartan 320 MG tablet  Commonly known as: DIOVAN   320 mg, Oral, Daily                 **Dragon Disclaimer:   Much of  this encounter note is an electronic transcription/translation of spoken language to printed text. The electronic translation of spoken language may permit erroneous, or at times, nonsensical words or phrases to be inadvertently transcribed. Although I have reviewed the note for such errors, some may still exist.

## 2022-02-07 ENCOUNTER — TELEPHONE (OUTPATIENT)
Dept: ORTHOPEDIC SURGERY | Facility: CLINIC | Age: 82
End: 2022-02-07

## 2022-02-07 DIAGNOSIS — M25.512 ACUTE PAIN OF LEFT SHOULDER: Primary | ICD-10-CM

## 2022-02-07 NOTE — TELEPHONE ENCOUNTER
I CALLED AND LEFT A VOICEMAIL THAT I HAVE ADDED THE LEFT SHOULD PAIN TO HIS APPOINTMENT AND HE WILL NEED TO GET AN XR BEFORE HIS APPOINTMENT

## 2022-02-07 NOTE — TELEPHONE ENCOUNTER
Caller: EVELIO REGAN    Relationship to patient: PATIENT    Best call back number: [phone]    Chief complaint: LEFT SHOULDER PAIN     Type of visit: NEW PROBLEM     Requested date: 3/9/22    If rescheduling, when is the original appointment:     Additional notes: PATIENT WAS LIFTING WEIGHTS AND THINKS HE HAS DONE SOMETHING TO HIS LEFT SHOULDER    HE WANTS TO KNOW IF AT HIS APPT FOR HIS RIGHT SHOULDER ON 3/9/22 WILL DR AGUIRRE LOOK AT HIS LEFT SHOULDER OR WILL HE NEED ANOTHER APPT - PLEASE ADVISE TY!

## 2022-03-09 ENCOUNTER — OFFICE VISIT (OUTPATIENT)
Dept: ORTHOPEDIC SURGERY | Facility: CLINIC | Age: 82
End: 2022-03-09

## 2022-03-09 DIAGNOSIS — M75.121 NONTRAUMATIC COMPLETE TEAR OF RIGHT ROTATOR CUFF: ICD-10-CM

## 2022-03-09 DIAGNOSIS — M25.512 ACUTE PAIN OF LEFT SHOULDER: Primary | ICD-10-CM

## 2022-03-09 PROCEDURE — 99213 OFFICE O/P EST LOW 20 MIN: CPT | Performed by: ORTHOPAEDIC SURGERY

## 2022-03-09 PROCEDURE — 20610 DRAIN/INJ JOINT/BURSA W/O US: CPT | Performed by: ORTHOPAEDIC SURGERY

## 2022-03-09 RX ADMIN — LIDOCAINE HYDROCHLORIDE 2 ML: 10 INJECTION, SOLUTION EPIDURAL; INFILTRATION; INTRACAUDAL; PERINEURAL at 13:54

## 2022-03-09 RX ADMIN — TRIAMCINOLONE ACETONIDE 80 MG: 40 INJECTION, SUSPENSION INTRA-ARTICULAR; INTRAMUSCULAR at 13:54

## 2022-03-09 RX ADMIN — TRIAMCINOLONE ACETONIDE 80 MG: 40 INJECTION, SUSPENSION INTRA-ARTICULAR; INTRAMUSCULAR at 13:55

## 2022-03-09 RX ADMIN — LIDOCAINE HYDROCHLORIDE 2 ML: 10 INJECTION, SOLUTION EPIDURAL; INFILTRATION; INTRACAUDAL; PERINEURAL at 13:55

## 2022-03-09 NOTE — PROGRESS NOTES
Chief Complaint  Follow-up of the Right Shoulder and Pain of the Left Shoulder    Subjective    History of Present Illness      Keith Hankins Jr. is a 81 y.o. male who presents to [facility] for bilateral shoulder pain and discomfort.  History of Present Illness the patient states that he has pain and discomfort in both his shoulders.  The right one has been symptomatic for many months.  The left one has now started to bother him to some degree as well.  He has weakness in abduction.  He states that he might have injured his shoulders when he was lifting weights in the gym.  He tries to be as active as possible with his upper extremities but continues to have pain and discomfort in both his shoulders.  He is really not interested in any type of surgical consideration for either of his shoulders.  It does affect his quality of life in the sense that it restricts him from undertaking certain activities because of recurrence of the shoulder pain and discomfort.  Pain Location: BILATERAL shoulder  Radiation: none  Quality: dull  Intensity/Severity: moderate  Duration: Several months  Progression of symptoms: no worsening, symptoms stable/unchanged  Onset quality: gradual   Timing: intermittent  Aggravating Factors: reaching forward, reaching across body, rotating motion  Alleviating Factors: NSAIDs  Previous Episodes: yes  Associated Symptoms: pain, swelling, decreased strength  ADLs Affected: work related activities, recreational activities/sports  Previous Treatment: NSAIDs and intra-articular injection       Objective   Vital Signs:   There were no vitals taken for this visit.    Physical Exam  Physical Exam  Vitals signs and nursing note reviewed.   Constitutional:       Appearance: Normal appearance.   Pulmonary:      Effort: Pulmonary effort is normal.   Skin:     General: Skin is warm and dry.      Capillary Refill: Capillary refill takes less than 2 seconds.   Neurological:       General: No focal deficit present.      Mental Status: He is alert and oriented to person, place, and time. Mental status is at baseline.   Psychiatric:         Mood and Affect: Mood normal.         Behavior: Behavior normal.         Thought Content: Thought content normal.         Judgment: Judgment normal.     Ortho Exam   Bilateral shoulder (cta). Rotator cuff function is extremely impaired. There is a high riding humeral head with articulation of the humerus to the undersurface of the acromiohumeral articulation. AC joint is exquisitely tender and painful for patient. Axillary nerve function is well preserved. There is significant winging of the scapula with hollowing of the fossae over the proximal and distal aspects of the spine of the scapula. Forward flexion is 0-30 degrees, active abduction is 0-60 degrees. Drop arm sign is positive. External rotation against resistance is extremely painful and limited for the patient. Skin and soft tissues are essentially normal other than some amounts of swelling. The pain level is 5        Result Review :   The following data was reviewed by: Chet Raymundo MD on 03/09/2022:    xrays obtained today  bilateral Shoulder X-Ray  Indication: Evaluation of pain and discomfort in both shoulders.  AP and Lateral  Findings: Sclerosis over the greater tuberosity of the humerus with slight proximal migration of the humeral head consistent with cuff tear arthropathy.  no bony lesion  Soft tissues within normal limits  decreased joint spaces  Hardware appropriately positioned not applicable      yes prior studies available for comparison.    X-RAY was ordered and reviewed by Chet Raymundo MD              Large Joint Arthrocentesis: R glenohumeral  Date/Time: 3/9/2022 1:54 PM  Consent given by: patient  Site marked: site marked  Timeout: Immediately prior to procedure a time out was called to verify the correct patient, procedure, equipment, support staff and site/side marked as  required   Supporting Documentation  Indications: pain   Procedure Details  Location: shoulder - R glenohumeral  Preparation: Patient was prepped and draped in the usual sterile fashion  Needle size: 25 G  Approach: anteromedial  Medications administered: 2 mL lidocaine PF 1% 1 %; 80 mg triamcinolone acetonide 40 MG/ML  Patient tolerance: patient tolerated the procedure well with no immediate complications    Large Joint Arthrocentesis: L glenohumeral  Date/Time: 3/9/2022 1:55 PM  Consent given by: patient  Site marked: site marked  Timeout: Immediately prior to procedure a time out was called to verify the correct patient, procedure, equipment, support staff and site/side marked as required   Supporting Documentation  Indications: pain   Procedure Details  Location: shoulder - L glenohumeral  Preparation: Patient was prepped and draped in the usual sterile fashion  Needle size: 25 G  Approach: anteromedial  Medications administered: 2 mL lidocaine PF 1% 1 %; 80 mg triamcinolone acetonide 40 MG/ML  Patient tolerance: patient tolerated the procedure well with no immediate complications                 Assessment   Assessment and Plan    Diagnoses and all orders for this visit:    1. Acute pain of left shoulder (Primary)  -     XR Shoulder 2+ View Left  -     Large Joint Arthrocentesis: L glenohumeral    2. Nontraumatic complete tear of right rotator cuff  -     Large Joint Arthrocentesis: R glenohumeral          Follow Up   · .  Patient's right shoulder with a steroid from an anteromedial approach.  · Injected patient's left shoulder with a steroid from an anteromedial approach.  · Calcium and vitamin D for bone health.  · Tablet meloxicam 7.5 mg tab 1 p.o. nightly for pain swelling and discomfort.  · Rest, ice, compression, and elevation (RICE) therapy  · Stretching and strengthening exercises of the flexors and abductors of the shoulder to prevent arthrofibrosis.  · OTC Tylenol 500-1000mg by mouth every 6 hours as  needed for pain   · Follow up in 3 month(s).  · Discussed with the patient that he will most certainly require reverse shoulder arthroplasty in the near future but he will let me know when he is ready for that form of intervention.  • Patient was given instructions and counseling regarding his condition or for health maintenance advice. Please see specific information pulled into the AVS if appropriate.     Chet Raymundo MD   Date of Encounter: 3/9/2022        EMR Dragon/Transcription disclaimer:  Much of this encounter note is an electronic transcription/translation of spoken language to printed text. The electronic translation of spoken language may permit erroneous, or at times, nonsensical words or phrases to be inadvertently transcribed; Although I have reviewed the note for such errors, some may still exist.

## 2022-03-16 RX ORDER — LIDOCAINE HYDROCHLORIDE 10 MG/ML
2 INJECTION, SOLUTION EPIDURAL; INFILTRATION; INTRACAUDAL; PERINEURAL
Status: COMPLETED | OUTPATIENT
Start: 2022-03-09 | End: 2022-03-09

## 2022-03-16 RX ORDER — TRIAMCINOLONE ACETONIDE 40 MG/ML
80 INJECTION, SUSPENSION INTRA-ARTICULAR; INTRAMUSCULAR
Status: COMPLETED | OUTPATIENT
Start: 2022-03-09 | End: 2022-03-09

## 2022-06-22 ENCOUNTER — TELEPHONE (OUTPATIENT)
Dept: ORTHOPEDIC SURGERY | Facility: CLINIC | Age: 82
End: 2022-06-22

## 2022-07-05 ENCOUNTER — TELEPHONE (OUTPATIENT)
Dept: ORTHOPEDIC SURGERY | Facility: CLINIC | Age: 82
End: 2022-07-05

## 2022-07-05 NOTE — TELEPHONE ENCOUNTER
Caller: Keith Hankins Jr.    Relationship to patient: Self    Best call back number: [phone]    Chief complaint: BILATERAL SHOULDER INJECTION    Type of visit: FOLLOW UP    Requested date: 07/05/22    If rescheduling, when is the original appointment: 08/03/22 AT 11:00 AM    Additional notes: PATIENT STATES HE IS IN SOME SEVERE PAIN AND REALLY WANTS TO BE SEEN SOONER IF POSSIBLE. PLEASE ADVISE.

## 2022-07-06 ENCOUNTER — OFFICE VISIT (OUTPATIENT)
Dept: ORTHOPEDIC SURGERY | Facility: CLINIC | Age: 82
End: 2022-07-06

## 2022-07-06 DIAGNOSIS — M75.121 NONTRAUMATIC COMPLETE TEAR OF RIGHT ROTATOR CUFF: Primary | ICD-10-CM

## 2022-07-06 DIAGNOSIS — M75.122 NONTRAUMATIC COMPLETE TEAR OF LEFT ROTATOR CUFF: ICD-10-CM

## 2022-07-06 PROCEDURE — 20610 DRAIN/INJ JOINT/BURSA W/O US: CPT | Performed by: ORTHOPAEDIC SURGERY

## 2022-07-06 RX ADMIN — LIDOCAINE HYDROCHLORIDE 2 ML: 10 INJECTION, SOLUTION INFILTRATION; PERINEURAL at 14:02

## 2022-07-06 RX ADMIN — TRIAMCINOLONE ACETONIDE 80 MG: 40 INJECTION, SUSPENSION INTRA-ARTICULAR; INTRAMUSCULAR at 14:02

## 2022-07-06 NOTE — PROGRESS NOTES
INJECTION    Patient: Keith Hankins Jr.    YOB: 1940    MRN: [id number]    Chief Complaint   Patient presents with   • Right Shoulder - Follow-up   • Left Shoulder - Follow-up       History of Present Illness:  Patient returns today for follow-up on right shoulder pain. His pain is generalized in the shoulder, has been progressive and remains intermittent . His pain is worsened by overhead reaching and improved with injections.    This problem is not new to this examiner.     Allergies: No Known Allergies    Medications:   Home Medications:  Current Outpatient Medications on File Prior to Visit   Medication Sig   • acetaminophen (TYLENOL) 650 MG 8 hr tablet Take 650 mg by mouth 2 (Two) Times a Day.   • allopurinol (ZYLOPRIM) 100 MG tablet Take 100 mg by mouth Daily.   • amLODIPine (NORVASC) 5 MG tablet Take 5 mg by mouth Daily.   • aspirin (ECOTRIN) 325 MG EC tablet Take 325 mg by mouth Daily.   • atorvastatin (LIPITOR) 40 MG tablet Take 40 mg by mouth Daily.   • buPROPion XL (WELLBUTRIN XL) 150 MG 24 hr tablet Take 150 mg by mouth Every Morning.   • erythromycin (ROMYCIN) 5 MG/GM ophthalmic ointment erythromycin 5 mg/gram (0.5 %) eye ointment   • escitalopram (LEXAPRO) 10 MG tablet Take 10 mg by mouth Daily.   • furosemide (LASIX) 20 MG tablet Take 20 mg by mouth Daily.   • hydrocortisone 2.5 % cream hydrocortisone 2.5 % topical cream   • ketoconazole (NIZORAL) 2 % cream ketoconazole 2 % topical cream   • Multiple Vitamins-Minerals (CENTRUM SILVER ULTRA MENS PO) Take 1 tablet by mouth Daily.   • tamsulosin (FLOMAX) 0.4 MG capsule 24 hr capsule Take 1 capsule by mouth Daily.   • Testosterone Cypionate (DEPOTESTOTERONE CYPIONATE) 200 MG/ML injection TAKE 1 ML EVERY 2 WEEKS   • valsartan (DIOVAN) 320 MG tablet Take 1 tablet by mouth Daily.     No current facility-administered medications on file prior to visit.     Current Medications:  Scheduled Meds:  PRN Meds:.    I have reviewed the patient's  "medical history in detail and updated the computerized patient record.  Review and summarization of old records include:    Past Medical History:   Diagnosis Date   • Atherosclerotic heart disease of native coronary artery without angina pectoris    • Benign essential hypertension    • CKD (chronic kidney disease), stage III (HCC)    • Degeneration of cervical intervertebral disc    • Depression with anxiety    • ED (erectile dysfunction)    • High cholesterol    • Hypertension    • MI (myocardial infarction) (HCC)    • Spinal stenosis    • Syncope      Past Surgical History:   Procedure Laterality Date   • BACK SURGERY     • CATARACT EXTRACTION Bilateral    • COLONOSCOPY N/A 2017    Procedure: COLONOSCOPY TO CECUM;  Surgeon: Emmanuel ePterson MD;  Location: [facility];  Service:    • NECK SURGERY     • TONSILLECTOMY       Social History     Occupational History   • Not on file   Tobacco Use   • Smoking status: Former Smoker     Types: Cigarettes     Quit date:      Years since quittin.5   • Smokeless tobacco: Never Used   • Tobacco comment: daily caffeine use   Substance and Sexual Activity   • Alcohol use: Yes     Comment: occasional use   • Drug use: No   • Sexual activity: Defer      Social History     Social History Narrative   • Not on file     Family History   Problem Relation Age of Onset   • Cancer Mother         breast   • Stroke Mother    • Heart disease Father        Review of Systems        Wt Readings from Last 3 Encounters:   22 85.3 kg (188 lb)   21 85.7 kg (189 lb)   09/10/21 81.6 kg (180 lb)     Ht Readings from Last 3 Encounters:   22 175.3 cm (69.02\")   21 175.3 cm (69\")   09/10/21 175.3 cm (69\")     There is no height or weight on file to calculate BMI.  No height and weight on file for this encounter.  There were no vitals filed for this visit.    Physical Exam    Musculoskeletal:    Right shoulder (cta). Rotator cuff function is extremely impaired. " There is a high riding humeral head with articulation of the humerus to the undersurface of the acromiohumeral articulation. AC joint is exquisitely tender and painful for patient. Axillary nerve function is well preserved. There is significant winging of the scapula with hollowing of the fossae over the proximal and distal aspects of the spine of the scapula. Forward flexion is 0-30 degrees, active abduction is 0-60 degrees. Drop arm sign is positive. External rotation against resistance is extremely painful and limited for the patient. Skin and soft tissues are essentially normal other than some amounts of swelling. The pain level is 4      Diagnostics:      Procedure:  Large Joint Arthrocentesis: R glenohumeral  Date/Time: 7/6/2022 2:02 PM  Consent given by: patient  Site marked: site marked  Timeout: Immediately prior to procedure a time out was called to verify the correct patient, procedure, equipment, support staff and site/side marked as required   Supporting Documentation  Indications: pain   Procedure Details  Location: shoulder - R glenohumeral  Preparation: Patient was prepped and draped in the usual sterile fashion  Needle size: 25 G  Approach: anteromedial  Medications administered: 2 mL lidocaine 1 %; 80 mg triamcinolone acetonide 40 MG/ML  Patient tolerance: patient tolerated the procedure well with no immediate complications    Large Joint Arthrocentesis: L glenohumeral  Date/Time: 7/6/2022 2:02 PM  Consent given by: patient  Site marked: site marked  Timeout: Immediately prior to procedure a time out was called to verify the correct patient, procedure, equipment, support staff and site/side marked as required   Supporting Documentation  Indications: pain   Procedure Details  Location: shoulder - L glenohumeral  Preparation: Patient was prepped and draped in the usual sterile fashion  Needle size: 25 G  Approach: anteromedial  Medications administered: 2 mL lidocaine 1 %; 80 mg triamcinolone  acetonide 40 MG/ML  Patient tolerance: patient tolerated the procedure well with no immediate complications            Assessment:   Diagnoses and all orders for this visit:    1. Nontraumatic complete tear of right rotator cuff (Primary)    2. Nontraumatic complete tear of left rotator cuff    Other orders  -     Large Joint Arthrocentesis: R glenohumeral  -     Large Joint Arthrocentesis: L glenohumeral          Plan:   · Injected patient's right shoulder joint(s)with Kenalog l from an anterolateral approach   · Rest, ice, compression, and elevation (RICE) therapy  · OTC Alternate Ibuprofen and Tylenol as needed  · Follow up in 3 month(s)    Date of encounter: 11/10/2021   Chet Raymundo MD

## 2022-07-11 PROBLEM — M75.122 NONTRAUMATIC COMPLETE TEAR OF LEFT ROTATOR CUFF: Status: ACTIVE | Noted: 2022-07-11

## 2022-07-11 RX ORDER — LIDOCAINE HYDROCHLORIDE 10 MG/ML
2 INJECTION, SOLUTION INFILTRATION; PERINEURAL
Status: COMPLETED | OUTPATIENT
Start: 2022-07-06 | End: 2022-07-06

## 2022-07-11 RX ORDER — TRIAMCINOLONE ACETONIDE 40 MG/ML
80 INJECTION, SUSPENSION INTRA-ARTICULAR; INTRAMUSCULAR
Status: COMPLETED | OUTPATIENT
Start: 2022-07-06 | End: 2022-07-06

## 2022-07-12 ENCOUNTER — OFFICE VISIT (OUTPATIENT)
Dept: CARDIOLOGY | Facility: CLINIC | Age: 82
End: 2022-07-12

## 2022-07-12 VITALS
DIASTOLIC BLOOD PRESSURE: 74 MMHG | SYSTOLIC BLOOD PRESSURE: 136 MMHG | WEIGHT: 185 LBS | BODY MASS INDEX: 27.4 KG/M2 | HEIGHT: 69 IN | HEART RATE: 88 BPM | OXYGEN SATURATION: 97 %

## 2022-07-12 DIAGNOSIS — I25.10 CORONARY ARTERY DISEASE INVOLVING NATIVE CORONARY ARTERY OF NATIVE HEART WITHOUT ANGINA PECTORIS: Primary | ICD-10-CM

## 2022-07-12 DIAGNOSIS — I10 PRIMARY HYPERTENSION: ICD-10-CM

## 2022-07-12 PROCEDURE — 99213 OFFICE O/P EST LOW 20 MIN: CPT | Performed by: INTERNAL MEDICINE

## 2022-07-12 NOTE — PROGRESS NOTES
"      CARDIOLOGY    Sean Thompson MD    ENCOUNTER DATE:  07/12/2022    Keith Hankins Jr. / 82 y.o. / male        CHIEF COMPLAINT / REASON FOR OFFICE VISIT     Coronary Artery Disease (01/03/2022 Follow up)      HISTORY OF PRESENT ILLNESS       HPI  Keith Hankins Jr. is a 82 y.o. male who presents today for reevaluation.  Clinically he is doing good he is having some shortness of breath but he attributes this to more GI symptoms.  He said he ate some pizza last night and had tunnel reflux with that.  He is going to see his PCP for referral.  He says when he bends over he gets a little short of breath sometimes and get a little bit lightheaded with this also.  He has stress echocardiogram 2 years ago that was unremarkable.      The following portions of the patient's history were reviewed and updated as appropriate: allergies, current medications, past family history, past medical history, past social history, past surgical history and problem list.      VITAL SIGNS     Visit Vitals  /74 (BP Location: Left arm, Patient Position: Sitting, Cuff Size: Adult)   Pulse 88   Ht 175.3 cm (69\")   Wt 83.9 kg (185 lb)   SpO2 97%   BMI 27.32 kg/m²         Wt Readings from Last 3 Encounters:   07/12/22 83.9 kg (185 lb)   01/03/22 85.3 kg (188 lb)   11/01/21 85.7 kg (189 lb)     Body mass index is 27.32 kg/m².      REVIEW OF SYSTEMS   ROS        PHYSICAL EXAMINATION     Vitals reviewed.   Constitutional:       Appearance: Healthy appearance.   Pulmonary:      Effort: Pulmonary effort is normal.   Cardiovascular:      Normal rate. Regular rhythm. Normal S1. Normal S2.      Murmurs: There is no murmur.      No gallop. No click. No rub.   Pulses:     Intact distal pulses.   Edema:     Peripheral edema absent.   Neurological:      Mental Status: Alert and oriented to person, place and time.           REVIEWED DATA     Procedures    Cardiac Procedures:  1.           ASSESSMENT & PLAN      Diagnosis Plan   1. Coronary " artery disease involving native coronary artery of native heart without angina pectoris     2. Primary hypertension           SUMMARY/DISCUSSION  1. Coronary artery disease.  Clinically I think he is stable he is having some shortness of breath I think secondary to reflux.  He is going to get evaluated by GI physician and also see his primary care physician.  I told him if things do not improve I would consider redoing a stress test but at this point I do not think it is necessary.  See him back in a year.  2. Hypertension his blood pressures good        MEDICATIONS         Discharge Medications          Accurate as of July 12, 2022 12:07 PM. If you have any questions, ask your nurse or doctor.            Continue These Medications      Instructions Start Date   acetaminophen 650 MG 8 hr tablet  Commonly known as: TYLENOL   650 mg, Oral, 2 Times Daily      allopurinol 100 MG tablet  Commonly known as: ZYLOPRIM   100 mg, Oral, Daily      amLODIPine 5 MG tablet  Commonly known as: NORVASC   5 mg, Oral, Daily      aspirin  MG tablet   325 mg, Oral, Daily      atorvastatin 40 MG tablet  Commonly known as: LIPITOR   40 mg, Oral, Daily      buPROPion  MG 24 hr tablet  Commonly known as: WELLBUTRIN XL   150 mg, Oral, Every Morning      erythromycin 5 MG/GM ophthalmic ointment  Commonly known as: ROMYCIN   erythromycin 5 mg/gram (0.5 %) eye ointment      escitalopram 10 MG tablet  Commonly known as: LEXAPRO   10 mg, Oral, Daily      furosemide 20 MG tablet  Commonly known as: LASIX   20 mg, Oral, Daily      hydrocortisone 2.5 % cream   hydrocortisone 2.5 % topical cream      ketoconazole 2 % cream  Commonly known as: NIZORAL   ketoconazole 2 % topical cream      multivitamin with minerals tablet tablet   1 tablet, Oral, Daily      tamsulosin 0.4 MG capsule 24 hr capsule  Commonly known as: FLOMAX   1 capsule, Oral, Daily      Testosterone Cypionate 200 MG/ML injection  Commonly known as: DEPOTESTOTERONE  CYPIONATE   TAKE 1 ML EVERY 2 WEEKS      valsartan 320 MG tablet  Commonly known as: DIOVAN   320 mg, Oral, Daily                 **Dragon Disclaimer:   Much of this encounter note is an electronic transcription/translation of spoken language to printed text. The electronic translation of spoken language may permit erroneous, or at times, nonsensical words or phrases to be inadvertently transcribed. Although I have reviewed the note for such errors, some may still exist.

## 2022-10-12 ENCOUNTER — OFFICE VISIT (OUTPATIENT)
Dept: ORTHOPEDIC SURGERY | Facility: CLINIC | Age: 82
End: 2022-10-12

## 2022-10-12 DIAGNOSIS — M75.121 NONTRAUMATIC COMPLETE TEAR OF RIGHT ROTATOR CUFF: Primary | ICD-10-CM

## 2022-10-12 DIAGNOSIS — M75.122 NONTRAUMATIC COMPLETE TEAR OF LEFT ROTATOR CUFF: ICD-10-CM

## 2022-10-12 PROCEDURE — 20610 DRAIN/INJ JOINT/BURSA W/O US: CPT | Performed by: ORTHOPAEDIC SURGERY

## 2022-10-12 RX ORDER — METHYLPREDNISOLONE ACETATE 40 MG/ML
80 INJECTION, SUSPENSION INTRA-ARTICULAR; INTRALESIONAL; INTRAMUSCULAR; SOFT TISSUE
Status: COMPLETED | OUTPATIENT
Start: 2022-10-12 | End: 2022-10-12

## 2022-10-12 RX ORDER — LIDOCAINE HYDROCHLORIDE 10 MG/ML
2 INJECTION, SOLUTION INFILTRATION; PERINEURAL
Status: COMPLETED | OUTPATIENT
Start: 2022-10-12 | End: 2022-10-12

## 2022-10-12 RX ADMIN — METHYLPREDNISOLONE ACETATE 80 MG: 40 INJECTION, SUSPENSION INTRA-ARTICULAR; INTRALESIONAL; INTRAMUSCULAR; SOFT TISSUE at 12:42

## 2022-10-12 RX ADMIN — LIDOCAINE HYDROCHLORIDE 2 ML: 10 INJECTION, SOLUTION INFILTRATION; PERINEURAL at 12:42

## 2022-10-12 NOTE — PROGRESS NOTES
INJECTION    Patient: Keith Hankins Jr.    YOB: 1940    MRN: [id number]    Chief Complaint   Patient presents with   • Right Shoulder - Follow-up   • Left Shoulder - Follow-up       History of Present Illness:  Patient returns today for follow-up on bilateral shoulder pain. His pain is generalized in the shoulder, has been progressive and remains intermittent . His pain is worsened by overhead reaching, reaching backward and improved with injections.    This problem is not new to this examiner.     Allergies:   Allergies   Allergen Reactions   • Aspirin Nausea Only       Medications:   Home Medications:  Current Outpatient Medications on File Prior to Visit   Medication Sig   • allopurinol (ZYLOPRIM) 100 MG tablet Take 100 mg by mouth Daily.   • amLODIPine (NORVASC) 5 MG tablet Take 5 mg by mouth Daily.   • aspirin EC (aspirin) 325 MG tablet Take 325 mg by mouth Daily.   • atorvastatin (LIPITOR) 40 MG tablet Take 40 mg by mouth Daily.   • buPROPion XL (WELLBUTRIN XL) 150 MG 24 hr tablet Take 150 mg by mouth Every Morning.   • escitalopram (LEXAPRO) 10 MG tablet Take 10 mg by mouth Daily.   • furosemide (LASIX) 20 MG tablet Take 20 mg by mouth Daily.   • hydrocortisone 2.5 % cream hydrocortisone 2.5 % topical cream   • ketoconazole (NIZORAL) 2 % cream ketoconazole 2 % topical cream   • Multiple Vitamins-Minerals (CENTRUM SILVER ULTRA MENS PO) Take 1 tablet by mouth Daily.   • ondansetron ODT (Zofran ODT) 8 MG disintegrating tablet Place 1 tablet on the tongue Every 8 (Eight) Hours As Needed for Nausea or Vomiting.   • tamsulosin (FLOMAX) 0.4 MG capsule 24 hr capsule Take 1 capsule by mouth Daily.   • valsartan (DIOVAN) 320 MG tablet Take 1 tablet by mouth Daily.     No current facility-administered medications on file prior to visit.     Current Medications:  Scheduled Meds:  PRN Meds:.    I have reviewed the patient's medical history in detail and updated the computerized patient record.  Review and  "summarization of old records include:    Past Medical History:   Diagnosis Date   • Atherosclerotic heart disease of native coronary artery without angina pectoris    • Benign essential hypertension    • CKD (chronic kidney disease), stage III ([facility])    • Degeneration of cervical intervertebral disc    • Depression with anxiety    • ED (erectile dysfunction)    • High cholesterol    • Hypertension    • MI (myocardial infarction) ([facility])    • Spinal stenosis    • Syncope      Past Surgical History:   Procedure Laterality Date   • BACK SURGERY     • CATARACT EXTRACTION Bilateral    • COLONOSCOPY N/A 2017    Procedure: COLONOSCOPY TO CECUM;  Surgeon: Emmanuel Peterson MD;  Location: [facility] ENDOSCOPY;  Service:    • NECK SURGERY     • TONSILLECTOMY       Social History     Occupational History   • Not on file   Tobacco Use   • Smoking status: Former     Types: Cigarettes     Quit date:      Years since quittin.8   • Smokeless tobacco: Never   • Tobacco comments:     daily caffeine use   Vaping Use   • Vaping Use: Never used   Substance and Sexual Activity   • Alcohol use: Yes     Comment: occasional use   • Drug use: No   • Sexual activity: Defer      Social History     Social History Narrative   • Not on file     Family History   Problem Relation Age of Onset   • Cancer Mother         breast   • Stroke Mother    • Heart disease Father        Review of Systems        Wt Readings from Last 3 Encounters:   22 77.1 kg (170 lb)   22 83.9 kg (185 lb)   22 85.3 kg (188 lb)     Ht Readings from Last 3 Encounters:   22 175.3 cm (69\")   22 175.3 cm (69\")   22 175.3 cm (69.02\")     There is no height or weight on file to calculate BMI.  No height and weight on file for this encounter.  There were no vitals filed for this visit.    Physical Exam    Musculoskeletal:    Bilateral shoulder (cta). Rotator cuff function is extremely impaired. There is a high riding humeral head with " articulation of the humerus to the undersurface of the acromiohumeral articulation. AC joint is exquisitely tender and painful for patient. Axillary nerve function is well preserved. There is significant winging of the scapula with hollowing of the fossae over the proximal and distal aspects of the spine of the scapula. Forward flexion is 0-30 degrees, active abduction is 0-60 degrees. Drop arm sign is positive. External rotation against resistance is extremely painful and limited for the patient. Skin and soft tissues are essentially normal other than some amounts of swelling. The pain level is 5      Diagnostics:      Procedure:  Large Joint Arthrocentesis: R glenohumeral  Date/Time: 10/12/2022 12:42 PM  Consent given by: patient  Site marked: site marked  Timeout: Immediately prior to procedure a time out was called to verify the correct patient, procedure, equipment, support staff and site/side marked as required   Supporting Documentation  Indications: pain   Procedure Details  Location: shoulder - R glenohumeral  Preparation: Patient was prepped and draped in the usual sterile fashion  Needle size: 25 G  Approach: anteromedial  Medications administered: 2 mL lidocaine 1 %; 80 mg methylPREDNISolone acetate 40 MG/ML  Patient tolerance: patient tolerated the procedure well with no immediate complications    Large Joint Arthrocentesis: L glenohumeral  Date/Time: 10/12/2022 12:42 PM  Consent given by: patient  Site marked: site marked  Timeout: Immediately prior to procedure a time out was called to verify the correct patient, procedure, equipment, support staff and site/side marked as required   Supporting Documentation  Indications: pain   Procedure Details  Location: shoulder - L glenohumeral  Preparation: Patient was prepped and draped in the usual sterile fashion  Needle size: 25 G  Approach: anteromedial  Medications administered: 2 mL lidocaine 1 %; 80 mg methylPREDNISolone acetate 40 MG/ML  Patient  tolerance: patient tolerated the procedure well with no immediate complications            Assessment:   Diagnoses and all orders for this visit:    1. Nontraumatic complete tear of right rotator cuff (Primary)    2. Nontraumatic complete tear of left rotator cuff    Other orders  -     Large Joint Arthrocentesis: R glenohumeral  -     Large Joint Arthrocentesis: L glenohumeral          Plan:   · Injected patient's bilateral shoulder joint(s)with Depo-Medrol from an anterolateral approach   · Compression/brace   · Rest, ice, compression, and elevation (RICE) therapy  · OTC Alternate Ibuprofen and Tylenol as needed  · Follow up in 3 month(s)    Date of encounter: 10/12/2022   Chet Raymundo MD

## 2022-12-14 NOTE — ED PROVIDER NOTES
"EMERGENCY DEPARTMENT ENCOUNTER    CHIEF COMPLAINT  Chief Complaint: left ankle and calf pain  History given by: pt  History limited by: N/A  Room Number: 06/06  PMD: Clara Ann Pallares, MD      HPI:  Pt is a 77 y.o. male who presents complaining of intermittent left ankle and left calf pain with associated surrounding erythema and swelling that began 2 days ago, but with worsening symptoms beginning this morning. Pt states he was unable to ambulate this morning secondary to pain and pt is still having difficulty ambulating currently. Pt denies fever, chills, SOB, CP, or any other pertinent symptoms. Pt was taking 325 mg ASA last week, but was instructed to stop taking this medication because of his sinus surgery that is scheduled for next week.    Duration:  2 days with worsening symptoms that began this morning   Onset: gradual   Timing: intermittent   Location: left ankle and left calf   Radiation: None reported   Quality: \"pain\"  Intensity/Severity: moderate   Progression: worsening   Associated Symptoms: Erythema, swelling, pain to the left ankle and left calf   Aggravating Factors: Movement, ambulating   Alleviating Factors: None reported   Previous Episodes: None reported  Treatment before arrival: Pt stopped taking his 325 mg ASA last week due to his scheduled sinus surgery for next week.     PAST MEDICAL HISTORY  Active Ambulatory Problems     Diagnosis Date Noted   • No Active Ambulatory Problems     Resolved Ambulatory Problems     Diagnosis Date Noted   • No Resolved Ambulatory Problems     Past Medical History:   Diagnosis Date   • Atherosclerotic heart disease of native coronary artery without angina pectoris    • Benign essential hypertension    • CKD (chronic kidney disease), stage III    • Degeneration of cervical intervertebral disc    • Depression with anxiety    • ED (erectile dysfunction)    • High cholesterol    • Hypertension    • MI (myocardial infarction)    • Spinal stenosis    • Syncope  "       PAST SURGICAL HISTORY  Past Surgical History:   Procedure Laterality Date   • BACK SURGERY     • CATARACT EXTRACTION Bilateral    • COLONOSCOPY N/A 2/28/2017    Procedure: COLONOSCOPY TO CECUM;  Surgeon: Emmanuel Peterson MD;  Location: [facility] ENDOSCOPY;  Service:    • NECK SURGERY     • TONSILLECTOMY         FAMILY HISTORY  Family History   Problem Relation Age of Onset   • Cancer Mother      breast   • Stroke Mother    • Heart disease Father        SOCIAL HISTORY  Social History     Social History   • Marital status:      Spouse name: N/A   • Number of children: N/A   • Years of education: N/A     Occupational History   • Not on file.     Social History Main Topics   • Smoking status: Former Smoker     Types: Cigarettes     Quit date: 2/28/1957   • Smokeless tobacco: Not on file      Comment: daily caffeine use   • Alcohol use Yes      Comment: occasional use   • Drug use: No   • Sexual activity: Defer     Other Topics Concern   • Not on file     Social History Narrative       ALLERGIES  Review of patient's allergies indicates no known allergies.    REVIEW OF SYSTEMS  Review of Systems   Constitutional: Negative.  Negative for chills and fever.   HENT: Negative.  Negative for sore throat.    Eyes: Negative.    Respiratory: Negative.  Negative for cough and shortness of breath.    Cardiovascular: Positive for leg swelling (lower left extremity ). Negative for chest pain.   Gastrointestinal: Negative.    Genitourinary: Negative.  Negative for dysuria.   Musculoskeletal: Positive for arthralgias (left ankle ), joint swelling (left ankle ) and myalgias (left calf ). Negative for back pain.   Skin: Negative.  Negative for rash.   Neurological: Negative.  Negative for headaches.       PHYSICAL EXAM  ED Triage Vitals   Temp Heart Rate Resp BP SpO2   10/01/17 2044 10/01/17 2044 10/01/17 2044 10/01/17 2044 10/01/17 2044   97.8 °F (36.6 °C) 65 18 126/69 100 %      Temp src Heart Rate Source Patient Position BP  Location FiO2 (%)   -- -- -- -- --              Physical Exam   Constitutional: He is oriented to person, place, and time and well-developed, well-nourished, and in no distress.   HENT:   Head: Normocephalic and atraumatic.   Eyes: EOM are normal. Pupils are equal, round, and reactive to light.   Neck: Normal range of motion. Neck supple.   Cardiovascular: Normal rate, regular rhythm, normal heart sounds and intact distal pulses.    Pulmonary/Chest: Effort normal and breath sounds normal. No respiratory distress.   Abdominal: Soft. There is no tenderness. There is no rebound and no guarding.   Musculoskeletal: Normal range of motion. He exhibits tenderness. He exhibits no edema.   Medial aspect of the left ankle tenderness, soft-tissue swelling, and mild erythema. Left calf tenderness, no swelling and no erythema. Achilles is intact and non-tender.    Neurological: He is alert and oriented to person, place, and time. He has normal sensation and normal strength.   Reflex Scores:       Achilles reflexes are 2+ on the right side and 2+ on the left side.  Skin: Skin is warm and dry. There is erythema (left ankle  ).   Psychiatric: Mood and affect normal.   Nursing note and vitals reviewed.      LAB RESULTS  Lab Results (last 24 hours)     Procedure Component Value Units Date/Time    CBC & Differential [[id number]] Collected:  10/01/17 2338    Specimen:  Blood Updated:  10/01/17 2350    Narrative:       The following orders were created for panel order CBC & Differential.  Procedure                               Abnormality         Status                     ---------                               -----------         ------                     CBC Auto Differential[[id number]]        Abnormal            Final result                 Please view results for these tests on the individual orders.    Comprehensive Metabolic Panel [[id number]]  (Abnormal) Collected:  10/01/17 2338    Specimen:  Blood Updated:  10/02/17 0012      Glucose 93 mg/dL      BUN 25 (H) mg/dL      Creatinine 1.30 (H) mg/dL      Sodium 141 mmol/L      Potassium 4.8 mmol/L      Chloride 106 mmol/L      CO2 23.3 mmol/L      Calcium 9.3 mg/dL      Total Protein 6.5 g/dL      Albumin 4.20 g/dL      ALT (SGPT) 25 U/L      AST (SGOT) 21 U/L      Alkaline Phosphatase 67 U/L      Total Bilirubin 0.4 mg/dL      eGFR Non African Amer 54 (L) mL/min/1.73      Globulin 2.3 gm/dL      A/G Ratio 1.8 g/dL      BUN/Creatinine Ratio 19.2     Anion Gap 11.7 mmol/L     Narrative:       The MDRD GFR formula is only valid for adults with stable renal function between ages 18 and 70.    CBC Auto Differential [[id number]]  (Abnormal) Collected:  10/01/17 2338    Specimen:  Blood Updated:  10/01/17 2350     WBC 8.87 10*3/mm3      RBC 4.55 (L) 10*6/mm3      Hemoglobin 14.8 g/dL      Hematocrit 45.6 %      .2 (H) fL      MCH 32.5 pg      MCHC 32.5 (L) g/dL      RDW 12.6 %      RDW-SD 46.1 fl      MPV 10.7 fL      Platelets 185 10*3/mm3      Neutrophil % 75.3 %      Lymphocyte % 13.3 (L) %      Monocyte % 8.6 %      Eosinophil % 2.4 %      Basophil % 0.2 %      Immature Grans % 0.2 %      Neutrophils, Absolute 6.68 10*3/mm3      Lymphocytes, Absolute 1.18 10*3/mm3      Monocytes, Absolute 0.76 10*3/mm3      Eosinophils, Absolute 0.21 10*3/mm3      Basophils, Absolute 0.02 10*3/mm3      Immature Grans, Absolute 0.02 10*3/mm3     Uric Acid [[id number]]  (Abnormal) Collected:  10/01/17 2338    Specimen:  Blood Updated:  10/02/17 0012     Uric Acid 7.7 (H) mg/dL           I ordered the above labs and reviewed the results    RADIOLOGY  XR Ankle 3+ View Left   Final Result   1. Small avulsion fracture along the undersurface of the medial   malleolus which may be chronic given lack of trauma history. Please   correlate clinically..       This report was finalized on 10/1/2017 9:16 PM by Lalito Rapp MD.               I ordered the above noted radiological studies. Interpreted by radiologist.  Reviewed by me in PACS.       PROCEDURES  Procedures      PROGRESS AND CONSULTS  ED Course   Value Comment By Time   Uric Acid: (!) 7.7 (Reviewed) BARI Carrillo III 10/02 0029     2049: Ordered Left Ankle XR and labs for further evaluation.     2340: Discussed pt's case with Dr. Leyva.    2354: Rechecked pt who is resting comfortably in bed. Discussed radiology results and plan to evaluate labs to rule out cellulitis and gout. Discussed that if labs are normal, plan to place pt in an immobilizer boot and to discharge pt with a f/u to an orthopedic. Plan to place pt on steroids for the inflammation. Pt/ family understand and agree to plan, all questions addressed at time.     0029: Ordered foot immobilizer.     0037: Rechecked pt and discussed lab results. Pt's uric acid is elevated, but due to normal WBC, there is little suspicion for gout. Plan to immobilize left ankle. Recommend pt avoid anti-inflammatories due to slightly elevated kidney function labs. Pt states this is chronic. Offer crutches for assistance. Recommend pt wears knee high Collin hoes or compression socks to prevent blood clots from developing.       MEDICAL DECISION MAKING  Results were reviewed/discussed with the patient and they were also made aware of online access. Pt also made aware that some labs, such as cultures, will not be resulted during ER visit and follow up with PMD is necessary.     MDM  Number of Diagnoses or Management Options     Amount and/or Complexity of Data Reviewed  Clinical lab tests: ordered and reviewed (Uric Acid = 7.7)  Tests in the radiology section of CPT®: ordered and reviewed (Left Ankle XR: Impression    1. Small avulsion fracture along the undersurface of the medial malleolus which may be chronic given lack of trauma history. Please correlate clinically..    )  Decide to obtain previous medical records or to obtain history from someone other than the patient: yes  Review and summarize past medical  records: yes (Pt was taken off of his blood thinners due to his nasal surgery that is scheduled for next. )  Independent visualization of images, tracings, or specimens: yes           DIAGNOSIS  Final diagnoses:   Acute left ankle pain   Closed avulsion fracture of medial malleolus of left tibia, initial encounter       DISPOSITION  DISCHARGE    Patient discharged in stable condition.    Reviewed implications of results, diagnosis, meds, responsibility to follow up, warning signs and symptoms of possible worsening, potential complications and reasons to return to ER.    Patient/Family voiced understanding of above instructions.    Discussed plan for discharge, as there is no emergent indication for admission.  Pt/family is agreeable and understands need for follow up and repeat testing.  Pt is aware that discharge does not mean that nothing is wrong but it indicates no emergency is present that requires admission and they must continue care with follow-up as given below or physician of their choice.     FOLLOW-UP  Jeff Pettit MD  [address] [id number] [phone]    Schedule an appointment as soon as possible for a visit  For further evaluation and treatment         Medication List      New Prescriptions          HYDROcodone-acetaminophen 5-325 MG per tablet   Commonly known as:  NORCO   Take 1 tablet by mouth Every 6 (Six) Hours As Needed for Severe Pain .       ondansetron ODT 4 MG disintegrating tablet   Commonly known as:  ZOFRAN ODT   Take 1 tablet by mouth Every 8 (Eight) Hours As Needed for Nausea (Take   with pain medication if it makes you nauseated).               Latest Documented Vital Signs:  As of 2:37 AM  BP- 160/83 HR- 67 Temp- 97.8 °F (36.6 °C) O2 sat- 100%    --  Documentation assistance provided by Tuan Balderas PA-C.  Information recorded by the scribe was done at my direction and has been verified and validated by me.     Eduar Ocasio  10/02/17  0030       Eduar Ocasio  10/02/17 0139       BARI Carrillo III  10/02/17 0237     Topical Sulfur Applications Counseling: Topical Sulfur Counseling: Patient counseled that this medication may cause skin irritation or allergic reactions.  In the event of skin irritation, the patient was advised to reduce the amount of the drug applied or use it less frequently.   The patient verbalized understanding of the proper use and possible adverse effects of topical sulfur application.  All of the patient's questions and concerns were addressed.

## 2022-12-15 RX ORDER — VALSARTAN 320 MG/1
TABLET ORAL
Qty: 90 TABLET | Refills: 3 | Status: SHIPPED | OUTPATIENT
Start: 2022-12-15

## 2023-01-18 ENCOUNTER — OFFICE VISIT (OUTPATIENT)
Dept: ORTHOPEDIC SURGERY | Facility: CLINIC | Age: 83
End: 2023-01-18
Payer: MEDICARE

## 2023-01-18 DIAGNOSIS — M75.122 NONTRAUMATIC COMPLETE TEAR OF LEFT ROTATOR CUFF: ICD-10-CM

## 2023-01-18 DIAGNOSIS — M75.101 ROTATOR CUFF TEAR, NON-TRAUMATIC, RIGHT: Primary | ICD-10-CM

## 2023-01-18 PROCEDURE — 20610 DRAIN/INJ JOINT/BURSA W/O US: CPT | Performed by: ORTHOPAEDIC SURGERY

## 2023-01-18 RX ORDER — TRIAMCINOLONE ACETONIDE 40 MG/ML
80 INJECTION, SUSPENSION INTRA-ARTICULAR; INTRAMUSCULAR
Status: COMPLETED | OUTPATIENT
Start: 2023-01-18 | End: 2023-01-18

## 2023-01-18 RX ORDER — LIDOCAINE HYDROCHLORIDE 10 MG/ML
2 INJECTION, SOLUTION EPIDURAL; INFILTRATION; INTRACAUDAL; PERINEURAL
Status: COMPLETED | OUTPATIENT
Start: 2023-01-18 | End: 2023-01-18

## 2023-01-18 RX ADMIN — LIDOCAINE HYDROCHLORIDE 2 ML: 10 INJECTION, SOLUTION EPIDURAL; INFILTRATION; INTRACAUDAL; PERINEURAL at 10:49

## 2023-01-18 RX ADMIN — TRIAMCINOLONE ACETONIDE 80 MG: 40 INJECTION, SUSPENSION INTRA-ARTICULAR; INTRAMUSCULAR at 10:49

## 2023-01-18 NOTE — PROGRESS NOTES
INJECTION    Patient: Keith Hankins Jr.    YOB: 1940    MRN: [id number]    Chief Complaint   Patient presents with   • Right Shoulder - Follow-up   • Left Shoulder - Follow-up       History of Present Illness:  Patient returns today for follow-up on bilateral shoulder pain. His pain is generalized in the shoulder, has been progressive and remains intermittent . His pain is worsened by overhead reaching, reaching backward and improved with injections.    This problem is not new to this examiner.     Allergies:   Allergies   Allergen Reactions   • Aspirin Nausea Only       Medications:   Home Medications:  Current Outpatient Medications on File Prior to Visit   Medication Sig   • allopurinol (ZYLOPRIM) 100 MG tablet Take 100 mg by mouth Daily.   • amLODIPine (NORVASC) 5 MG tablet Take 5 mg by mouth Daily.   • aspirin EC (aspirin) 325 MG tablet Take 325 mg by mouth Daily.   • atorvastatin (LIPITOR) 40 MG tablet Take 40 mg by mouth Daily.   • buPROPion XL (WELLBUTRIN XL) 150 MG 24 hr tablet Take 150 mg by mouth Every Morning.   • escitalopram (LEXAPRO) 10 MG tablet Take 10 mg by mouth Daily.   • furosemide (LASIX) 20 MG tablet Take 20 mg by mouth Daily.   • hydrocortisone 2.5 % cream hydrocortisone 2.5 % topical cream   • ketoconazole (NIZORAL) 2 % cream ketoconazole 2 % topical cream   • Multiple Vitamins-Minerals (CENTRUM SILVER ULTRA MENS PO) Take 1 tablet by mouth Daily.   • ondansetron ODT (Zofran ODT) 8 MG disintegrating tablet Place 1 tablet on the tongue Every 8 (Eight) Hours As Needed for Nausea or Vomiting.   • tamsulosin (FLOMAX) 0.4 MG capsule 24 hr capsule Take 1 capsule by mouth Daily.   • valsartan (DIOVAN) 320 MG tablet TAKE 1 TABLET BY MOUTH EVERY DAY     No current facility-administered medications on file prior to visit.     Current Medications:  Scheduled Meds:  PRN Meds:.    I have reviewed the patient's medical history in detail and updated the computerized patient record.  Review  "and summarization of old records include:    Past Medical History:   Diagnosis Date   • Atherosclerotic heart disease of native coronary artery without angina pectoris    • Benign essential hypertension    • CKD (chronic kidney disease), stage III ([facility])    • Degeneration of cervical intervertebral disc    • Depression with anxiety    • ED (erectile dysfunction)    • High cholesterol    • Hypertension    • MI (myocardial infarction) ([facility])    • Spinal stenosis    • Syncope      Past Surgical History:   Procedure Laterality Date   • BACK SURGERY     • CATARACT EXTRACTION Bilateral    • COLONOSCOPY N/A 2017    Procedure: COLONOSCOPY TO CECUM;  Surgeon: Emmanuel Peterson MD;  Location: [facility] ENDOSCOPY;  Service:    • NECK SURGERY     • TONSILLECTOMY       Social History     Occupational History   • Not on file   Tobacco Use   • Smoking status: Former     Types: Cigarettes     Quit date:      Years since quittin.0   • Smokeless tobacco: Never   • Tobacco comments:     daily caffeine use   Vaping Use   • Vaping Use: Never used   Substance and Sexual Activity   • Alcohol use: Yes     Comment: occasional use   • Drug use: No   • Sexual activity: Defer      Social History     Social History Narrative   • Not on file     Family History   Problem Relation Age of Onset   • Cancer Mother         breast   • Stroke Mother    • Heart disease Father        Review of Systems        Wt Readings from Last 3 Encounters:   22 77.1 kg (170 lb)   22 83.9 kg (185 lb)   22 85.3 kg (188 lb)     Ht Readings from Last 3 Encounters:   22 175.3 cm (69\")   22 175.3 cm (69\")   22 175.3 cm (69.02\")     There is no height or weight on file to calculate BMI.  No height and weight on file for this encounter.  There were no vitals filed for this visit.    Physical Exam    Musculoskeletal:    Bilateral shoulder (cta). Rotator cuff function is extremely impaired. There is a high riding humeral head with " articulation of the humerus to the undersurface of the acromiohumeral articulation. AC joint is exquisitely tender and painful for patient. Axillary nerve function is well preserved. There is significant winging of the scapula with hollowing of the fossae over the proximal and distal aspects of the spine of the scapula. Forward flexion is 0-30 degrees, active abduction is 0-60 degrees. Drop arm sign is positive. External rotation against resistance is extremely painful and limited for the patient. Skin and soft tissues are essentially normal other than some amounts of swelling. The pain level is 5      Diagnostics:      Procedure:  Large Joint Arthrocentesis: L glenohumeral  Date/Time: 1/18/2023 10:49 AM  Consent given by: patient  Site marked: site marked  Timeout: Immediately prior to procedure a time out was called to verify the correct patient, procedure, equipment, support staff and site/side marked as required   Supporting Documentation  Indications: pain   Procedure Details  Location: shoulder - L glenohumeral  Preparation: Patient was prepped and draped in the usual sterile fashion  Needle size: 25 G  Approach: anteromedial  Medications administered: 2 mL lidocaine PF 1% 1 %; 80 mg triamcinolone acetonide 40 MG/ML  Patient tolerance: patient tolerated the procedure well with no immediate complications    Large Joint Arthrocentesis: R glenohumeral  Date/Time: 1/18/2023 10:49 AM  Consent given by: patient  Site marked: site marked  Timeout: Immediately prior to procedure a time out was called to verify the correct patient, procedure, equipment, support staff and site/side marked as required   Supporting Documentation  Indications: pain   Procedure Details  Location: shoulder - R glenohumeral  Preparation: Patient was prepped and draped in the usual sterile fashion  Needle size: 25 G  Approach: anteromedial  Medications administered: 2 mL lidocaine PF 1% 1 %; 80 mg triamcinolone acetonide 40 MG/ML  Patient  tolerance: patient tolerated the procedure well with no immediate complications            Assessment:   Diagnoses and all orders for this visit:    1. Rotator cuff tear, non-traumatic, right (Primary)    2. Nontraumatic complete tear of left rotator cuff    Other orders  -     Large Joint Arthrocentesis: L glenohumeral  -     Large Joint Arthrocentesis: R glenohumeral          Plan:   · Injected patient's bilateral shoulder joint(s)with Kenalog from an anterolateral approach   · Compression/brace   · Rest, ice, compression, and elevation (RICE) therapy  · OTC Tylenol 500-1000mg by mouth every 6 hours as needed for pain   · Follow up in 3 month(s)    Date of encounter: 01/18/2023   Chet Raymundo MD

## 2023-04-19 ENCOUNTER — OFFICE VISIT (OUTPATIENT)
Dept: ORTHOPEDIC SURGERY | Facility: CLINIC | Age: 83
End: 2023-04-19
Payer: MEDICARE

## 2023-04-19 DIAGNOSIS — M75.121 NONTRAUMATIC COMPLETE TEAR OF RIGHT ROTATOR CUFF: ICD-10-CM

## 2023-04-19 DIAGNOSIS — M75.122 NONTRAUMATIC COMPLETE TEAR OF LEFT ROTATOR CUFF: Primary | ICD-10-CM

## 2023-04-19 RX ORDER — LIDOCAINE HYDROCHLORIDE 10 MG/ML
2 INJECTION, SOLUTION INFILTRATION; PERINEURAL
Status: COMPLETED | OUTPATIENT
Start: 2023-04-19 | End: 2023-04-19

## 2023-04-19 RX ORDER — METHYLPREDNISOLONE ACETATE 40 MG/ML
80 INJECTION, SUSPENSION INTRA-ARTICULAR; INTRALESIONAL; INTRAMUSCULAR; SOFT TISSUE
Status: COMPLETED | OUTPATIENT
Start: 2023-04-19 | End: 2023-04-19

## 2023-04-19 RX ADMIN — METHYLPREDNISOLONE ACETATE 80 MG: 40 INJECTION, SUSPENSION INTRA-ARTICULAR; INTRALESIONAL; INTRAMUSCULAR; SOFT TISSUE at 11:24

## 2023-04-19 RX ADMIN — LIDOCAINE HYDROCHLORIDE 2 ML: 10 INJECTION, SOLUTION INFILTRATION; PERINEURAL at 11:24

## 2023-04-19 NOTE — PROGRESS NOTES
INJECTION    Patient: Keith Hankins Jr.    YOB: 1940    MRN: [id number]    Chief Complaint   Patient presents with   • Right Shoulder - Follow-up   • Left Shoulder - Follow-up       History of Present Illness:  Patient returns today for follow-up on bilateral shoulder pain. His pain is generalized in the shoulder, has been progressive and remains intermittent . His pain is worsened by overhead reaching and improved with injections.    This problem is not new to this examiner.     Allergies:   Allergies   Allergen Reactions   • Aspirin Nausea Only       Medications:   Home Medications:  Current Outpatient Medications on File Prior to Visit   Medication Sig   • allopurinol (ZYLOPRIM) 100 MG tablet Take 100 mg by mouth Daily.   • amLODIPine (NORVASC) 5 MG tablet Take 5 mg by mouth Daily.   • aspirin EC (aspirin) 325 MG tablet Take 325 mg by mouth Daily.   • atorvastatin (LIPITOR) 40 MG tablet Take 40 mg by mouth Daily.   • buPROPion XL (WELLBUTRIN XL) 150 MG 24 hr tablet Take 150 mg by mouth Every Morning.   • escitalopram (LEXAPRO) 10 MG tablet Take 10 mg by mouth Daily.   • furosemide (LASIX) 20 MG tablet Take 20 mg by mouth Daily.   • hydrocortisone 2.5 % cream hydrocortisone 2.5 % topical cream   • ketoconazole (NIZORAL) 2 % cream ketoconazole 2 % topical cream   • Multiple Vitamins-Minerals (CENTRUM SILVER ULTRA MENS PO) Take 1 tablet by mouth Daily.   • ondansetron ODT (Zofran ODT) 8 MG disintegrating tablet Place 1 tablet on the tongue Every 8 (Eight) Hours As Needed for Nausea or Vomiting.   • tamsulosin (FLOMAX) 0.4 MG capsule 24 hr capsule Take 1 capsule by mouth Daily.   • valsartan (DIOVAN) 320 MG tablet TAKE 1 TABLET BY MOUTH EVERY DAY     No current facility-administered medications on file prior to visit.     Current Medications:  Scheduled Meds:  PRN Meds:.    I have reviewed the patient's medical history in detail and updated the computerized patient record.  Review and summarization of  "old records include:    Past Medical History:   Diagnosis Date   • Atherosclerotic heart disease of native coronary artery without angina pectoris    • Benign essential hypertension    • CKD (chronic kidney disease), stage III    • Degeneration of cervical intervertebral disc    • Depression with anxiety    • ED (erectile dysfunction)    • High cholesterol    • Hypertension    • MI (myocardial infarction)    • Spinal stenosis    • Syncope      Past Surgical History:   Procedure Laterality Date   • BACK SURGERY     • CATARACT EXTRACTION Bilateral    • COLONOSCOPY N/A 2017    Procedure: COLONOSCOPY TO CECUM;  Surgeon: Emmanuel Peterson MD;  Location: [facility] ENDOSCOPY;  Service:    • NECK SURGERY     • TONSILLECTOMY       Social History     Occupational History   • Not on file   Tobacco Use   • Smoking status: Former     Types: Cigarettes     Quit date:      Years since quittin.3   • Smokeless tobacco: Never   • Tobacco comments:     daily caffeine use   Vaping Use   • Vaping Use: Never used   Substance and Sexual Activity   • Alcohol use: Yes     Comment: occasional use   • Drug use: No   • Sexual activity: Defer      Social History     Social History Narrative   • Not on file     Family History   Problem Relation Age of Onset   • Cancer Mother         breast   • Stroke Mother    • Heart disease Father        Review of Systems        Wt Readings from Last 3 Encounters:   22 77.1 kg (170 lb)   22 83.9 kg (185 lb)   22 85.3 kg (188 lb)     Ht Readings from Last 3 Encounters:   22 175.3 cm (69\")   22 175.3 cm (69\")   22 175.3 cm (69.02\")     There is no height or weight on file to calculate BMI.  No height and weight on file for this encounter.  There were no vitals filed for this visit.    Physical Exam    Musculoskeletal:    Bilateral shoulder (cta). Rotator cuff function is extremely impaired. There is a high riding humeral head with articulation of the humerus to the " undersurface of the acromiohumeral articulation. AC joint is exquisitely tender and painful for patient. Axillary nerve function is well preserved. There is significant winging of the scapula with hollowing of the fossae over the proximal and distal aspects of the spine of the scapula. Forward flexion is 0-30 degrees, active abduction is 0-60 degrees. Drop arm sign is positive. External rotation against resistance is extremely painful and limited for the patient. Skin and soft tissues are essentially normal other than some amounts of swelling. The pain level is 5      Diagnostics:      Procedure:  Large Joint Arthrocentesis: R glenohumeral  Date/Time: 4/19/2023 11:24 AM  Consent given by: patient  Site marked: site marked  Timeout: Immediately prior to procedure a time out was called to verify the correct patient, procedure, equipment, support staff and site/side marked as required   Supporting Documentation  Indications: pain   Procedure Details  Location: shoulder - R glenohumeral  Preparation: Patient was prepped and draped in the usual sterile fashion  Needle size: 25 G  Approach: anteromedial  Medications administered: 2 mL lidocaine 1 %; 80 mg methylPREDNISolone acetate 40 MG/ML  Patient tolerance: patient tolerated the procedure well with no immediate complications    Large Joint Arthrocentesis: L glenohumeral  Date/Time: 4/19/2023 11:24 AM  Consent given by: patient  Site marked: site marked  Timeout: Immediately prior to procedure a time out was called to verify the correct patient, procedure, equipment, support staff and site/side marked as required   Supporting Documentation  Indications: pain   Procedure Details  Location: shoulder - L glenohumeral  Preparation: Patient was prepped and draped in the usual sterile fashion  Needle size: 25 G  Approach: anteromedial  Medications administered: 2 mL lidocaine 1 %; 80 mg methylPREDNISolone acetate 40 MG/ML  Patient tolerance: patient tolerated the procedure  well with no immediate complications            Assessment:   Diagnoses and all orders for this visit:    1. Nontraumatic complete tear of left rotator cuff (Primary)  -     Large Joint Arthrocentesis: L glenohumeral    2. Nontraumatic complete tear of right rotator cuff  -     Large Joint Arthrocentesis: R glenohumeral          Plan:   · Injected patient's bilateral shoulder joint(s)with Depo-Medrol from an anterolateral approach   · Compression/brace   · Rest, ice, compression, and elevation (RICE) therapy  · OTC Tylenol 500-1000mg by mouth every 6 hours as needed for pain   · Follow up in 3 month(s)    Date of encounter: 04/19/2023   Chet Raymundo MD

## 2023-08-01 ENCOUNTER — TELEPHONE (OUTPATIENT)
Dept: ORTHOPEDIC SURGERY | Facility: CLINIC | Age: 83
End: 2023-08-01

## 2023-08-01 NOTE — TELEPHONE ENCOUNTER
Caller: Keith Hankins Jr.    Relationship: Self    Best call back number:     What was the call regarding: THE PATIENT WOULD LIKE TO KNOW WHY HIS APPT WITH DR AGUIRRE WAS CHANGED TO BE WITH CONCHITA RIOS. HE WOULD PREFER TO SEE DR AGUIRRE.     HE WOULD ALSO LIKE TO RESCHEDULE HIS APPT FOR 8/2/23 SO HE CAN SEE DR AGUIRRE.

## 2023-08-21 ENCOUNTER — TELEPHONE (OUTPATIENT)
Dept: ORTHOPEDIC SURGERY | Facility: CLINIC | Age: 83
End: 2023-08-21
Payer: MEDICARE

## 2023-08-21 NOTE — TELEPHONE ENCOUNTER
Caller: Keith Hankins Jr.    Relationship to patient: Self    Best call back number:     Chief complaint: BILATERAL SHOULDER INJECTIONS    Type of visit: CORTISONE INJECTIONS    Requested date: A FEW WEEKS LATER THAN 8/23/23    If rescheduling, when is the original appointment: 8/23/23

## 2023-09-01 ENCOUNTER — OFFICE VISIT (OUTPATIENT)
Dept: CARDIOLOGY | Facility: CLINIC | Age: 83
End: 2023-09-01
Payer: MEDICARE

## 2023-09-01 VITALS
SYSTOLIC BLOOD PRESSURE: 176 MMHG | HEIGHT: 70 IN | DIASTOLIC BLOOD PRESSURE: 100 MMHG | WEIGHT: 187.6 LBS | OXYGEN SATURATION: 98 % | HEART RATE: 68 BPM | BODY MASS INDEX: 26.86 KG/M2

## 2023-09-01 DIAGNOSIS — I25.10 CORONARY ARTERY DISEASE INVOLVING NATIVE CORONARY ARTERY OF NATIVE HEART WITHOUT ANGINA PECTORIS: Primary | ICD-10-CM

## 2023-09-01 DIAGNOSIS — I10 PRIMARY HYPERTENSION: ICD-10-CM

## 2023-09-01 PROCEDURE — 99213 OFFICE O/P EST LOW 20 MIN: CPT | Performed by: INTERNAL MEDICINE

## 2023-09-01 PROCEDURE — 3077F SYST BP >= 140 MM HG: CPT | Performed by: INTERNAL MEDICINE

## 2023-09-01 PROCEDURE — 3080F DIAST BP >= 90 MM HG: CPT | Performed by: INTERNAL MEDICINE

## 2023-09-01 RX ORDER — VALSARTAN 320 MG/1
320 TABLET ORAL DAILY
Qty: 90 TABLET | Refills: 3 | Status: SHIPPED | OUTPATIENT
Start: 2023-09-01

## 2023-09-01 NOTE — PROGRESS NOTES
"      CARDIOLOGY    Sean Thompson MD    ENCOUNTER DATE:  09/01/2023    Keith Hankins Jr. / 83 y.o. / male        CHIEF COMPLAINT / REASON FOR OFFICE VISIT     Coronary Artery Disease (07/14/2023 Follow up)  Hypertension    HISTORY OF PRESENT ILLNESS       Coronary Artery Disease    Kieth Hankins Jr. is a 83 y.o. male who presents today for reevaluation.  Patient's blood pressure remains elevated.  He was checking it since had seen him last and is continued to be elevated and he now realizes that he needs to go back on his medication and he is willing to do so.      The following portions of the patient's history were reviewed and updated as appropriate: allergies, current medications, past family history, past medical history, past social history, past surgical history and problem list.      VITAL SIGNS     Visit Vitals  /100 (BP Location: Left arm)   Pulse 68   Ht 176.5 cm (69.5\")   Wt 85.1 kg (187 lb 9.6 oz)   SpO2 98%   BMI 27.31 kg/mý         Wt Readings from Last 3 Encounters:   09/01/23 85.1 kg (187 lb 9.6 oz)   07/14/23 83.5 kg (184 lb)   05/27/23 77.1 kg (170 lb)     Body mass index is 27.31 kg/mý.      REVIEW OF SYSTEMS   ROS        PHYSICAL EXAMINATION     Vitals reviewed.   Constitutional:       Appearance: Healthy appearance.   Pulmonary:      Effort: Pulmonary effort is normal.   Cardiovascular:      Normal rate. Regular rhythm. Normal S1. Normal S2.       Murmurs: There is no murmur.      No gallop.  No click. No rub.   Pulses:     Intact distal pulses.   Edema:     Peripheral edema absent.   Neurological:      Mental Status: Alert.         REVIEWED DATA     Procedures    Cardiac Procedures:            ASSESSMENT & PLAN      Diagnosis Plan   1. Coronary artery disease involving native coronary artery of native heart without angina pectoris        2. Primary hypertension              SUMMARY/DISCUSSION  Coronary artery disease.  Stable no symptoms.  Hypertension clearly out-of-control he " is willing take his medications.  I told him to start back with his valsartan.  He could take a half a pill for about a week then go up to 320 a day.  We will reassess him in about 4 to 6 weeks he is going to continue to follow his blood pressure at home.        MEDICATIONS         Discharge Medications            Accurate as of September 1, 2023 12:13 PM. If you have any questions, ask your nurse or doctor.                Continue These Medications        Instructions Start Date   allopurinol 100 MG tablet  Commonly known as: ZYLOPRIM   100 mg, Oral, Daily      aspirin 325 MG EC tablet   325 mg, Oral, Daily      atorvastatin 40 MG tablet  Commonly known as: LIPITOR   40 mg, Oral, Daily      buPROPion  MG 24 hr tablet  Commonly known as: WELLBUTRIN XL   150 mg, Oral, Every Morning      escitalopram 10 MG tablet  Commonly known as: LEXAPRO   10 mg, Oral, Daily      fish oil 1000 MG capsule capsule   2 g, Oral      fluocinolone acetonide 0.01 % oil otic oil  Commonly known as: DERMOTIC   fluocinolone acetonide oil 0.01 % ear drops      furosemide 20 MG tablet  Commonly known as: LASIX   20 mg, Oral, Daily      HYDROcodone-acetaminophen 5-325 MG per tablet  Commonly known as: NORCO   hydrocodone 5 mg-acetaminophen 325 mg tablet   TAKE 1 TABLET BY MOUTH EVERY DAY AS NEEDED      hydrocortisone 2.5 % cream   hydrocortisone 2.5 % topical cream      ipratropium 0.06 % nasal spray  Commonly known as: ATROVENT   SPRAY 2 SPRAYS INTO EACH NOSTRIL TWICE A DAY      ketoconazole 2 % cream  Commonly known as: NIZORAL   ketoconazole 2 % topical cream      multivitamin with minerals tablet tablet   1 tablet, Oral, Daily      omeprazole 10 MG capsule  Commonly known as: prilOSEC   20 mg, Oral      tamsulosin 0.4 MG capsule 24 hr capsule  Commonly known as: FLOMAX   1 capsule, Oral, Daily      valsartan 320 MG tablet  Commonly known as: DIOVAN   320 mg, Oral, Daily             Stop These Medications      amLODIPine 5 MG  tablet  Commonly known as: NORVASC  Stopped by: Sean Thompson MD     metoprolol succinate XL 50 MG 24 hr tablet  Commonly known as: TOPROL-XL  Stopped by: Sean Thompson MD     ondansetron ODT 8 MG disintegrating tablet  Commonly known as: Zofran ODT  Stopped by: Sean Thompson MD                  **Dragon Disclaimer:   Much of this encounter note is an electronic transcription/translation of spoken language to printed text. The electronic translation of spoken language may permit erroneous, or at times, nonsensical words or phrases to be inadvertently transcribed. Although I have reviewed the note for such errors, some may still exist.

## 2023-09-11 ENCOUNTER — TELEPHONE (OUTPATIENT)
Dept: ORTHOPEDIC SURGERY | Facility: CLINIC | Age: 83
End: 2023-09-11
Payer: MEDICARE

## 2023-09-11 NOTE — TELEPHONE ENCOUNTER
Caller: Keith Hankins Jr.    Relationship to patient: Self    Best call back number: [phone]    Patient is needing: PATIENT WANTS TO KNOW IF HIS INJECTION COULD BE MOVED TO EARLY IN THE MORNING OR LAT EIN THE AFTERNOON

## 2023-09-15 ENCOUNTER — OFFICE VISIT (OUTPATIENT)
Dept: ORTHOPEDIC SURGERY | Facility: CLINIC | Age: 83
End: 2023-09-15
Payer: MEDICARE

## 2023-09-15 VITALS — OXYGEN SATURATION: 98 % | WEIGHT: 187 LBS | RESPIRATION RATE: 20 BRPM | HEIGHT: 69 IN | BODY MASS INDEX: 27.7 KG/M2

## 2023-09-15 DIAGNOSIS — M75.122 NONTRAUMATIC COMPLETE TEAR OF LEFT ROTATOR CUFF: Primary | ICD-10-CM

## 2023-09-15 DIAGNOSIS — M75.121 NONTRAUMATIC COMPLETE TEAR OF RIGHT ROTATOR CUFF: ICD-10-CM

## 2023-09-15 RX ORDER — LIDOCAINE HYDROCHLORIDE 10 MG/ML
2 INJECTION, SOLUTION EPIDURAL; INFILTRATION; INTRACAUDAL; PERINEURAL
Status: COMPLETED | OUTPATIENT
Start: 2023-09-15 | End: 2023-09-15

## 2023-09-15 RX ORDER — TRIAMCINOLONE ACETONIDE 40 MG/ML
80 INJECTION, SUSPENSION INTRA-ARTICULAR; INTRAMUSCULAR
Status: COMPLETED | OUTPATIENT
Start: 2023-09-15 | End: 2023-09-15

## 2023-09-15 RX ADMIN — LIDOCAINE HYDROCHLORIDE 2 ML: 10 INJECTION, SOLUTION EPIDURAL; INFILTRATION; INTRACAUDAL; PERINEURAL at 09:58

## 2023-09-15 RX ADMIN — TRIAMCINOLONE ACETONIDE 80 MG: 40 INJECTION, SUSPENSION INTRA-ARTICULAR; INTRAMUSCULAR at 09:58

## 2023-09-15 RX ADMIN — LIDOCAINE HYDROCHLORIDE 2 ML: 10 INJECTION, SOLUTION EPIDURAL; INFILTRATION; INTRACAUDAL; PERINEURAL at 09:59

## 2023-09-15 RX ADMIN — TRIAMCINOLONE ACETONIDE 80 MG: 40 INJECTION, SUSPENSION INTRA-ARTICULAR; INTRAMUSCULAR at 09:59

## 2023-09-15 NOTE — PROGRESS NOTES
INJECTION VISIT    Patient: Keith Hankins Jr.    YOB: 1940    MRN: [id number]    Chief Complaint   Patient presents with    Right Shoulder - Follow-up, Pain     Pain-     Left Shoulder - Follow-up, Pain     Pain- 4       History of Present Illness:   Keith Hankins Jr. is a 83 y.o. year old who presents today for injection of bilateral shoulders.     He has no new complaints or concerns today. Injections are very helpful with his symptoms. Last visit April 2023. No new injuries or trauma.     Please see proc doc for full procedural details.         Allergies:   Allergies   Allergen Reactions    Aspirin Nausea Only       Medications:   Home Medications:  Current Outpatient Medications on File Prior to Visit   Medication Sig    allopurinol (ZYLOPRIM) 100 MG tablet Take 1 tablet by mouth Daily.    aspirin EC (aspirin) 325 MG tablet Take 1 tablet by mouth Daily.    atorvastatin (LIPITOR) 40 MG tablet Take 1 tablet by mouth Daily.    buPROPion XL (WELLBUTRIN XL) 150 MG 24 hr tablet Take 1 tablet by mouth Every Morning.    escitalopram (LEXAPRO) 10 MG tablet Take 1 tablet by mouth Daily.    fluocinolone acetonide (DERMOTIC) 0.01 % oil otic oil fluocinolone acetonide oil 0.01 % ear drops    furosemide (LASIX) 20 MG tablet Take 1 tablet by mouth Daily.    HYDROcodone-acetaminophen (NORCO) 5-325 MG per tablet hydrocodone 5 mg-acetaminophen 325 mg tablet   TAKE 1 TABLET BY MOUTH EVERY DAY AS NEEDED    hydrocortisone 2.5 % cream hydrocortisone 2.5 % topical cream    ipratropium (ATROVENT) 0.06 % nasal spray SPRAY 2 SPRAYS INTO EACH NOSTRIL TWICE A DAY    ketoconazole (NIZORAL) 2 % cream ketoconazole 2 % topical cream    Multiple Vitamins-Minerals (CENTRUM SILVER ULTRA MENS PO) Take 1 tablet by mouth Daily.    Omega-3 Fatty Acids (fish oil) 1000 MG capsule capsule Take 2 capsules by mouth.    omeprazole (prilOSEC) 10 MG capsule Take 2 capsules by mouth.    tamsulosin (FLOMAX) 0.4 MG capsule 24 hr capsule Take 1  capsule by mouth Daily.    valsartan (DIOVAN) 320 MG tablet Take 1 tablet by mouth Daily.     No current facility-administered medications on file prior to visit.         I have reviewed the patient's medical history in detail and updated the computerized patient record.  Review and summarization of old records include:    Past Medical History:   Diagnosis Date    Atherosclerotic heart disease of native coronary artery without angina pectoris     Benign essential hypertension     CKD (chronic kidney disease), stage III     Degeneration of cervical intervertebral disc     Depression with anxiety     ED (erectile dysfunction)     High cholesterol     Hypertension     MI (myocardial infarction)     Spinal stenosis     Syncope      Past Surgical History:   Procedure Laterality Date    BACK SURGERY      CATARACT EXTRACTION Bilateral     COLONOSCOPY N/A 2017    Procedure: COLONOSCOPY TO CECUM;  Surgeon: Emmanuel Peterson MD;  Location: [facility] ENDOSCOPY;  Service:     NECK SURGERY      TONSILLECTOMY       Social History     Occupational History    Not on file   Tobacco Use    Smoking status: Former     Types: Cigarettes     Quit date:      Years since quittin.7    Smokeless tobacco: Never    Tobacco comments:     daily caffeine use   Vaping Use    Vaping Use: Never used   Substance and Sexual Activity    Alcohol use: Yes     Comment: occasional use    Drug use: Yes     Types: Marijuana    Sexual activity: Defer      Social History     Social History Narrative    Not on file     Family History   Problem Relation Age of Onset    Cancer Mother         breast    Stroke Mother     Heart disease Father                ROS  Negative unless listed in the HPI        Physical Exam  83 y.o. male  Body mass index is 27.62 kg/m²., 84.8 kg (187 lb)  Vitals:    09/15/23 0939   Resp: 20   SpO2: 98%     General: Alert, cooperative, appears well and in no observable distress.   HEENT: Normocephalic, atraumatic on external visual  inspection. No icterus.   CV: No significant peripheral edema.   Respiratory: Normal respiratory effort.   Skin: Warm & well perfused; appropriate skin turgor.  Psych: Appropriate mood & affect.  Neuro: Gross sensation and motor intact in affected extremity/extremities.  Vascular: Peripheral pulses palpable in affected extremity/extremities.     Ortho Exam     Bilateral shoulder:   Rotator cuff function  impaired.  Axillary nerve function is well preserved. There is significant winging of the scapula with hollowing of the fossae over the proximal and distal aspects of the spine of the scapula. Forward flexion is 0-30 degrees, active abduction is 0-60 degrees.  External rotation against resistance is  painful and limited for the patient. Skin and soft tissues are essentially normal other than some amounts of swelling.      Procedure:  Large Joint Arthrocentesis: R glenohumeral  Date/Time: 9/15/2023 9:58 AM  Consent given by: patient  Site marked: site marked  Timeout: Immediately prior to procedure a time out was called to verify the correct patient, procedure, equipment, support staff and site/side marked as required   Supporting Documentation  Indications: pain and diagnostic evaluation   Procedure Details  Location: shoulder - R glenohumeral  Needle size: 22 G  Approach: anterior  Medications administered: 2 mL lidocaine PF 1% 1 %; 80 mg triamcinolone acetonide 40 MG/ML  Patient tolerance: patient tolerated the procedure well with no immediate complications      Large Joint Arthrocentesis: L glenohumeral  Date/Time: 9/15/2023 9:59 AM  Consent given by: patient  Site marked: site marked  Timeout: Immediately prior to procedure a time out was called to verify the correct patient, procedure, equipment, support staff and site/side marked as required   Supporting Documentation  Indications: pain and diagnostic evaluation   Procedure Details  Location: shoulder - L glenohumeral  Needle size: 22 G  Approach:  anterior  Medications administered: 2 mL lidocaine PF 1% 1 %; 80 mg triamcinolone acetonide 40 MG/ML  Patient tolerance: patient tolerated the procedure well with no immediate complications        Assessment:   Bilateral shoulder pain  Bilateral rotator cuff tear  Body mass index is 27.62 kg/m².  BMI consistent with Normal: 18.5-24.9kg/m2        Plan:   -     Risks and benefits of injection therapy discussed with patient.  Possibility of bruising, pain, swelling, infection, increased blood sugar levels, cortisol flare and soft tissue atrophy discussed in detail.  Injected patient's bilateral shoulder joint(s)with Kenalog from an  anterior  approach   Compression/brace   Rest, ice, compression, and elevation (RICE) therapy  OTC Tylenol for pain PRN  BMI reviewed  Follow up in 3 months for repeat injections  Please call with questions or concerns in the interim        Date of encounter: 09/15/2023   CODEY Murphy

## 2023-09-15 NOTE — PATIENT INSTRUCTIONS
Post Injection Instructions    Following an injection of the joint or trigger point injection there are a few things to be aware of.   Cortisone/Steroid Injections  If you received a cortisone injection you may experience a cortisol flare. This means you may be sore/achy in the injected area the night of the injection or in the days following the injection. This pain can be worse than your initial pain prior to the injection. IF this occurs, it will usually subside within 24-72 hours. In some cases it can last 1-2 weeks although this happens much less frequently.   If you develop the cortisol flare you should elevate the affected area, use an ace wrap if possible, ice the area and take ibuprofen or tylenol (if you are able to).   There may be times you do not develop a cortisol flare and other times that you might develop a cortisol flare. Unfortunately, there is no way to tell if you will develop or why you may have developed it with one injection but not with another.   You may also experience swelling or stiffness of the joint following the injection.  As a general guideline, you may receive a steroid injection every 3 months, with a maximum of 2 joints per visit.   The injection can last anywhere from 4-12 weeks. In some cases the injections do not last this long and in other cases it may last longer.       Monovisc Injections  If you received a Monovisc (gel) injection: While there are no reported side effects you may experience some discomfort following the injection. Your knee(s) may feel stiff or swollen following the injection. This occurs because there is a substance injected into the knee joint space that is not normally present.  If you develop pain or discomfort  Following the injection you should elevate the affected area, use an ace wrap if possible, ice the area and take ibuprofen or tylenol (if you are able to).  You may wrap the affected area for as long as you would like or feels  comfortable.  These injections typically work on approximately 70% of patients. We have had patients report the injections may not work to the fullest extent following the first injection but seem to improve with subsequent injections.   These injections can be given every 6 months and 1 day. Some patients experience relief for this amount of time and others may experience shorter or longer duration of relief.    Synvisc One Injections  If you received a Synvisc One injection, or other brand of gel injection not listed: You may experience some discomfort following the injection. Your knee(s) may feel stiff or swollen following the injection. This occurs because there is a substance injected into the knee joint space that is not normally present.  If you develop pain or discomfort  Following the injection you should elevate the affected area, use an ace wrap if possible, ice the area and take ibuprofen or tylenol (if you are able to).  You may wrap the affected area for as long as you would like or feels comfortable.  These injections typically work on approximately 70% of patients. We have had patients report the injections may not work to the fullest extent following the first injection but seem to improve with subsequent injections.   These injections can be given every 6 months and 1 day. Some patients experience relief for this amount of time and others may experience shorter or longer duration of relief.

## 2024-01-05 ENCOUNTER — OFFICE VISIT (OUTPATIENT)
Dept: ORTHOPEDIC SURGERY | Facility: CLINIC | Age: 84
End: 2024-01-05
Payer: MEDICARE

## 2024-01-05 VITALS — OXYGEN SATURATION: 97 % | WEIGHT: 187 LBS | HEIGHT: 69 IN | RESPIRATION RATE: 20 BRPM | BODY MASS INDEX: 27.7 KG/M2

## 2024-01-05 DIAGNOSIS — M75.122 NONTRAUMATIC COMPLETE TEAR OF LEFT ROTATOR CUFF: Primary | ICD-10-CM

## 2024-01-05 DIAGNOSIS — M75.121 NONTRAUMATIC COMPLETE TEAR OF RIGHT ROTATOR CUFF: ICD-10-CM

## 2024-01-05 RX ORDER — TRIAMCINOLONE ACETONIDE 40 MG/ML
80 INJECTION, SUSPENSION INTRA-ARTICULAR; INTRAMUSCULAR
Status: COMPLETED | OUTPATIENT
Start: 2024-01-05 | End: 2024-01-05

## 2024-01-05 RX ORDER — METHYLPREDNISOLONE 4 MG/1
TABLET ORAL
COMMUNITY
Start: 2023-12-10

## 2024-01-05 RX ORDER — AZITHROMYCIN 250 MG/1
TABLET, FILM COATED ORAL
COMMUNITY
Start: 2023-12-10

## 2024-01-05 RX ORDER — TESTOSTERONE CYPIONATE 200 MG/ML
INJECTION, SOLUTION INTRAMUSCULAR
COMMUNITY
Start: 2023-12-10

## 2024-01-05 RX ORDER — ESCITALOPRAM OXALATE 5 MG/1
1 TABLET ORAL DAILY
COMMUNITY

## 2024-01-05 RX ORDER — LIDOCAINE HYDROCHLORIDE 10 MG/ML
2 INJECTION, SOLUTION INFILTRATION; PERINEURAL
Status: COMPLETED | OUTPATIENT
Start: 2024-01-05 | End: 2024-01-05

## 2024-01-05 RX ADMIN — TRIAMCINOLONE ACETONIDE 80 MG: 40 INJECTION, SUSPENSION INTRA-ARTICULAR; INTRAMUSCULAR at 11:52

## 2024-01-05 RX ADMIN — LIDOCAINE HYDROCHLORIDE 2 ML: 10 INJECTION, SOLUTION INFILTRATION; PERINEURAL at 11:53

## 2024-01-05 RX ADMIN — TRIAMCINOLONE ACETONIDE 80 MG: 40 INJECTION, SUSPENSION INTRA-ARTICULAR; INTRAMUSCULAR at 11:53

## 2024-01-05 RX ADMIN — LIDOCAINE HYDROCHLORIDE 2 ML: 10 INJECTION, SOLUTION INFILTRATION; PERINEURAL at 11:52

## 2024-01-05 NOTE — PROGRESS NOTES
INJECTION VISIT    Patient: Keith Hankins Jr.    YOB: 1940    MRN: [id number]    Chief Complaint   Patient presents with    Right Shoulder - Follow-up    Left Shoulder - Follow-up       History of Present Illness:   Keith Hankins Jr. is a 83 y.o. year old who presents today for injection of bilateral shoulders. Last injection received ~ 3 months ago and provided good relief.         Allergies:   Allergies   Allergen Reactions    Aspirin Nausea Only       Medications:   Home Medications:  Current Outpatient Medications on File Prior to Visit   Medication Sig    allopurinol (ZYLOPRIM) 100 MG tablet Take 1 tablet by mouth Daily.    aspirin EC (aspirin) 325 MG tablet Take 1 tablet by mouth Daily.    atorvastatin (LIPITOR) 40 MG tablet Take 1 tablet by mouth Daily.    azithromycin (ZITHROMAX) 250 MG tablet TAKE 2 TABLETS BY MOUTH ON DAY 1 AND 1 TABLET EACH DAY ON DAYS 2-5    buPROPion XL (WELLBUTRIN XL) 150 MG 24 hr tablet Take 1 tablet by mouth Every Morning.    escitalopram (LEXAPRO) 5 MG tablet Take 1 tablet by mouth Daily.    fluocinolone acetonide (DERMOTIC) 0.01 % oil otic oil fluocinolone acetonide oil 0.01 % ear drops    furosemide (LASIX) 20 MG tablet Take 1 tablet by mouth Daily.    HYDROcodone-acetaminophen (NORCO) 5-325 MG per tablet hydrocodone 5 mg-acetaminophen 325 mg tablet   TAKE 1 TABLET BY MOUTH EVERY DAY AS NEEDED    hydrocortisone 2.5 % cream hydrocortisone 2.5 % topical cream    ipratropium (ATROVENT) 0.06 % nasal spray SPRAY 2 SPRAYS INTO EACH NOSTRIL TWICE A DAY    ketoconazole (NIZORAL) 2 % cream ketoconazole 2 % topical cream    methylPREDNISolone (MEDROL) 4 MG dose pack TAKE 6 TABLETS BY MOUTH ON DAY ONE AND DECREASE BY 1 TABLET EACH DAY UNTIL GONE    Multiple Vitamins-Minerals (CENTRUM SILVER ULTRA MENS PO) Take 1 tablet by mouth Daily.    Omega-3 Fatty Acids (fish oil) 1000 MG capsule capsule Take 2 capsules by mouth.    omeprazole (prilOSEC) 10 MG capsule Take 2 capsules by  mouth.    tamsulosin (FLOMAX) 0.4 MG capsule 24 hr capsule Take 1 capsule by mouth Daily.    Testosterone Cypionate (DEPOTESTOTERONE CYPIONATE) 200 MG/ML injection INJECT 0.5 ML INTRAMUSCULARLY EVERY 2 WEEKS    valsartan (DIOVAN) 320 MG tablet Take 1 tablet by mouth Daily.    [DISCONTINUED] escitalopram (LEXAPRO) 10 MG tablet Take 1 tablet by mouth Daily.     No current facility-administered medications on file prior to visit.         I have reviewed the patient's medical history in detail and updated the computerized patient record.  Review and summarization of old records include:    Past Medical History:   Diagnosis Date    Atherosclerotic heart disease of native coronary artery without angina pectoris     Benign essential hypertension     CKD (chronic kidney disease), stage III     Degeneration of cervical intervertebral disc     Depression with anxiety     ED (erectile dysfunction)     High cholesterol     Hypertension     MI (myocardial infarction)     Spinal stenosis     Syncope      Past Surgical History:   Procedure Laterality Date    BACK SURGERY      CATARACT EXTRACTION Bilateral     COLONOSCOPY N/A 2017    Procedure: COLONOSCOPY TO CECUM;  Surgeon: Emmanuel Peterson MD;  Location: [facility] ENDOSCOPY;  Service:     NECK SURGERY      TONSILLECTOMY       Social History     Occupational History    Not on file   Tobacco Use    Smoking status: Former     Types: Cigarettes     Quit date:      Years since quittin.0    Smokeless tobacco: Never    Tobacco comments:     daily caffeine use   Vaping Use    Vaping Use: Never used   Substance and Sexual Activity    Alcohol use: Yes     Comment: occasional use    Drug use: Yes     Types: Marijuana    Sexual activity: Defer      Social History     Social History Narrative    Not on file     Family History   Problem Relation Age of Onset    Cancer Mother         breast    Stroke Mother     Heart disease Father                ROS  Negative unless listed in the  HPI        Physical Exam  83 y.o. male  Body mass index is 27.62 kg/m²., 84.8 kg (187 lb)  Vitals:    01/05/24 1119   Resp: 20   SpO2: 97%     General: Alert, cooperative, appears well and in no observable distress.   HEENT: Normocephalic, atraumatic on external visual inspection. No icterus.   CV: No significant peripheral edema.   Respiratory: Normal respiratory effort.   Skin: Warm & well perfused; appropriate skin turgor.  Psych: Appropriate mood & affect.  Neuro: Gross sensation and motor intact in affected extremity/extremities.    Procedure:  Large Joint Arthrocentesis: R glenohumeral  Date/Time: 1/5/2024 11:52 AM  Consent given by: patient  Site marked: site marked  Timeout: Immediately prior to procedure a time out was called to verify the correct patient, procedure, equipment, support staff and site/side marked as required   Supporting Documentation  Indications: pain and diagnostic evaluation   Procedure Details  Location: shoulder - R glenohumeral  Needle size: 25 G  Approach: anterior  Medications administered: 2 mL lidocaine 1 %; 80 mg triamcinolone acetonide 40 MG/ML  Patient tolerance: patient tolerated the procedure well with no immediate complications      Large Joint Arthrocentesis: L glenohumeral  Date/Time: 1/5/2024 11:53 AM  Consent given by: patient  Site marked: site marked  Timeout: Immediately prior to procedure a time out was called to verify the correct patient, procedure, equipment, support staff and site/side marked as required   Supporting Documentation  Indications: pain and diagnostic evaluation   Procedure Details  Location: shoulder - L glenohumeral  Needle size: 25 G  Approach: anterior  Medications administered: 80 mg triamcinolone acetonide 40 MG/ML; 2 mL lidocaine 1 %  Patient tolerance: patient tolerated the procedure well with no immediate complications            Assessment:   Diagnoses and all orders for this visit:    1. Nontraumatic complete tear of left rotator cuff  (Primary)    2. Nontraumatic complete tear of right rotator cuff    Other orders  -     Large Joint Arthrocentesis  -     Large Joint Arthrocentesis      Body mass index is 27.62 kg/m².  BMI consistent with Overweight: 25.0-29.9kg/m2         Plan:   -     Risks and benefits of injection therapy discussed with patient.   Injected patient's bilateral shoulder joint(s)with Kenalog from an  anterior  approach   Rest, ice, compression, and elevation (RICE) therapy PRN  OTC Tylenol for pain PRN  BMI reviewed  Follow up in 3 months for repeat injections  Please call with questions or concerns in the interim        Date of encounter: 01/05/2024   CODEY Murphy

## 2024-04-17 ENCOUNTER — OFFICE VISIT (OUTPATIENT)
Dept: ORTHOPEDIC SURGERY | Facility: CLINIC | Age: 84
End: 2024-04-17
Payer: MEDICARE

## 2024-04-17 VITALS — RESPIRATION RATE: 20 BRPM | WEIGHT: 187 LBS | HEIGHT: 69 IN | BODY MASS INDEX: 27.7 KG/M2 | OXYGEN SATURATION: 97 %

## 2024-04-17 DIAGNOSIS — M19.012 PRIMARY OSTEOARTHRITIS OF LEFT SHOULDER: ICD-10-CM

## 2024-04-17 DIAGNOSIS — M25.511 CHRONIC RIGHT SHOULDER PAIN: ICD-10-CM

## 2024-04-17 DIAGNOSIS — M25.512 CHRONIC LEFT SHOULDER PAIN: Primary | ICD-10-CM

## 2024-04-17 DIAGNOSIS — G89.29 CHRONIC RIGHT SHOULDER PAIN: ICD-10-CM

## 2024-04-17 DIAGNOSIS — G89.29 CHRONIC LEFT SHOULDER PAIN: Primary | ICD-10-CM

## 2024-04-17 DIAGNOSIS — M19.011 PRIMARY OSTEOARTHRITIS OF RIGHT SHOULDER: ICD-10-CM

## 2024-04-17 RX ORDER — TRIAMCINOLONE ACETONIDE 40 MG/ML
80 INJECTION, SUSPENSION INTRA-ARTICULAR; INTRAMUSCULAR
Status: COMPLETED | OUTPATIENT
Start: 2024-04-17 | End: 2024-04-17

## 2024-04-17 RX ORDER — LIDOCAINE HYDROCHLORIDE 10 MG/ML
2 INJECTION, SOLUTION INFILTRATION; PERINEURAL
Status: COMPLETED | OUTPATIENT
Start: 2024-04-17 | End: 2024-04-17

## 2024-04-17 RX ADMIN — LIDOCAINE HYDROCHLORIDE 2 ML: 10 INJECTION, SOLUTION INFILTRATION; PERINEURAL at 10:14

## 2024-04-17 RX ADMIN — TRIAMCINOLONE ACETONIDE 80 MG: 40 INJECTION, SUSPENSION INTRA-ARTICULAR; INTRAMUSCULAR at 10:14

## 2024-04-17 NOTE — PROGRESS NOTES
INJECTION VISIT    Patient: Keith Hankins Jr.    YOB: 1940    MRN: [id number]    Chief Complaint   Patient presents with    Right Shoulder - Follow-up    Left Shoulder - Follow-up       History of Present Illness:   Keith Hankins Jr. is a 84 y.o. year old who presents today for injection of bilateral shoulders. Last injection received 3 months ago. Continues to provide good relief of symptoms.         Allergies:   Allergies   Allergen Reactions    Aspirin Nausea Only       Medications:   Home Medications:  Current Outpatient Medications on File Prior to Visit   Medication Sig    allopurinol (ZYLOPRIM) 100 MG tablet Take 1 tablet by mouth Daily.    aspirin EC (aspirin) 325 MG tablet Take 1 tablet by mouth Daily.    atorvastatin (LIPITOR) 40 MG tablet Take 1 tablet by mouth Daily.    azithromycin (ZITHROMAX) 250 MG tablet TAKE 2 TABLETS BY MOUTH ON DAY 1 AND 1 TABLET EACH DAY ON DAYS 2-5    buPROPion XL (WELLBUTRIN XL) 150 MG 24 hr tablet Take 1 tablet by mouth Every Morning.    escitalopram (LEXAPRO) 5 MG tablet Take 1 tablet by mouth Daily.    fluocinolone acetonide (DERMOTIC) 0.01 % oil otic oil fluocinolone acetonide oil 0.01 % ear drops    furosemide (LASIX) 20 MG tablet Take 1 tablet by mouth Daily.    HYDROcodone-acetaminophen (NORCO) 5-325 MG per tablet hydrocodone 5 mg-acetaminophen 325 mg tablet   TAKE 1 TABLET BY MOUTH EVERY DAY AS NEEDED    hydrocortisone 2.5 % cream hydrocortisone 2.5 % topical cream    ipratropium (ATROVENT) 0.06 % nasal spray SPRAY 2 SPRAYS INTO EACH NOSTRIL TWICE A DAY    ketoconazole (NIZORAL) 2 % cream ketoconazole 2 % topical cream    methylPREDNISolone (MEDROL) 4 MG dose pack TAKE 6 TABLETS BY MOUTH ON DAY ONE AND DECREASE BY 1 TABLET EACH DAY UNTIL GONE    Multiple Vitamins-Minerals (CENTRUM SILVER ULTRA MENS PO) Take 1 tablet by mouth Daily.    Omega-3 Fatty Acids (fish oil) 1000 MG capsule capsule Take 2 capsules by mouth.    omeprazole (prilOSEC) 10 MG capsule  Take 2 capsules by mouth.    tamsulosin (FLOMAX) 0.4 MG capsule 24 hr capsule Take 1 capsule by mouth Daily.    Testosterone Cypionate (DEPOTESTOTERONE CYPIONATE) 200 MG/ML injection INJECT 0.5 ML INTRAMUSCULARLY EVERY 2 WEEKS    valsartan (DIOVAN) 320 MG tablet Take 1 tablet by mouth Daily.     No current facility-administered medications on file prior to visit.         I have reviewed the patient's medical history in detail and updated the computerized patient record.  Review and summarization of old records include:    Past Medical History:   Diagnosis Date    Atherosclerotic heart disease of native coronary artery without angina pectoris     Benign essential hypertension     CKD (chronic kidney disease), stage III     Degeneration of cervical intervertebral disc     Depression with anxiety     ED (erectile dysfunction)     High cholesterol     Hypertension     MI (myocardial infarction)     Spinal stenosis     Syncope      Past Surgical History:   Procedure Laterality Date    BACK SURGERY      CATARACT EXTRACTION Bilateral     COLONOSCOPY N/A 2017    Procedure: COLONOSCOPY TO CECUM;  Surgeon: Emmanuel Peterson MD;  Location: [facility] ENDOSCOPY;  Service:     NECK SURGERY      TONSILLECTOMY       Social History     Occupational History    Not on file   Tobacco Use    Smoking status: Former     Current packs/day: 0.00     Types: Cigarettes     Quit date:      Years since quittin.3    Smokeless tobacco: Never    Tobacco comments:     daily caffeine use   Vaping Use    Vaping status: Never Used   Substance and Sexual Activity    Alcohol use: Yes     Comment: occasional use    Drug use: Yes     Types: Marijuana    Sexual activity: Defer      Social History     Social History Narrative    Not on file     Family History   Problem Relation Age of Onset    Cancer Mother         breast    Stroke Mother     Heart disease Father                ROS  Negative unless listed in the HPI        Physical Exam  84 y.o.  male  Body mass index is 27.62 kg/m²., 84.8 kg (187 lb)  Vitals:    04/17/24 0806   Resp: 20   SpO2: 97%     General: Alert, cooperative, appears well and in no observable distress.   HEENT: Normocephalic, atraumatic on external visual inspection. No icterus.   CV: No significant peripheral edema.   Respiratory: Normal respiratory effort.   Skin: Warm & well perfused; appropriate skin turgor.  Psych: Appropriate mood & affect.  Neuro: Gross sensation and motor intact in affected extremity/extremities.        Investigations:  X-Ray Report:  Bilateral shoulder(s) X-Ray  Indication: Evaluation of pain  AP, Lateral views  Findings: moderate OA changes noted. No acute injury. Overall stable since previous imaging.   Bony lesion: no  Soft tissues: within normal limits  Joint spaces: decreased  Hardware appropriately positioned: not applicable  Prior studies available for comparison: yes           Procedure:  Large Joint Arthrocentesis: R subacromial bursa  Date/Time: 4/17/2024 10:14 AM  Consent given by: patient  Site marked: site marked  Timeout: Immediately prior to procedure a time out was called to verify the correct patient, procedure, equipment, support staff and site/side marked as required   Supporting Documentation  Indications: pain and diagnostic evaluation   Procedure Details  Location: shoulder - R subacromial bursa  Needle size: 25 G  Approach: posterior  Medications administered: 2 mL lidocaine 1 %; 80 mg triamcinolone acetonide 40 MG/ML  Patient tolerance: patient tolerated the procedure well with no immediate complications      Large Joint Arthrocentesis: L subacromial bursa  Date/Time: 4/17/2024 10:14 AM  Consent given by: patient  Site marked: site marked  Timeout: Immediately prior to procedure a time out was called to verify the correct patient, procedure, equipment, support staff and site/side marked as required   Supporting Documentation  Indications: pain and diagnostic evaluation   Procedure  Details  Location: shoulder - L subacromial bursa  Needle size: 25 G  Approach: posterior  Medications administered: 2 mL lidocaine 1 %; 80 mg triamcinolone acetonide 40 MG/ML  Patient tolerance: patient tolerated the procedure well with no immediate complications            Assessment:   Diagnoses and all orders for this visit:    1. Chronic left shoulder pain (Primary)  -     XR Shoulder 2+ View Bilateral    2. Chronic right shoulder pain  -     XR Shoulder 2+ View Bilateral      Body mass index is 27.62 kg/m².  BMI consistent with Overweight: 25.0-29.9kg/m2         Plan:   -     Risks and benefits of injection therapy discussed with patient.    Injected patient's bilateral shoulder joint(s)with Kenalog from an posterior approach   Compression/brace   Rest, ice, compression, and elevation (RICE) therapy  OTC Tylenol for pain PRN  BMI reviewed  Follow up in 3 months for repeat injections  Please call with questions or concerns in the interim        Date of encounter: 04/17/2024   CODEY Murphy

## 2024-09-18 ENCOUNTER — OFFICE VISIT (OUTPATIENT)
Dept: ORTHOPEDIC SURGERY | Facility: CLINIC | Age: 84
End: 2024-09-18
Payer: MEDICARE

## 2024-09-18 VITALS — RESPIRATION RATE: 20 BRPM | OXYGEN SATURATION: 97 % | HEIGHT: 69 IN | BODY MASS INDEX: 27.7 KG/M2 | WEIGHT: 187 LBS

## 2024-09-18 DIAGNOSIS — M19.011 PRIMARY OSTEOARTHRITIS OF RIGHT SHOULDER: ICD-10-CM

## 2024-09-18 DIAGNOSIS — M19.012 PRIMARY OSTEOARTHRITIS OF LEFT SHOULDER: Primary | ICD-10-CM

## 2024-09-18 RX ORDER — TRIAMCINOLONE ACETONIDE 40 MG/ML
80 INJECTION, SUSPENSION INTRA-ARTICULAR; INTRAMUSCULAR
Status: COMPLETED | OUTPATIENT
Start: 2024-09-18 | End: 2024-09-18

## 2024-09-18 RX ORDER — LIDOCAINE HYDROCHLORIDE 10 MG/ML
2 INJECTION, SOLUTION INFILTRATION; PERINEURAL
Status: COMPLETED | OUTPATIENT
Start: 2024-09-18 | End: 2024-09-18

## 2024-09-18 RX ADMIN — TRIAMCINOLONE ACETONIDE 80 MG: 40 INJECTION, SUSPENSION INTRA-ARTICULAR; INTRAMUSCULAR at 09:25

## 2024-09-18 RX ADMIN — LIDOCAINE HYDROCHLORIDE 2 ML: 10 INJECTION, SOLUTION INFILTRATION; PERINEURAL at 09:25

## 2024-11-15 RX ORDER — VALSARTAN 320 MG/1
320 TABLET ORAL DAILY
Qty: 90 TABLET | Refills: 3 | Status: SHIPPED | OUTPATIENT
Start: 2024-11-15

## 2024-11-15 NOTE — TELEPHONE ENCOUNTER
Protocol Failed.     NOV - No visit scheduled.   LOV - 9/1/2023 ()      Diagnosis Plan    1. Coronary artery disease involving native coronary artery of native heart without angina pectoris          2. Primary hypertension                   SUMMARY/DISCUSSION  Coronary artery disease.  Stable no symptoms.  Hypertension clearly out-of-control he is willing take his medications.  I told him to start back with his valsartan.  He could take a half a pill for about a week then go up to 320 a day.  We will reassess him in about 4 to 6 weeks he is going to continue to follow his blood pressure at home.

## 2024-12-23 NOTE — PROGRESS NOTES
"Subjective   History of Present Illness: Keith Hankins Jr. is a 84 y.o. male is being seen for consultation today at the request of Clara Ann Pallares, MD for neck pain.  Patient was last seen for evaluation of neck pain in October 2020 and recall that he had an anterior cervical discectomy and fusion in the distant past by me.  Today, Mr. Hankins reports still presents pain when turning head.  He is uncertain exactly how long but apparently he was going in for routine maintenance treatment through [facility] in September and had facet injections from C3-C5 and felt that that his left-sided neck pain was aggravated.  A trigger point was offered a few weeks ago and he has not gotten any relief from that.  He has had to take hydrocodone because of the pain.  He states that he is able to sleep but when he has constant pain when awake.  He denies any arm pain.  He has had some tingling in the right hand but no true numbness or weakness in the arms.    History of Present Illness    Tobacco Use: Medium Risk (12/26/2024)    Patient History     Smoking Tobacco Use: Former     Smokeless Tobacco Use: Never     Passive Exposure: Not on file        The following portions of the patient's history were reviewed and updated as appropriate: allergies, current medications, past family history, past medical history, past social history, past surgical history and problem list.    Review of Systems   Musculoskeletal:  Positive for neck pain and neck stiffness.   All other systems reviewed and are negative.      Objective     Vitals:    12/26/24 1112   BP: 140/80   Pulse: 81   Temp: 97.3 °F (36.3 °C)   SpO2: 97%   Weight: 83.5 kg (184 lb)   Height: 175 cm (68.9\")     Body mass index is 27.25 kg/m².      Physical Exam  Neurological Exam    Physical Exam:    CONSTITUTIONAL: This 84 year old  male appears well developed, well-nourished and in no acute distress.    HEAD & FACE: the head and face are symmetric, " normocephalic and atraumatic.    EYES: Inspection of the conjunctivae and lids reveals no swelling, erythema or discharge.  Pupils are round, equal and reactive to light and there is no scleral icterus.    EARS, NOSE, MOUTH & THROAT: On inspection, the ears and nose are within normal limits.    NECK: He does not have much range of motion of the neck he can bend forward flexion extension he has some accelerated neck pain with extension.  There is almost no lateral bending certainly to the left and only a slight degree to the right and rotation is markedly limited as well.  The trachea is midline with no masses.      PULMONARY: Respiratory effort is normal with no increased work of breathing or signs of respiratory distress.    CARDIOVASCULAR:  Examination of the extremities shows no edema or varicosities.    MUSCULOSKELETAL: Gait and station are within normal limits. The spine has no tenderness in the midline.    SKIN: The skin is warm, dry and intact    NEUROLOGIC:   Cranial Nerves 2-12 intact  Normal motor strength noted. Muscle bulk and tone are normal.  Sensory exam is normal to fine touch to confrontational testing bilaterally  Reflexes on the right side demonstrates 1/4 Triceps Reflex, 1/4 Biceps Reflex, 1/4 Brachioradialis Reflex, 1/4 Knee Jerk Reflex, 1/4 Ankle Jerk Reflex and no ankle clonus on the right.   Reflexes on the left side demonstrates 1/4 Triceps Reflex, 1/4 Biceps Reflex, 1/4 Brachioradialis Reflex, 1/4 Knee Jerk Reflex, 1/4 Ankle Jerk Reflex and no ankle clonus on the left.  Superficial/Primitive Reflexes: primitive reflexes were absent.  Menendez's, Babinski, and Clonus signs all negative.  No coordination deficit observed.  Radicular testing showed a negative Spurling's maneuver  Cortical function is intact and without deficits. Speech is normal.    PSYCHIATRIC: oriented to person, place and time. Patient's mood and affect are normal.    Assessment & Plan   Independent Review of Radiographic  Studies:      I personally reviewed the images from the following studies.    MRI of the cervical spine done without contrast at [facility] on November 8, 2024 reveals discussed the phytocomplex similar to prior study done the year prior that contributes to some canal narrowing and bilateral neuroforaminal narrowing but without cord compression or signal change in the cord.  Anterior cervical discectomy and fusion present from C4-C6 with hardware.  Canal neuroforamina are patent at the operative levels.        Medical Decision Making:      Patient has acute on chronic neck pain and has responded well over the years to interventional pain management techniques.  He certainly has developed some adjacent level spondylosis at C3-C4 and there is canal stenosis but there is no myelopathy or specific radiculopathy to justify an operative intervention.  I am also afraid given his advanced age at C3-C4 if an anterior approach is used for discectomy and fusion it does not guarantee a reduction in his neck pain and it puts him at high risk for swallowing dysfunction.  He is gotten such good results out of the pain management techniques in the past I wonder whether he simply did not get the treatment applied in the perfect location with the last attempt or it has been a couple of years since he had epidural steroid injections that might be a better approach for this pattern of neck pain.  I will defer to pain management as to whether they try to repeat the facet injections again or try epidural steroid injections at this time.      Return for any new or recurrent neurosurgical problems.    Diagnoses and all orders for this visit:    1. Neck pain (Primary)  -     Ambulatory Referral to Pain Management Clinic    2. History of fusion of cervical spine  -     Ambulatory Referral to Pain Management Clinic    3. Cervical spondylosis without myelopathy  -     Ambulatory Referral to Pain Management Clinic             Jadiel BROWN  MD [facility]  Neurological Surgery

## 2024-12-26 ENCOUNTER — OFFICE VISIT (OUTPATIENT)
Dept: NEUROSURGERY | Facility: CLINIC | Age: 84
End: 2024-12-26
Payer: MEDICARE

## 2024-12-26 VITALS
WEIGHT: 184 LBS | TEMPERATURE: 97.3 F | SYSTOLIC BLOOD PRESSURE: 140 MMHG | OXYGEN SATURATION: 97 % | HEART RATE: 81 BPM | HEIGHT: 69 IN | BODY MASS INDEX: 27.25 KG/M2 | DIASTOLIC BLOOD PRESSURE: 80 MMHG

## 2024-12-26 DIAGNOSIS — Z98.1 HISTORY OF FUSION OF CERVICAL SPINE: ICD-10-CM

## 2024-12-26 DIAGNOSIS — M54.2 NECK PAIN: Primary | ICD-10-CM

## 2024-12-26 DIAGNOSIS — M47.812 CERVICAL SPONDYLOSIS WITHOUT MYELOPATHY: ICD-10-CM

## 2024-12-26 PROCEDURE — 3077F SYST BP >= 140 MM HG: CPT | Performed by: NEUROLOGICAL SURGERY

## 2024-12-26 PROCEDURE — 1160F RVW MEDS BY RX/DR IN RCRD: CPT | Performed by: NEUROLOGICAL SURGERY

## 2024-12-26 PROCEDURE — 3079F DIAST BP 80-89 MM HG: CPT | Performed by: NEUROLOGICAL SURGERY

## 2024-12-26 PROCEDURE — 99204 OFFICE O/P NEW MOD 45 MIN: CPT | Performed by: NEUROLOGICAL SURGERY

## 2024-12-26 PROCEDURE — 1159F MED LIST DOCD IN RCRD: CPT | Performed by: NEUROLOGICAL SURGERY

## 2024-12-31 ENCOUNTER — OFFICE VISIT (OUTPATIENT)
Dept: ORTHOPEDIC SURGERY | Facility: CLINIC | Age: 84
End: 2024-12-31
Payer: MEDICARE

## 2024-12-31 VITALS — WEIGHT: 184 LBS | HEIGHT: 68 IN | RESPIRATION RATE: 20 BRPM | OXYGEN SATURATION: 97 % | BODY MASS INDEX: 27.89 KG/M2

## 2024-12-31 DIAGNOSIS — M19.012 PRIMARY OSTEOARTHRITIS OF LEFT SHOULDER: Primary | ICD-10-CM

## 2024-12-31 DIAGNOSIS — M19.011 PRIMARY OSTEOARTHRITIS OF RIGHT SHOULDER: ICD-10-CM

## 2024-12-31 RX ORDER — TRIAMCINOLONE ACETONIDE 40 MG/ML
80 INJECTION, SUSPENSION INTRA-ARTICULAR; INTRAMUSCULAR
Status: COMPLETED | OUTPATIENT
Start: 2024-12-31 | End: 2024-12-31

## 2024-12-31 RX ORDER — AMLODIPINE BESYLATE 5 MG/1
1 TABLET ORAL DAILY
COMMUNITY
Start: 2024-11-04

## 2024-12-31 RX ORDER — LIDOCAINE HYDROCHLORIDE 10 MG/ML
2 INJECTION, SOLUTION INFILTRATION; PERINEURAL
Status: COMPLETED | OUTPATIENT
Start: 2024-12-31 | End: 2024-12-31

## 2024-12-31 RX ADMIN — LIDOCAINE HYDROCHLORIDE 2 ML: 10 INJECTION, SOLUTION INFILTRATION; PERINEURAL at 14:32

## 2024-12-31 RX ADMIN — LIDOCAINE HYDROCHLORIDE 2 ML: 10 INJECTION, SOLUTION INFILTRATION; PERINEURAL at 14:33

## 2024-12-31 RX ADMIN — TRIAMCINOLONE ACETONIDE 80 MG: 40 INJECTION, SUSPENSION INTRA-ARTICULAR; INTRAMUSCULAR at 14:33

## 2024-12-31 RX ADMIN — TRIAMCINOLONE ACETONIDE 80 MG: 40 INJECTION, SUSPENSION INTRA-ARTICULAR; INTRAMUSCULAR at 14:32

## 2024-12-31 NOTE — PROGRESS NOTES
INJECTION VISIT    Patient: Keith Hankins Jr.    YOB: 1940    MRN: [id number]    Chief Complaint   Patient presents with    Right Shoulder - Follow-up    Left Shoulder - Follow-up       History of Present Illness:   Keith Hankins Jr. is a 84 y.o. year old who presents today for injection of bilateral shoulders. Last injection was 3 months ago.         Allergies:   Allergies   Allergen Reactions    Aspirin Nausea Only       Medications:   Home Medications:  Current Outpatient Medications on File Prior to Visit   Medication Sig    allopurinol (ZYLOPRIM) 100 MG tablet Take 1 tablet by mouth Daily.    amLODIPine (NORVASC) 5 MG tablet Take 1 tablet by mouth Daily.    aspirin EC (aspirin) 325 MG tablet Take 1 tablet by mouth Daily.    atorvastatin (LIPITOR) 40 MG tablet Take 1 tablet by mouth Daily.    azithromycin (ZITHROMAX) 250 MG tablet TAKE 2 TABLETS BY MOUTH ON DAY 1 AND 1 TABLET EACH DAY ON DAYS 2-5    buPROPion XL (WELLBUTRIN XL) 150 MG 24 hr tablet Take 1 tablet by mouth Every Morning.    escitalopram (LEXAPRO) 5 MG tablet Take 1 tablet by mouth Daily.    furosemide (LASIX) 20 MG tablet Take 1 tablet by mouth Daily.    HYDROcodone-acetaminophen (NORCO) 5-325 MG per tablet hydrocodone 5 mg-acetaminophen 325 mg tablet   TAKE 1 TABLET BY MOUTH EVERY DAY AS NEEDED    hydrocortisone 2.5 % cream hydrocortisone 2.5 % topical cream    ipratropium (ATROVENT) 0.06 % nasal spray SPRAY 2 SPRAYS INTO EACH NOSTRIL TWICE A DAY    ketoconazole (NIZORAL) 2 % cream ketoconazole 2 % topical cream    methylPREDNISolone (MEDROL) 4 MG dose pack TAKE 6 TABLETS BY MOUTH ON DAY ONE AND DECREASE BY 1 TABLET EACH DAY UNTIL GONE    Multiple Vitamins-Minerals (CENTRUM SILVER ULTRA MENS PO) Take 1 tablet by mouth Daily.    Omega-3 Fatty Acids (fish oil) 1000 MG capsule capsule Take 2 capsules by mouth.    omeprazole (prilOSEC) 10 MG capsule Take 2 capsules by mouth.    tamsulosin (FLOMAX) 0.4 MG capsule 24 hr capsule Take 1  capsule by mouth Daily.    Testosterone Cypionate (DEPOTESTOTERONE CYPIONATE) 200 MG/ML injection INJECT 0.5 ML INTRAMUSCULARLY EVERY 2 WEEKS    fluocinolone acetonide (DERMOTIC) 0.01 % oil otic oil fluocinolone acetonide oil 0.01 % ear drops (Patient not taking: Reported on 2024)    valsartan (DIOVAN) 320 MG tablet TAKE 1 TABLET BY MOUTH EVERY DAY (Patient not taking: Reported on 2024)     No current facility-administered medications on file prior to visit.         I have reviewed the patient's medical history in detail and updated the computerized patient record.  Review and summarization of old records include:    Past Medical History:   Diagnosis Date    Atherosclerotic heart disease of native coronary artery without angina pectoris     Benign essential hypertension     CKD (chronic kidney disease), stage III     Degeneration of cervical intervertebral disc     Depression with anxiety     ED (erectile dysfunction)     High cholesterol     Hypertension     MI (myocardial infarction)     Spinal stenosis     Syncope      Past Surgical History:   Procedure Laterality Date    BACK SURGERY      CATARACT EXTRACTION Bilateral     COLONOSCOPY N/A 2017    Procedure: COLONOSCOPY TO CECUM;  Surgeon: Emmanuel Peterson MD;  Location: [facility] ENDOSCOPY;  Service:     NECK SURGERY      TONSILLECTOMY       Social History     Occupational History    Not on file   Tobacco Use    Smoking status: Former     Current packs/day: 0.00     Types: Cigarettes     Quit date: 1971     Years since quittin.0    Smokeless tobacco: Never    Tobacco comments:     daily caffeine use   Vaping Use    Vaping status: Never Used   Substance and Sexual Activity    Alcohol use: Yes     Comment: occasional use    Drug use: Yes     Types: Marijuana    Sexual activity: Defer      Social History     Social History Narrative    Not on file     Family History   Problem Relation Age of Onset    Cancer Mother         breast    Stroke Mother      Heart disease Father                ROS  Negative unless listed in the HPI        Physical Exam  84 y.o. male  Body mass index is 27.98 kg/m²., 83.5 kg (184 lb)  Vitals:    12/31/24 1408   Resp: 20   SpO2: 97%     General: Alert, cooperative, appears well and in no observable distress.   HEENT: Normocephalic, atraumatic on external visual inspection. No icterus.   CV: No significant peripheral edema.   Respiratory: Normal respiratory effort.   Skin: Warm & well perfused; appropriate skin turgor.  Psych: Appropriate mood & affect.  Neuro: Gross sensation and motor intact in affected extremity/extremities.  Vascular: Peripheral pulses palpable in affected extremity/extremities.         Procedure:  Large Joint Arthrocentesis: R subacromial bursa  Date/Time: 12/31/2024 2:32 PM  Consent given by: patient  Site marked: site marked  Timeout: Immediately prior to procedure a time out was called to verify the correct patient, procedure, equipment, support staff and site/side marked as required   Supporting Documentation  Indications: pain   Procedure Details  Location: shoulder - R subacromial bursa  Needle size: 25 G  Approach: posterior  Medications administered: 2 mL lidocaine 1 %; 80 mg triamcinolone acetonide 40 MG/ML  Patient tolerance: patient tolerated the procedure well with no immediate complications      Large Joint Arthrocentesis: L knee  Date/Time: 12/31/2024 2:33 PM  Consent given by: patient  Site marked: site marked  Timeout: Immediately prior to procedure a time out was called to verify the correct patient, procedure, equipment, support staff and site/side marked as required   Supporting Documentation  Indications: pain and diagnostic evaluation   Procedure Details  Location: knee - L knee  Needle size: 25 G  Approach: posterior  Medications administered: 2 mL lidocaine 1 %; 80 mg triamcinolone acetonide 40 MG/ML  Patient tolerance: patient tolerated the procedure well with no immediate  complications            Assessment:   Diagnoses and all orders for this visit:    1. Primary osteoarthritis of left shoulder (Primary)    2. Primary osteoarthritis of right shoulder    Other orders  -     Large Joint Arthrocentesis  -     Large Joint Arthrocentesis      Body mass index is 27.98 kg/m².  BMI consistent with Overweight: 25.0-29.9kg/m2         Plan:   -     Risks and benefits of injection therapy discussed with patient.  Possibility of bruising, pain, swelling, infection, increased blood sugar levels, cortisol flare and soft tissue atrophy discussed in detail.  Injected patient's bilateral shoulder joint(s)with Kenalog from an posterior approach   Compression/brace   Rest, ice, compression, and elevation (RICE) therapy  OTC Tylenol for pain PRN  Follow up in 3 months with repeat injections   Please call with questions or concerns in the interim        Date of encounter: 12/31/2024   CODEY Murphy

## 2025-01-30 ENCOUNTER — TELEPHONE (OUTPATIENT)
Dept: ORTHOPEDIC SURGERY | Facility: CLINIC | Age: 85
End: 2025-01-30

## 2025-01-30 NOTE — TELEPHONE ENCOUNTER
Provider: CONCHITA RIOS     Caller: Keith Hankins Jr.    Relationship to Patient: Self    Phone Number: [phone]    Reason for Call: HE IS HAVING A LOT OF CONSISTENT RIGHT SHOULDER PAIN AND HAS LIMITED USE. HE WOULD LIKE TO KNOW IF THERE IS A STRONGER INJECTION SHE CAN GIVE HIM OR ANY OTHER ADVICE TO HELP WITH THE PAIN. HE IS ALSO ASKING IF THE OFFICE HAS SLINGS OR IS THERE SOMEWHERE HE CAN PURCHASE ONE TO USE FOR A MONTH OR SO TO HELP THE SHOULDER HEAL. PLEASE CALL TO DISCUSS. NO VOICEMAIL, OKAY TO TEXT IF NEEDED.    When was the patient last seen: 12-31-24

## 2025-01-31 NOTE — TELEPHONE ENCOUNTER
Left voicemail to tell pt not to wear a sling and that no other injections are available but Yaima ALEGRE would like him seen by Gael Bradshaw PA-C to talk about further evaluation of his shoulders from now on.

## 2025-02-18 ENCOUNTER — OFFICE VISIT (OUTPATIENT)
Dept: ORTHOPEDIC SURGERY | Facility: CLINIC | Age: 85
End: 2025-02-18
Payer: MEDICARE

## 2025-02-18 VITALS — OXYGEN SATURATION: 95 % | BODY MASS INDEX: 27.43 KG/M2 | HEART RATE: 89 BPM | WEIGHT: 181 LBS | HEIGHT: 68 IN

## 2025-02-18 DIAGNOSIS — G89.29 CHRONIC LEFT SHOULDER PAIN: ICD-10-CM

## 2025-02-18 DIAGNOSIS — M25.512 CHRONIC LEFT SHOULDER PAIN: ICD-10-CM

## 2025-02-18 DIAGNOSIS — M25.511 CHRONIC RIGHT SHOULDER PAIN: ICD-10-CM

## 2025-02-18 DIAGNOSIS — M19.012 PRIMARY OSTEOARTHRITIS OF LEFT SHOULDER: Primary | ICD-10-CM

## 2025-02-18 DIAGNOSIS — M19.011 PRIMARY OSTEOARTHRITIS OF RIGHT SHOULDER: ICD-10-CM

## 2025-02-18 DIAGNOSIS — G89.29 CHRONIC RIGHT SHOULDER PAIN: ICD-10-CM

## 2025-02-18 NOTE — PROGRESS NOTES
Keith is a 84 y.o. year old male presents to [facility]    Chief Complaint   Patient presents with    Right Shoulder - Initial Evaluation    Left Shoulder - Initial Evaluation       History of Present Illness  Keith Hankins Jr. is a 84 y.o. male presents   History of Present Illness  The patient is an 84-year-old male presenting with chronic bilateral shoulder pain, with the right shoulder greater than the left. He was previously evaluated by Dr. Raymnudo and treated conservatively.  Since Dr. Raymundo leaving he has been under the care of CODEY Cohen. Approximately 3 to 4 years ago, he experienced a popping sensation in his right shoulder while lifting weights at [facility]. Despite this incident, he did not seek immediate medical attention. When he did consult with Dr. Raymundo, surgical intervention was not recommended due to his living situation and the requirement for a sling post-surgery, minimum 6 weeks. Instead, he opted for injections, which provided relief.  His last subacromial injection was on 12/31/2024 to bilateral shoulders.  However, two months ago, he experienced another popping sensation in his shoulder while adjusting his belt, leading to significant discomfort over the past few months. He has been using a homemade sling for support and reports difficulty reaching into cabinets due to the pain. His last injection was administered on New Year's Denise, but it has not been as effective as previous ones. He is left-handed and also suffers from a pinched nerve in his neck, for which he is receiving treatment at [facility]. He has been advised against further injections or surgery due to his age. He has been receiving injections in both shoulders for several years and occasionally experiences radiating pain down his arm. He has been using lidocaine for pain management and is currently on Ecotrin 325 mg daily following a heart attack  "20 years ago, during which a stent was placed.    Supplemental Information  He has been taking some hydrocodone for his neck.    MEDICATIONS  Current: Ecotrin, lidocaine, hydrocodone    I have reviewed the patient's medical, family, and social history in detail and updated the computerized patient record.    Objective:  Pulse 89   Ht 172.7 cm (68\")   Wt 82.1 kg (181 lb)   SpO2 95%   BMI 27.52 kg/m²      Physical Exam    Vital signs reviewed.   General: No acute distress.  Eyes: conjunctiva clear  ENT: external ears atraumatic  CV: no peripheral edema  Resp: normal respiratory effort  Skin: no rashes or wounds; normal turgor  Psych: mood and affect appropriate; recent and remote memory intact  Neuro: sensation to light touch intact    MSK Exam    Physical Exam  Right radial pulse is 2+. Capillary refill is less than 2 seconds to all digits.  strength is 5/5 bilaterally. Right wrist and elbow have grossly intact range of motion. Passive forward flexion of the right shoulder is 140+. Abduction is 135+. Passive external rotation is 55. Active internal rotation is L1. Positive Speed test. Belly press is 5/5. Lift off is 2/5 with pain. Negative Baer test. Positive scarf test. Significant pain and weakness with Burleigh's supraspinatus/Silvana test. Left radial pulse is 1+.     Left shoulder:  Capillary refill is less than 2 seconds to all digits. Range of motion at the left wrist and elbow is grossly intact. Tenderness over the left bicipital groove. Passive forward flexion of the left shoulder is 140. Passive abduction is 125+. Passive external rotation is 55. Active internal rotation of left shoulder is L2. Belly press is 5/5. Lift off is 5/5. Negative Speed test on the left shoulder. Negative Baer test. Negative scarf test. No pain or weakness with Burleigh's and supraspinatus/Silvana test.    Imaging:  XR Shoulder 2+ View Bilateral (04/17/2024 08:19)   Findings:  Right shoulder: 3 views of the right shoulder " demonstrate normal alignment with no fracture or dislocation. There is mild glenohumeral joint space narrowing. Hypertrophic degenerative changes of the acromioclavicular joint. The soft tissues are   unremarkable.     Left shoulder: 3 views of the left shoulder demonstrate normal alignment with no fracture or dislocation. There is mild glenohumeral joint space narrowing. Hypertrophic degenerative changes of the acromioclavicular joint. The soft tissues are   unremarkable.     IMPRESSION:    Degenerative changes of the shoulders. No acute osseous abnormality.       Assessment:  Diagnoses and all orders for this visit:    Primary osteoarthritis of left shoulder    Primary osteoarthritis of right shoulder    Chronic left shoulder pain    Chronic right shoulder pain    Plan:   Assessment & Plan  1. Chronic bilateral shoulder pain.  The patient presents with chronic bilateral shoulder pain, with the right shoulder being more severely affected. He has a history of conservative treatment, including injections, which have provided temporary relief. Recent imaging shows mild arthritis, a bone spur, and calcific changes in the rotator cuff tendons. Physical examination reveals tenderness and positive tests indicative of a chronic rotator cuff tendon tear. He is advised to avoid activities that require muscle engagement and to perform passive shoulder movements to prevent stiffness. Topical creams and physical therapy are recommended. Voltaren gel (diclofenac 1%) is suggested for topical application 1-3 times daily. If symptoms persist, an MRI or incorporating physical therapy, may be considered to further evaluate the extent of the tear.    Follow-up  The patient will follow up in a few months for a shoulder recheck.        Patient or patient representative verbalized consent for the use of Ambient Listening during the visit with  Gael Bradshaw PA-C for chart documentation. 2/18/2025  13:05 EST      Follow Up   Return  in about 7 weeks (around 4/7/2025) for shoulder Recheck.  Patient was given instructions and counseling regarding his condition or for health maintenance advice. Please see specific information pulled into the AVS if appropriate.     EMR Dragon/Transcription disclaimer:    Much of this encounter note is an electronic transcription/translation of spoken language to printed text.  The electronic translation of spoken language may permit erroneous, or at times, nonsensical words or phrases to be inadvertently transcribed.  Although I have reviewed the note for such errors some may still exist.

## 2025-02-19 ENCOUNTER — PATIENT ROUNDING (BHMG ONLY) (OUTPATIENT)
Dept: ORTHOPEDIC SURGERY | Facility: CLINIC | Age: 85
End: 2025-02-19
Payer: MEDICARE

## 2025-04-01 ENCOUNTER — OFFICE VISIT (OUTPATIENT)
Dept: ORTHOPEDIC SURGERY | Facility: CLINIC | Age: 85
End: 2025-04-01
Payer: MEDICARE

## 2025-04-01 VITALS — BODY MASS INDEX: 27.43 KG/M2 | HEIGHT: 68 IN | RESPIRATION RATE: 18 BRPM | WEIGHT: 181 LBS

## 2025-04-01 DIAGNOSIS — M25.512 CHRONIC LEFT SHOULDER PAIN: ICD-10-CM

## 2025-04-01 DIAGNOSIS — M19.011 PRIMARY OSTEOARTHRITIS OF RIGHT SHOULDER: ICD-10-CM

## 2025-04-01 DIAGNOSIS — G89.29 CHRONIC LEFT SHOULDER PAIN: ICD-10-CM

## 2025-04-01 DIAGNOSIS — G89.29 CHRONIC RIGHT SHOULDER PAIN: ICD-10-CM

## 2025-04-01 DIAGNOSIS — M25.511 CHRONIC RIGHT SHOULDER PAIN: ICD-10-CM

## 2025-04-01 DIAGNOSIS — M19.012 PRIMARY OSTEOARTHRITIS OF LEFT SHOULDER: Primary | ICD-10-CM

## 2025-04-01 RX ADMIN — LIDOCAINE HYDROCHLORIDE 2 ML: 10 INJECTION, SOLUTION EPIDURAL; INFILTRATION; INTRACAUDAL; PERINEURAL at 13:55

## 2025-04-01 RX ADMIN — TRIAMCINOLONE ACETONIDE 80 MG: 40 INJECTION, SUSPENSION INTRA-ARTICULAR; INTRAMUSCULAR at 13:56

## 2025-04-01 RX ADMIN — LIDOCAINE HYDROCHLORIDE 2 ML: 10 INJECTION, SOLUTION EPIDURAL; INFILTRATION; INTRACAUDAL; PERINEURAL at 13:56

## 2025-04-01 RX ADMIN — TRIAMCINOLONE ACETONIDE 80 MG: 40 INJECTION, SUSPENSION INTRA-ARTICULAR; INTRAMUSCULAR at 13:55

## 2025-04-01 NOTE — PROGRESS NOTES
"   Patient ID: Keith Hankins Jr. is a 85 y.o. male  History of Present Illness  The patient is an 85-year-old male who presents for evaluation of bilateral shoulder pain.    He reports that his right shoulder pain is more severe than the left, although the left shoulder discomfort is manageable. He has been receiving injections from Dr. Aquino, which have provided significant relief. However, he experienced a popping sensation in his right shoulder while adjusting his belt, which was followed by a downward radiating pain in his arm. Despite not observing any bruising, he has been cautious with his movements, particularly when raising his arm or holding it close to his head. He has not experienced a recurrence of the popping sensation since the initial incident in December 2024. He has been diligent in avoiding re-injury to his shoulder.    Supplemental Information  He has not had any changes in his medications since 02/18/2025. He is taking hydrocodone for his neck, which has a pinched nerve. He goes to [facility]. The shot helps, but it has been bad for quite a while. He thinks they are going to do a more serious shot or an ablation. He has had trigger point injections in his neck by Dr. Caitlin Lieberman.    MEDICATIONS  Hydrocodone    Objective:  Resp 18   Ht 172.7 cm (68\")   Wt 82.1 kg (181 lb)   BMI 27.52 kg/m²     Physical Examination:       Physical Exam  Right upper extremity shows a 1+ radial pulse. Capillary refill is less than 1 second to all digits. Left radial pulse is palpable and also 1+. Capillary refill is less than 1 second to all digits. Sensory neuro is intact in all distributions.  strength is 5/5 bilaterally. Range of motion at the right digits, right wrist, and right elbow are grossly intact, with the elbow possibly lacking about 3 degrees of extension. Right shoulder demonstrates intact skin, possibly moderate atrophy most notably along posterior shoulder, no effusion, no signs of " infection, and no warmth. There is tenderness over the mid substance of the upper trapezius and tenderness over the posterior joint line. Passive forward flexion of the right shoulder is 140 with mild pain, stiffness and crepitus; abduction is 145; passive external rotation is 60; active internal rotation is L1. Belly press is 5/5 and pain free. Belly lift off is 3/5 with pain. Positive Speed test. Significant pain and weakness with supraspinatus/Silvana on the right.     Left finger range of motion, left wrist and left elbow are grossly intact. There is tenderness over the anterior joint line. Passive forward flexion is 150+; passive abduction is 145+; passive external rotation is 60; active internal rotation is L5. Belly press is 5/5. Lift off is 5/5. Negative Speed test. No pain or weakness with [place] and supraspinatus/Silvana. Negative Baer, negative scarf.      Imaging:   XR Shoulder 2+ View Bilateral (04/17/2024 08:19)   Findings:  Right shoulder: 3 views of the right shoulder demonstrate normal alignment with no fracture or dislocation. There is mild glenohumeral joint space narrowing. Hypertrophic degenerative changes of the acromioclavicular joint. The soft tissues are   unremarkable.     Left shoulder: 3 views of the left shoulder demonstrate normal alignment with no fracture or dislocation. There is mild glenohumeral joint space narrowing. Hypertrophic degenerative changes of the acromioclavicular joint. The soft tissues are unremarkable.     IMPRESSION:    Degenerative changes of the shoulders. No acute osseous abnormality.       Assessment:    Diagnoses and all orders for this visit:    1. Primary osteoarthritis of left shoulder (Primary)    2. Primary osteoarthritis of right shoulder    3. Chronic left shoulder pain  -     Large Joint Arthrocentesis    4. Chronic right shoulder pain  -     Large Joint Arthrocentesis    Plan:   Assessment & Plan  1. Bilateral shoulder pain.  The patient reports that the  right shoulder pain is worse than the left, but he can almost live with the left shoulder pain. He experienced significant relief from previous injections, except for one instance where he felt a popping sensation while tightening his belt in December 2024. Physical examination reveals tenderness over the mid-substance of the upper trapezius and posterior joint line, with mild atrophy and no signs of infection. Passive forward flexion of the right shoulder is 140 degrees with mild pain, stiffness, and crepitus. Abduction is 145 degrees, passive external rotation is 60 degrees, and active internal rotation is to L1. There is significant pain and weakness with supraspinatus/Silvana on the right. The patient will receive a steroid injection in both shoulders today, of which the risks and benefits were both discussed. Patient wishes to proceed. Will discuss advanced imaging at a later visit.    PROCEDURE  The patient received a steroid injection in both shoulders today.    Large Joint Arthrocentesis: R subacromial bursa  Date/Time: 4/1/2025 1:55 PM  Consent given by: patient  Site marked: site marked  Timeout: Immediately prior to procedure a time out was called to verify the correct patient, procedure, equipment, support staff and site/side marked as required   Supporting Documentation  Indications: pain   Procedure Details  Location: shoulder - R subacromial bursa  Preparation: Patient was prepped and draped in the usual sterile fashion  Needle size: 25 G  Approach: posterior  Medications administered: 80 mg triamcinolone acetonide 40 MG/ML; 2 mL lidocaine PF 1% 1 %  Patient tolerance: patient tolerated the procedure well with no immediate complications      Large Joint Arthrocentesis: L subacromial bursa  Date/Time: 4/1/2025 1:56 PM  Consent given by: patient  Site marked: site marked  Timeout: Immediately prior to procedure a time out was called to verify the correct patient, procedure, equipment, support staff and  site/side marked as required   Supporting Documentation  Indications: pain   Procedure Details  Location: shoulder - L subacromial bursa  Preparation: Patient was prepped and draped in the usual sterile fashion  Needle size: 25 G  Approach: posterior  Medications administered: 80 mg triamcinolone acetonide 40 MG/ML; 2 mL lidocaine PF 1% 1 %  Patient tolerance: patient tolerated the procedure well with no immediate complications        Patient or patient representative verbalized consent for the use of Ambient Listening during the visit with  Gael Bradshaw PA-C for chart documentation. 4/2/2025  14:07 EDT  Disclaimer: Part of this note may be an electronic transcription/translation of spoken language to printed text using the Dragon Dictation System

## 2025-04-02 RX ORDER — LIDOCAINE HYDROCHLORIDE 10 MG/ML
2 INJECTION, SOLUTION EPIDURAL; INFILTRATION; INTRACAUDAL; PERINEURAL
Status: COMPLETED | OUTPATIENT
Start: 2025-04-01 | End: 2025-04-01

## 2025-04-02 RX ORDER — TRIAMCINOLONE ACETONIDE 40 MG/ML
80 INJECTION, SUSPENSION INTRA-ARTICULAR; INTRAMUSCULAR
Status: COMPLETED | OUTPATIENT
Start: 2025-04-01 | End: 2025-04-01

## 2025-04-25 NOTE — PROGRESS NOTES
RM:________     PCP: Pallares, Clara Ann, MD                                     Last EKG :  2023    : 1940  AGE: 85 y.o.    REASON FOR VISIT: __________________________________________    ____________________________________________________________                                                       Wt Readings from Last 3 Encounters:   25 82.1 kg (181 lb)   25 82.1 kg (181 lb)   24 83.5 kg (184 lb)      BP Readings from Last 3 Encounters:   24 140/80   23 176/100   23 138/78          WT: ____________ HT: ______ BP: __________ HR ______   02% _______

## 2025-04-28 ENCOUNTER — OFFICE VISIT (OUTPATIENT)
Dept: CARDIOLOGY | Age: 85
End: 2025-04-28
Payer: MEDICARE

## 2025-04-28 VITALS
WEIGHT: 182.4 LBS | DIASTOLIC BLOOD PRESSURE: 76 MMHG | BODY MASS INDEX: 27.65 KG/M2 | OXYGEN SATURATION: 96 % | HEIGHT: 68 IN | SYSTOLIC BLOOD PRESSURE: 160 MMHG | HEART RATE: 70 BPM

## 2025-04-28 DIAGNOSIS — I25.10 CORONARY ARTERY DISEASE INVOLVING NATIVE CORONARY ARTERY OF NATIVE HEART WITHOUT ANGINA PECTORIS: Primary | ICD-10-CM

## 2025-04-28 DIAGNOSIS — I10 PRIMARY HYPERTENSION: ICD-10-CM

## 2025-04-28 PROCEDURE — 1160F RVW MEDS BY RX/DR IN RCRD: CPT | Performed by: NURSE PRACTITIONER

## 2025-04-28 PROCEDURE — 3078F DIAST BP <80 MM HG: CPT | Performed by: NURSE PRACTITIONER

## 2025-04-28 PROCEDURE — 93000 ELECTROCARDIOGRAM COMPLETE: CPT | Performed by: NURSE PRACTITIONER

## 2025-04-28 PROCEDURE — 99214 OFFICE O/P EST MOD 30 MIN: CPT | Performed by: NURSE PRACTITIONER

## 2025-04-28 PROCEDURE — 3077F SYST BP >= 140 MM HG: CPT | Performed by: NURSE PRACTITIONER

## 2025-04-28 PROCEDURE — 1159F MED LIST DOCD IN RCRD: CPT | Performed by: NURSE PRACTITIONER

## 2025-04-28 RX ORDER — ASPIRIN 81 MG/1
81 TABLET ORAL DAILY
Start: 2025-04-28

## 2025-04-28 NOTE — ASSESSMENT & PLAN NOTE
Denies dyspnea  Patient on a full-strength aspirin, recommended to transition to baby aspirin secondary to bruising to the upper extremities  Continue atorvastatin

## 2025-04-28 NOTE — PROGRESS NOTES
"    CARDIOLOGY        Patient Name: Keith Hankins Jr.  :1940  Age: 85 y.o.  Primary Cardiologist: Sean Thompson MD  Encounter Provider:  CODEY Fields    Date of Service: 2025      CHIEF COMPLAINT / REASON FOR OFFICE VISIT     Follow-up (Overdue/) and Hypertension        HPI  Keith Hankins Jr. is a 85 y.o. male who presents today for overdue follow-up.  Patient last evaluated in clinic in .    Pt has a  history significant for HTN.    Patient reports that he has not taken his medications today.  He admits that he lays his pills out to take in the morning and that in the evening he still notes the pills sitting on the counter.  He notes some bruising to his upper extremities.  He is active and still works in his yard.  He does have some generalized fatigue.  He is otherwise asymptomatic and denies any episodes of chest pain, shortness of breath, palpitations, lightheadedness, swelling or fatigue.      HISTORY OF PRESENT ILLNESS     Stress echocardiogram 2020.  LVEF 71%.  All LV wall segments contract normally.  Grade 1 diastolic dysfunction.  Trace tricuspid valve regurgitation.  Normal stress echocardiogram without significant evidence for myocardial ischemia.    Patient was evaluated by Dr. Thompson in clinic 2023.  At that time blood pressure was elevated and medication adjustments were made.  Patient was recommended to follow-up in 6 weeks however appointment was rescheduled.      The following portions of the patient's history were reviewed and updated as appropriate: allergies, current medications, past family history, past medical history, past social history, past surgical history and problem list.      VITAL SIGNS     Visit Vitals  /76 (BP Location: Left arm, Patient Position: Sitting, Cuff Size: Adult)   Pulse 70   Ht 172.7 cm (68\")   Wt 82.7 kg (182 lb 6.4 oz)   SpO2 96%   BMI 27.73 kg/m²         Wt Readings from Last 3 Encounters:   25 82.7 kg (182 " lb 6.4 oz)   04/01/25 82.1 kg (181 lb)   02/18/25 82.1 kg (181 lb)     Body mass index is 27.73 kg/m².      REVIEW OF SYSTEMS     Review of Systems   Constitutional: Positive for malaise/fatigue. Negative for chills, fever, weight gain and weight loss.   Cardiovascular:  Negative for leg swelling.   Respiratory:  Negative for cough, snoring and wheezing.    Hematologic/Lymphatic: Negative for bleeding problem. Does not bruise/bleed easily.   Skin:  Negative for color change.   Musculoskeletal:  Negative for falls, joint pain and myalgias.   Gastrointestinal:  Negative for melena.   Genitourinary:  Negative for hematuria.   Neurological:  Negative for excessive daytime sleepiness.   Psychiatric/Behavioral:  Negative for depression. The patient is not nervous/anxious.            PHYSICAL EXAMINATION     Constitutional:       Appearance: Normal appearance. Well-developed.   Eyes:      Conjunctiva/sclera: Conjunctivae normal.   Neck:      Vascular: No carotid bruit.   Pulmonary:      Effort: Pulmonary effort is normal.      Breath sounds: Normal breath sounds.   Cardiovascular:      Normal rate. Regular rhythm. Normal S1. Normal S2.       Murmurs: There is no murmur.      No gallop.  No click. No rub.   Edema:     Peripheral edema absent.   Musculoskeletal: Normal range of motion. Skin:     General: Skin is warm and dry.   Neurological:      Mental Status: Alert and oriented to person, place, and time.      GCS: GCS eye subscore is 4. GCS verbal subscore is 5. GCS motor subscore is 6.   Psychiatric:         Speech: Speech normal.         Behavior: Behavior normal.         Thought Content: Thought content normal.         Judgment: Judgment normal.           REVIEWED DATA       ECG 12 Lead    Date/Time: 4/28/2025 2:17 PM  Performed by: Whit Cunha APRN    Authorized by: Whit Cunha APRN  Comparison: compared with previous ECG from 7/14/2023  Rhythm: sinus rhythm  Ectopy: atrial premature contractions  Rate:  "normal  BPM: 70  Conduction: conduction normal  Conduction: left anterior fascicular block  ST Segments: ST segments normal  T Waves: T waves normal  QRS axis: left  Other findings: non-specific ST-T wave changes    Clinical impression: non-specific ECG              Lab Results   Component Value Date     10/01/2017    K 4.8 10/01/2017     10/01/2017    CO2 23.3 10/01/2017    BUN 25 (H) 10/01/2017    CREATININE 1.30 (H) 10/01/2017    EGFRIFNONA 54 (L) 10/01/2017    GLUCOSE 93 10/01/2017    CALCIUM 9.3 10/01/2017    ALBUMIN 4.20 10/01/2017    BILITOT 0.4 10/01/2017    AST 21 10/01/2017    ALT 25 10/01/2017     Lab Results   Component Value Date    WBC 8.87 10/01/2017    HGB 14.8 10/01/2017    HCT 45.6 10/01/2017    .2 (H) 10/01/2017     10/01/2017     No results found for: \"PROBNP\", \"BNP\"  No results found for: \"CKTOTAL\", \"CKMB\", \"CKMBINDEX\", \"TROPONINI\", \"TROPONINT\"  No results found for: \"TSH\"      ASSESSMENT & PLAN     Diagnoses and all orders for this visit:    1. Coronary artery disease involving native coronary artery of native heart without angina pectoris (Primary)  -     ECG 12 Lead    2. Primary hypertension    Other orders  -     aspirin 81 MG EC tablet; Take 1 tablet by mouth Daily.        Return in about 6 weeks (around 6/9/2025) for CODEY Rey.    Future Appointments         Provider Department Center    6/9/2025 10:40 AM Whit Cunha APRN [facility] CARDIOLOGY [facility]              MEDICATIONS         Discharge Medications            Accurate as of April 28, 2025  3:28 PM. If you have any questions, ask your nurse or doctor.                Changes to Medications        Instructions Start Date   aspirin 81 MG EC tablet  What changed:   medication strength  how much to take  Changed by: CODEY Rey   81 mg, Oral, Daily             Continue These Medications        Instructions Start Date   allopurinol 100 MG tablet  Commonly known as: " ZYLOPRIM   100 mg, Daily      amLODIPine 5 MG tablet  Commonly known as: NORVASC   1 tablet, Daily      atorvastatin 40 MG tablet  Commonly known as: LIPITOR   40 mg, Daily      buPROPion  MG 24 hr tablet  Commonly known as: WELLBUTRIN XL   150 mg, Every Morning      escitalopram 5 MG tablet  Commonly known as: LEXAPRO   1 tablet, Daily      fish oil 1000 MG capsule capsule   2 g      furosemide 20 MG tablet  Commonly known as: LASIX   20 mg, Daily      HYDROcodone-acetaminophen 5-325 MG per tablet  Commonly known as: NORCO   hydrocodone 5 mg-acetaminophen 325 mg tablet   TAKE 1 TABLET BY MOUTH EVERY DAY AS NEEDED      multivitamin with minerals tablet tablet   1 tablet, Daily      omeprazole 10 MG capsule  Commonly known as: prilOSEC   20 mg      tamsulosin 0.4 MG capsule 24 hr capsule  Commonly known as: FLOMAX   1 capsule, Daily      Testosterone Cypionate 200 MG/ML injection  Commonly known as: DEPOTESTOTERONE CYPIONATE   INJECT 0.5 ML INTRAMUSCULARLY EVERY 2 WEEKS      valsartan 320 MG tablet  Commonly known as: DIOVAN   320 mg, Oral, Daily                   **Dragon Disclaimer:   Much of this encounter note is an electronic transcription/translation of spoken language to printed text. The electronic translation of spoken language may permit erroneous, or at times, nonsensical words or phrases to be inadvertently transcribed. Although I have reviewed the note for such errors, some may still exist.

## 2025-04-28 NOTE — ASSESSMENT & PLAN NOTE
Hypertension is uncontrolled /76  Continue current treatment regimen.  Dietary sodium restriction.  Regular aerobic exercise.  Ambulatory blood pressure monitoring.  Blood pressure will be reassessed in 6 weeks .    Patient reports that he has not taken any of his antihypertensive medications this morning.  I do question routine compliance at home.  He states that he oftentimes will lay his pills out in the morning to take and then in the evening they are still laying on the counter.  Instructed patient to please get into a routine of taking his medications at the same time every day.  Patient reports that he will try to do better with compliance.  Follow-up in 6 weeks.

## 2025-05-03 PROBLEM — N18.30 CHRONIC KIDNEY DISEASE (CKD), STAGE III (MODERATE): Status: ACTIVE | Noted: 2025-05-03

## 2025-05-03 PROBLEM — F41.9 ANXIETY AND DEPRESSION: Status: ACTIVE | Noted: 2025-05-03

## 2025-05-03 PROBLEM — I25.10 CAD IN NATIVE ARTERY: Status: ACTIVE | Noted: 2025-05-03

## 2025-05-03 PROBLEM — M47.812 CERVICAL SPONDYLOSIS: Status: ACTIVE | Noted: 2022-12-20

## 2025-05-03 PROBLEM — I10 BP (HIGH BLOOD PRESSURE): Status: ACTIVE | Noted: 2025-05-03

## 2025-05-03 PROBLEM — F32.A ANXIETY AND DEPRESSION: Status: ACTIVE | Noted: 2025-05-03

## 2025-05-03 PROBLEM — M79.18 MYOFASCIAL PAIN: Status: ACTIVE | Noted: 2023-06-29

## 2025-05-03 PROBLEM — M50.30 DDD (DEGENERATIVE DISC DISEASE), CERVICAL: Status: ACTIVE | Noted: 2025-05-03

## 2025-05-03 PROBLEM — E66.3 OVERWEIGHT: Status: ACTIVE | Noted: 2024-04-04

## 2025-05-03 PROBLEM — G89.29 CHRONIC PAIN: Status: ACTIVE | Noted: 2024-08-22

## 2025-05-03 PROBLEM — F52.21 ED (ERECTILE DYSFUNCTION) OF NON-ORGANIC ORIGIN: Status: ACTIVE | Noted: 2025-05-03

## 2025-05-03 PROBLEM — M54.12 CERVICAL RADICULOPATHY: Status: ACTIVE | Noted: 2025-05-03

## 2025-06-04 ENCOUNTER — TELEPHONE (OUTPATIENT)
Dept: CARDIOLOGY | Age: 85
End: 2025-06-04
Payer: MEDICARE

## 2025-06-04 NOTE — TELEPHONE ENCOUNTER
I called Sparkle back but there was no answer.  I left her a detailed VM that we'd get back to her sometime next week.    Stefany SUAREZ RN  Triage [facility]  06/04/25 14:42 EDT

## 2025-06-04 NOTE — TELEPHONE ENCOUNTER
06/04/25  13:43 EDT    Please let them know that I will have to discuss this with Dr. Thompson when he returns next week?      CODEY Rey  [facility]

## 2025-06-04 NOTE — TELEPHONE ENCOUNTER
CODEY Villagran with [facility] called. She's wanting to prescribe ketamine infusions to treat this pt's depression but was seeking clearance due to his h/o MI.   Do you have any recommendations for this patient?    Her callback: [phone]  Her fax: [phone]    Stefany SUAREZ RN  [facility]  06/04/25  13:07 EDT

## 2025-06-09 ENCOUNTER — OFFICE VISIT (OUTPATIENT)
Dept: CARDIOLOGY | Age: 85
End: 2025-06-09
Payer: MEDICARE

## 2025-06-09 VITALS
OXYGEN SATURATION: 95 % | DIASTOLIC BLOOD PRESSURE: 76 MMHG | HEART RATE: 98 BPM | SYSTOLIC BLOOD PRESSURE: 132 MMHG | WEIGHT: 178.9 LBS | HEIGHT: 68 IN | BODY MASS INDEX: 27.11 KG/M2

## 2025-06-09 DIAGNOSIS — Z01.810 PRE-OPERATIVE CARDIOVASCULAR EXAMINATION: ICD-10-CM

## 2025-06-09 DIAGNOSIS — I10 PRIMARY HYPERTENSION: ICD-10-CM

## 2025-06-09 DIAGNOSIS — I25.10 CORONARY ARTERY DISEASE INVOLVING NATIVE CORONARY ARTERY OF NATIVE HEART WITHOUT ANGINA PECTORIS: Primary | ICD-10-CM

## 2025-06-09 NOTE — ASSESSMENT & PLAN NOTE
Denies just Gina or angina  Continue baby aspirin and atorvastatin  Repeat stress echocardiogram, it has been greater than 5 years since last assessment.

## 2025-06-09 NOTE — PROGRESS NOTES
RM:________     PCP: Pallares, Clara Ann, MD                                     Last EKG :  2025    : 1940  AGE: 85 y.o.    REASON FOR VISIT: __________________________________________    ____________________________________________________________                                                       Wt Readings from Last 3 Encounters:   25 81.6 kg (180 lb)   25 82.7 kg (182 lb 6.4 oz)   25 82.1 kg (181 lb)      BP Readings from Last 3 Encounters:   25 170/87   25 160/76   24 140/80          WT: ____________ HT: ______ BP: __________ HR ______   02% _______

## 2025-06-09 NOTE — ASSESSMENT & PLAN NOTE
Patient is wanting preoperative cardiovascular risk assessment for ketamine infusions for treatment of depression.  It has been sometime since patient has had an ischemic evaluation, he has known history of MI.  Recommend repeating stress echocardiogram.  If this is normal then he will be low risk for ketamine infusions.

## 2025-06-09 NOTE — PROGRESS NOTES
"    CARDIOLOGY        Patient Name: Keith Hankins Jr.  :1940  Age: 85 y.o.  Primary Cardiologist: Sean Thompson MD  Encounter Provider:  CODEY Fields    Date of Service: 2025      CHIEF COMPLAINT / REASON FOR OFFICE VISIT     Follow-up (6 week ) and Hypertension        HPI  Keith Hankins Jr. is a 85 y.o. male who presents today for 6-week follow-up.    Patient was evaluated by me in clinic 2025.  At that time patient admitted that he was not completely compliant with his antihypertensive regimen.  His blood pressure was elevated.  Encouraged compliance of medications and to reevaluate.    Patient reports that he has been more compliant with his antihypertensive regimen.  He states that he has been monitoring his blood pressure at home and it is in the 120s when he wakes in the morning.  He is currently asymptomatic.  He is questioning if he can receive ketamine infusions for treatment of depression.  Currently denies any episodes of chest pain, shortness of breath, palpitations, lightheadedness, swelling or fatigue.        HISTORY OF PRESENT ILLNESS     Stress echocardiogram 2020.  LVEF 71%.  All LV wall segments contract normally.  Grade 1 diastolic dysfunction.  Trace tricuspid valve regurgitation.  Normal stress echocardiogram without significant evidence for myocardial ischemia.    Patient was evaluated by Dr. Thompson in clinic 2023.  At that time blood pressure was elevated and medication adjustments were made.  Patient was recommended to follow-up in 6 weeks however appointment was rescheduled.      The following portions of the patient's history were reviewed and updated as appropriate: allergies, current medications, past family history, past medical history, past social history, past surgical history and problem list.      VITAL SIGNS     Visit Vitals  /76 (BP Location: Left arm, Patient Position: Sitting, Cuff Size: Adult)   Pulse 98   Ht 172.7 cm (68\") "   Wt 81.1 kg (178 lb 14.4 oz)   SpO2 95%   BMI 27.20 kg/m²         Wt Readings from Last 3 Encounters:   06/09/25 81.1 kg (178 lb 14.4 oz)   05/03/25 81.6 kg (180 lb)   04/28/25 82.7 kg (182 lb 6.4 oz)     Body mass index is 27.2 kg/m².      REVIEW OF SYSTEMS     Review of Systems   Constitutional: Negative for chills, fever, malaise/fatigue, weight gain and weight loss.   Cardiovascular:  Negative for leg swelling.   Respiratory:  Negative for cough, snoring and wheezing.    Hematologic/Lymphatic: Negative for bleeding problem. Does not bruise/bleed easily.   Skin:  Negative for color change.   Musculoskeletal:  Negative for falls, joint pain and myalgias.   Gastrointestinal:  Negative for melena.   Genitourinary:  Negative for hematuria.   Neurological:  Negative for excessive daytime sleepiness.   Psychiatric/Behavioral:  Negative for depression. The patient is not nervous/anxious.            PHYSICAL EXAMINATION     Constitutional:       Appearance: Normal appearance. Well-developed.   Eyes:      Conjunctiva/sclera: Conjunctivae normal.   Neck:      Vascular: No carotid bruit.   Pulmonary:      Effort: Pulmonary effort is normal.      Breath sounds: Normal breath sounds.   Cardiovascular:      Normal rate. Regular rhythm. Normal S1. Normal S2.       Murmurs: There is no murmur.      No gallop.  No click. No rub.   Edema:     Peripheral edema absent.   Musculoskeletal: Normal range of motion. Skin:     General: Skin is warm and dry.   Neurological:      Mental Status: Alert and oriented to person, place, and time.      GCS: GCS eye subscore is 4. GCS verbal subscore is 5. GCS motor subscore is 6.   Psychiatric:         Speech: Speech normal.         Behavior: Behavior normal.         Thought Content: Thought content normal.         Judgment: Judgment normal.           REVIEWED DATA     Procedures        Lab Results   Component Value Date     10/01/2017    K 4.8 10/01/2017     10/01/2017    CO2 23.3  "10/01/2017    BUN 25 (H) 10/01/2017    CREATININE 1.30 (H) 10/01/2017    EGFRIFNONA 54 (L) 10/01/2017    GLUCOSE 93 10/01/2017    CALCIUM 9.3 10/01/2017    ALBUMIN 4.20 10/01/2017    BILITOT 0.4 10/01/2017    AST 21 10/01/2017    ALT 25 10/01/2017     Lab Results   Component Value Date    WBC 8.87 10/01/2017    HGB 14.8 10/01/2017    HCT 45.6 10/01/2017    .2 (H) 10/01/2017     10/01/2017     No results found for: \"PROBNP\", \"BNP\"  No results found for: \"CKTOTAL\", \"CKMB\", \"CKMBINDEX\", \"TROPONINI\", \"TROPONINT\"  No results found for: \"TSH\"      ASSESSMENT & PLAN     Diagnoses and all orders for this visit:    1. Coronary artery disease involving native coronary artery of native heart without angina pectoris (Primary)  Overview:  Stress echocardiogram 6/11/2020.  LVEF 71%.  All LV wall segments contract normally.  Grade 1 diastolic dysfunction.  Trace tricuspid valve regurgitation.  Normal stress echocardiogram without significant evidence for myocardial ischemia.    Assessment & Plan:  Denies just Gina or angina  Continue baby aspirin and atorvastatin  Repeat stress echocardiogram, it has been greater than 5 years since last assessment.    Orders:  -     Adult Stress Echo W/ Cont or Stress Agent if Necessary Per Protocol; Future    2. Pre-operative cardiovascular examination  Assessment & Plan:  Patient is wanting preoperative cardiovascular risk assessment for ketamine infusions for treatment of depression.  It has been sometime since patient has had an ischemic evaluation, he has known history of MI.  Recommend repeating stress echocardiogram.  If this is normal then he will be low risk for ketamine infusions.    Orders:  -     Adult Stress Echo W/ Cont or Stress Agent if Necessary Per Protocol; Future    3. Primary hypertension  Overview:  Amlodipine 5 mg/day  Furosemide 20 mg/day  Valsartan 320 mg/day    Assessment & Plan:  Hypertension is stable and controlled  Continue current treatment " regimen.  Dietary sodium restriction.  Regular aerobic exercise.  Ambulatory blood pressure monitoring.  Blood pressure will be reassessed in 1 year.            Return in about 1 year (around 6/9/2026) for Dr. Thompson- Routine.    Future Appointments         Provider Department Center    6/23/2025 10:00 AM (Arrive by 9:30 AM) [facility] E[facility] OUTPATIENT ECHOCARDIOGRAPHY [facility]    6/10/2026 11:40 AM (Arrive by 11:25 AM) Sean Thompson MD [facility] CARDIOLOGY [facility]                MEDICATIONS         Discharge Medications            Accurate as of June 9, 2025  1:08 PM. If you have any questions, ask your nurse or doctor.                Continue These Medications        Instructions Start Date   allopurinol 100 MG tablet  Commonly known as: ZYLOPRIM   100 mg, Daily      amLODIPine 5 MG tablet  Commonly known as: NORVASC   1 tablet, Daily      aspirin 81 MG EC tablet   81 mg, Oral, Daily      atorvastatin 40 MG tablet  Commonly known as: LIPITOR   40 mg, Daily      buPROPion  MG 24 hr tablet  Commonly known as: WELLBUTRIN XL   150 mg, Every Morning      escitalopram 5 MG tablet  Commonly known as: LEXAPRO   1 tablet, Daily      fish oil 1000 MG capsule capsule   2 g      furosemide 20 MG tablet  Commonly known as: LASIX   20 mg, Daily      HYDROcodone-acetaminophen 5-325 MG per tablet  Commonly known as: NORCO   hydrocodone 5 mg-acetaminophen 325 mg tablet   TAKE 1 TABLET BY MOUTH EVERY DAY AS NEEDED      multivitamin with minerals tablet tablet   1 tablet, Daily      omeprazole 10 MG capsule  Commonly known as: prilOSEC   20 mg      tamsulosin 0.4 MG capsule 24 hr capsule  Commonly known as: FLOMAX   1 capsule, Daily      Testosterone Cypionate 200 MG/ML injection  Commonly known as: DEPOTESTOTERONE CYPIONATE   INJECT 0.5 ML INTRAMUSCULARLY EVERY 2 WEEKS      valsartan 320 MG tablet  Commonly known as: DIOVAN   320 mg, Oral, Daily                   **Kathy Disclaimer:    Much of this encounter note is an electronic transcription/translation of spoken language to printed text. The electronic translation of spoken language may permit erroneous, or at times, nonsensical words or phrases to be inadvertently transcribed. Although I have reviewed the note for such errors, some may still exist.

## 2025-06-20 ENCOUNTER — TELEPHONE (OUTPATIENT)
Dept: CARDIOLOGY | Age: 85
End: 2025-06-20
Payer: MEDICARE

## 2025-06-23 ENCOUNTER — HOSPITAL ENCOUNTER (OUTPATIENT)
Dept: CARDIOLOGY | Facility: HOSPITAL | Age: 85
Discharge: HOME | End: 2025-06-23
Admitting: NURSE PRACTITIONER
Payer: MEDICARE

## 2025-06-23 VITALS
HEIGHT: 68 IN | HEART RATE: 80 BPM | DIASTOLIC BLOOD PRESSURE: 90 MMHG | WEIGHT: 178 LBS | SYSTOLIC BLOOD PRESSURE: 110 MMHG | BODY MASS INDEX: 26.98 KG/M2

## 2025-06-23 DIAGNOSIS — Z01.810 PRE-OPERATIVE CARDIOVASCULAR EXAMINATION: ICD-10-CM

## 2025-06-23 DIAGNOSIS — I25.10 CORONARY ARTERY DISEASE INVOLVING NATIVE CORONARY ARTERY OF NATIVE HEART WITHOUT ANGINA PECTORIS: ICD-10-CM

## 2025-06-23 LAB
AORTIC DIMENSIONLESS INDEX: 0.85 (DI)
ASCENDING AORTA: 3.5 CM
AV MEAN PRESS GRAD SYS DOP V1V2: 3 MMHG
AV VMAX SYS DOP: 111 CM/SEC
BH CV ECHO MEAS - ACS: 1.78 CM
BH CV ECHO MEAS - AI P1/2T: 583.5 MSEC
BH CV ECHO MEAS - AO MAX PG: 4.9 MMHG
BH CV ECHO MEAS - AO ROOT AREA (BSA CORRECTED): 1.7 CM2
BH CV ECHO MEAS - AO ROOT DIAM: 3.4 CM
BH CV ECHO MEAS - AO V2 VTI: 20.2 CM
BH CV ECHO MEAS - AVA(I,D): 2.7 CM2
BH CV ECHO MEAS - EDV(CUBED): 59.3 ML
BH CV ECHO MEAS - EDV(MOD-SP4): 81 ML
BH CV ECHO MEAS - EF(MOD-SP4): 72.8 %
BH CV ECHO MEAS - ESV(CUBED): 10 ML
BH CV ECHO MEAS - ESV(MOD-SP4): 22 ML
BH CV ECHO MEAS - FS: 44.7 %
BH CV ECHO MEAS - IVS/LVPW: 1.33 CM
BH CV ECHO MEAS - IVSD: 1.2 CM
BH CV ECHO MEAS - LAT PEAK E' VEL: 7.5 CM/SEC
BH CV ECHO MEAS - LV DIASTOLIC VOL/BSA (35-75): 41.6 CM2
BH CV ECHO MEAS - LV MASS(C)D: 131 GRAMS
BH CV ECHO MEAS - LV MAX PG: 4.2 MMHG
BH CV ECHO MEAS - LV MEAN PG: 2 MMHG
BH CV ECHO MEAS - LV SYSTOLIC VOL/BSA (12-30): 11.3 CM2
BH CV ECHO MEAS - LV V1 MAX: 102 CM/SEC
BH CV ECHO MEAS - LV V1 VTI: 17.1 CM
BH CV ECHO MEAS - LVIDD: 3.9 CM
BH CV ECHO MEAS - LVIDS: 2.16 CM
BH CV ECHO MEAS - LVOT AREA: 3.2 CM2
BH CV ECHO MEAS - LVOT DIAM: 2.02 CM
BH CV ECHO MEAS - LVPWD: 0.9 CM
BH CV ECHO MEAS - MED PEAK E' VEL: 6.2 CM/SEC
BH CV ECHO MEAS - MR MAX PG: 25.2 MMHG
BH CV ECHO MEAS - MR MAX VEL: 251.1 CM/SEC
BH CV ECHO MEAS - MV A DUR: 0.13 SEC
BH CV ECHO MEAS - MV A MAX VEL: 88.1 CM/SEC
BH CV ECHO MEAS - MV DEC SLOPE: 277.1 CM/SEC2
BH CV ECHO MEAS - MV DEC TIME: 0.24 SEC
BH CV ECHO MEAS - MV E MAX VEL: 52 CM/SEC
BH CV ECHO MEAS - MV E/A: 0.59
BH CV ECHO MEAS - MV MAX PG: 4.1 MMHG
BH CV ECHO MEAS - MV MEAN PG: 1.15 MMHG
BH CV ECHO MEAS - MV P1/2T: 52.9 MSEC
BH CV ECHO MEAS - MV V2 VTI: 19.5 CM
BH CV ECHO MEAS - MVA(P1/2T): 4.2 CM2
BH CV ECHO MEAS - MVA(VTI): 2.8 CM2
BH CV ECHO MEAS - PA V2 MAX: 95 CM/SEC
BH CV ECHO MEAS - PULM A REVS DUR: 0.12 SEC
BH CV ECHO MEAS - PULM A REVS VEL: 35.5 CM/SEC
BH CV ECHO MEAS - PULM DIAS VEL: 24.3 CM/SEC
BH CV ECHO MEAS - PULM S/D: 1.54
BH CV ECHO MEAS - PULM SYS VEL: 37.4 CM/SEC
BH CV ECHO MEAS - QP/QS: 0.64
BH CV ECHO MEAS - RAP SYSTOLE: 3 MMHG
BH CV ECHO MEAS - RV MAX PG: 2.09 MMHG
BH CV ECHO MEAS - RV V1 MAX: 72.2 CM/SEC
BH CV ECHO MEAS - RV V1 VTI: 11.3 CM
BH CV ECHO MEAS - RVOT DIAM: 1.99 CM
BH CV ECHO MEAS - RVSP: 24.3 MMHG
BH CV ECHO MEAS - SV(LVOT): 54.8 ML
BH CV ECHO MEAS - SV(MOD-SP4): 59 ML
BH CV ECHO MEAS - SV(RVOT): 35.1 ML
BH CV ECHO MEAS - SVI(LVOT): 28.2 ML/M2
BH CV ECHO MEAS - SVI(MOD-SP4): 30.3 ML/M2
BH CV ECHO MEAS - TAPSE (>1.6): 1.98 CM
BH CV ECHO MEAS - TR MAX PG: 21.3 MMHG
BH CV ECHO MEAS - TR MAX VEL: 230.8 CM/SEC
BH CV ECHO MEASUREMENTS AVERAGE E/E' RATIO: 7.59
BH CV STRESS BP STAGE 1: NORMAL
BH CV STRESS BP STAGE 2: NORMAL
BH CV STRESS DURATION MIN STAGE 1: 3
BH CV STRESS DURATION MIN STAGE 2: 2
BH CV STRESS DURATION SEC STAGE 1: 0
BH CV STRESS DURATION SEC STAGE 2: 0
BH CV STRESS ECHO POST STRESS EJECTION FRACTION EF: 73 %
BH CV STRESS GRADE STAGE 1: 10
BH CV STRESS GRADE STAGE 2: 12
BH CV STRESS HR STAGE 1: 120
BH CV STRESS HR STAGE 2: 138
BH CV STRESS METS STAGE 1: 5
BH CV STRESS METS STAGE 2: 7
BH CV STRESS PROTOCOL 1: NORMAL
BH CV STRESS RECOVERY HR: 98 BPM
BH CV STRESS SPEED STAGE 1: 1.7
BH CV STRESS SPEED STAGE 2: 2.5
BH CV STRESS STAGE 1: 1
BH CV STRESS STAGE 2: 2
BH CV XLRA - RV BASE: 3.5 CM
BH CV XLRA - RV LENGTH: 6.6 CM
BH CV XLRA - RV MID: 2.41 CM
BH CV XLRA - TDI S': 11.7 CM/SEC
LEFT ATRIUM VOLUME INDEX: 23.3 ML/M2
MAXIMAL PREDICTED HEART RATE: 136 BPM
PERCENT MAX PREDICTED HR: 101.47 %
SINUS: 3.2 CM
STJ: 3 CM
STRESS BASELINE BP: NORMAL MMHG
STRESS BASELINE HR: 88 BPM
STRESS PERCENT HR: 119 %
STRESS POST ESTIMATED WORKLOAD: 7 METS
STRESS POST EXERCISE DUR MIN: 5 MIN
STRESS POST EXERCISE DUR SEC: 0 SEC
STRESS POST PEAK BP: NORMAL MMHG
STRESS POST PEAK HR: 138 BPM
STRESS TARGET HR: 116 BPM

## 2025-06-23 PROCEDURE — 93325 DOPPLER ECHO COLOR FLOW MAPG: CPT | Performed by: INTERNAL MEDICINE

## 2025-06-23 PROCEDURE — 93350 STRESS TTE ONLY: CPT | Performed by: INTERNAL MEDICINE

## 2025-06-23 PROCEDURE — 93016 CV STRESS TEST SUPVJ ONLY: CPT | Performed by: INTERNAL MEDICINE

## 2025-06-23 PROCEDURE — 25510000001 PERFLUTREN 6.52 MG/ML SUSPENSION 2 ML VIAL: Performed by: NURSE PRACTITIONER

## 2025-06-23 PROCEDURE — 93320 DOPPLER ECHO COMPLETE: CPT | Performed by: INTERNAL MEDICINE

## 2025-06-23 PROCEDURE — 93350 STRESS TTE ONLY: CPT

## 2025-06-23 PROCEDURE — 93352 ADMIN ECG CONTRAST AGENT: CPT | Performed by: INTERNAL MEDICINE

## 2025-06-23 PROCEDURE — 93017 CV STRESS TEST TRACING ONLY: CPT

## 2025-06-23 PROCEDURE — 93018 CV STRESS TEST I&R ONLY: CPT | Performed by: INTERNAL MEDICINE

## 2025-06-23 PROCEDURE — 93325 DOPPLER ECHO COLOR FLOW MAPG: CPT

## 2025-06-23 PROCEDURE — 93320 DOPPLER ECHO COMPLETE: CPT

## 2025-06-23 RX ADMIN — PERFLUTREN 3 ML: 6.52 INJECTION, SUSPENSION INTRAVENOUS at 10:23

## 2025-07-15 ENCOUNTER — TRANSCRIBE ORDERS (OUTPATIENT)
Dept: ADMINISTRATIVE | Facility: HOSPITAL | Age: 85
End: 2025-07-15
Payer: MEDICARE

## 2025-07-15 DIAGNOSIS — M79.661 RIGHT CALF PAIN: Primary | ICD-10-CM

## 2025-07-28 ENCOUNTER — OFFICE VISIT (OUTPATIENT)
Dept: ORTHOPEDIC SURGERY | Facility: CLINIC | Age: 85
End: 2025-07-28
Payer: MEDICARE

## 2025-07-28 VITALS — HEART RATE: 90 BPM | BODY MASS INDEX: 27.74 KG/M2 | WEIGHT: 183 LBS | HEIGHT: 68 IN

## 2025-07-28 DIAGNOSIS — M25.511 ACUTE PAIN OF BOTH SHOULDERS: Primary | ICD-10-CM

## 2025-07-28 DIAGNOSIS — M25.512 ACUTE PAIN OF BOTH SHOULDERS: Primary | ICD-10-CM

## 2025-07-28 PROCEDURE — 99213 OFFICE O/P EST LOW 20 MIN: CPT | Performed by: ORTHOPAEDIC SURGERY

## 2025-07-28 PROCEDURE — 20610 DRAIN/INJ JOINT/BURSA W/O US: CPT | Performed by: ORTHOPAEDIC SURGERY

## 2025-07-28 RX ORDER — TRIAMCINOLONE ACETONIDE 40 MG/ML
80 INJECTION, SUSPENSION INTRA-ARTICULAR; INTRAMUSCULAR ONCE
Status: COMPLETED | OUTPATIENT
Start: 2025-07-28 | End: 2025-07-28

## 2025-07-28 RX ADMIN — TRIAMCINOLONE ACETONIDE 80 MG: 40 INJECTION, SUSPENSION INTRA-ARTICULAR; INTRAMUSCULAR at 15:06

## 2025-07-28 NOTE — PROGRESS NOTES
Patient ID: Keith Hankins Jr. is a 85 y.o. male.    Chief Complaint:    Chief Complaint   Patient presents with    Right Shoulder - Initial Evaluation, Pain     PAIN 8     Left Shoulder - Initial Evaluation, Pain     PAIN 2        HPI:  This is an 85-year-old gentleman with chronic bilateral shoulder pain.  He is at repeat injections in both shoulders with good results the last couple years.  He has pain worse on the right than the left worse with overhead activity no prior shoulder surgery  Past Medical History:   Diagnosis Date    Atherosclerotic heart disease of native coronary artery without angina pectoris     Benign essential hypertension     CKD (chronic kidney disease), stage III     Degeneration of cervical intervertebral disc     Depression with anxiety     ED (erectile dysfunction)     High cholesterol     Hypertension     MI (myocardial infarction)     Spinal stenosis     Syncope        Past Surgical History:   Procedure Laterality Date    BACK SURGERY      CATARACT EXTRACTION Bilateral     COLONOSCOPY N/A 2017    Procedure: COLONOSCOPY TO CECUM;  Surgeon: Emmanuel Peterson MD;  Location: [facility] ENDOSCOPY;  Service:     NECK SURGERY      TONSILLECTOMY         Family History   Problem Relation Age of Onset    Cancer Mother         breast    Stroke Mother     Heart disease Father           Social History     Occupational History    Not on file   Tobacco Use    Smoking status: Former     Current packs/day: 0.00     Types: Cigarettes     Quit date:      Years since quittin.6     Passive exposure: Past    Smokeless tobacco: Never    Tobacco comments:     daily caffeine use   Vaping Use    Vaping status: Never Used   Substance and Sexual Activity    Alcohol use: Yes     Comment: occasional use    Drug use: Yes     Types: Marijuana    Sexual activity: Defer      Review of Systems   Cardiovascular:  Negative for chest pain.   Musculoskeletal:  Positive for arthralgias.  "      Objective:    Pulse 90   Ht 172.7 cm (68\")   Wt 83 kg (183 lb)   BMI 27.83 kg/m²     Physical Examination:    Both shoulders are examined has mild supraspinatus atrophy on the right active elevation of the right is 110 on the left 170 he has pain and weakness on Speed Zapata supraspinatus testing on the right mild pain but no weakness on the left.  Belly press left off are  4/5 and 3/5 in 5/5 on the left  Sensory and motor exam are intact all distributions. Radial pulse is palpable and capillary refill is less than two seconds to all digits.  Imaging:  bilateral Shoulder X-Ray  Indication: Bilateral shoulder pain  AP Y and Lateral views  Findings: Well-maintained joint spaces mild loss of acromiohumeral distance on the right  no bony lesion  Soft tissues normal  normal joint spaces  Hardware appropriately positioned not applicable      yes prior studies available for comparison.    Assessment:  Bilateral shoulder pain    Plan:  Further options involving surgery on the right versus conservative treatment were discussed,  I recommend injection after todays evaluation.  Risks and benefits were discussed. Under sterile technique and after timeout and verbal consent I injected 40 mg of Kenalog and 1 mL of 1% Lidocaine plain into the subacromial space bilateral. It was well tolerated.      Procedures         Disclaimer: Part of this note may be an electronic transcription/translation of spoken language to printed text using the Dragon Dictation System  "

## 2025-07-30 ENCOUNTER — PATIENT ROUNDING (BHMG ONLY) (OUTPATIENT)
Dept: ORTHOPEDIC SURGERY | Facility: CLINIC | Age: 85
End: 2025-07-30
Payer: MEDICARE

## 2025-08-04 ENCOUNTER — TELEPHONE (OUTPATIENT)
Dept: NEUROSURGERY | Facility: CLINIC | Age: 85
End: 2025-08-04
Payer: MEDICARE

## 2025-08-26 ENCOUNTER — HOSPITAL ENCOUNTER (OUTPATIENT)
Dept: MRI IMAGING | Facility: HOSPITAL | Age: 85
Discharge: HOME | End: 2025-08-26
Admitting: INTERNAL MEDICINE
Payer: MEDICARE

## 2025-08-26 DIAGNOSIS — M79.661 RIGHT CALF PAIN: ICD-10-CM

## 2025-08-26 PROCEDURE — 73718 MRI LOWER EXTREMITY W/O DYE: CPT
